# Patient Record
Sex: MALE | Race: WHITE | URBAN - NONMETROPOLITAN AREA
[De-identification: names, ages, dates, MRNs, and addresses within clinical notes are randomized per-mention and may not be internally consistent; named-entity substitution may affect disease eponyms.]

---

## 2017-10-20 LAB
CHOLESTEROL, TOTAL: 122 MG/DL
CHOLESTEROL/HDL RATIO: NORMAL
HDLC SERPL-MCNC: 64 MG/DL (ref 35–70)
LDL CHOLESTEROL CALCULATED: 51.4 MG/DL (ref 0–160)
PROSTATE SPECIFIC ANTIGEN: 0.05 NG/ML
TRIGL SERPL-MCNC: 33 MG/DL
VLDLC SERPL CALC-MCNC: NORMAL MG/DL

## 2018-01-30 ENCOUNTER — OFFICE VISIT (OUTPATIENT)
Dept: FAMILY MEDICINE CLINIC | Age: 73
End: 2018-01-30

## 2018-01-30 VITALS — BODY MASS INDEX: 36.08 KG/M2 | HEART RATE: 58 BPM | OXYGEN SATURATION: 98 % | WEIGHT: 243.6 LBS | HEIGHT: 69 IN

## 2018-01-30 DIAGNOSIS — Z23 NEED FOR VACCINATION FOR STREP PNEUMONIAE: ICD-10-CM

## 2018-01-30 DIAGNOSIS — Z91.81 AT HIGH RISK FOR FALLS: ICD-10-CM

## 2018-01-30 DIAGNOSIS — M51.36 DDD (DEGENERATIVE DISC DISEASE), LUMBAR: ICD-10-CM

## 2018-01-30 DIAGNOSIS — J44.9 CHRONIC OBSTRUCTIVE PULMONARY DISEASE, UNSPECIFIED COPD TYPE (HCC): ICD-10-CM

## 2018-01-30 DIAGNOSIS — M79.604 CHRONIC PAIN OF BOTH LOWER EXTREMITIES: Primary | ICD-10-CM

## 2018-01-30 DIAGNOSIS — G89.29 CHRONIC PAIN OF BOTH LOWER EXTREMITIES: Primary | ICD-10-CM

## 2018-01-30 DIAGNOSIS — C61 PROSTATE CANCER (HCC): ICD-10-CM

## 2018-01-30 DIAGNOSIS — M79.605 CHRONIC PAIN OF BOTH LOWER EXTREMITIES: Primary | ICD-10-CM

## 2018-01-30 LAB
A/G RATIO: 1.7 (ref 1.1–2.2)
ALBUMIN SERPL-MCNC: 4.4 G/DL (ref 3.4–5)
ALP BLD-CCNC: 97 U/L (ref 40–129)
ALT SERPL-CCNC: 21 U/L (ref 10–40)
ANION GAP SERPL CALCULATED.3IONS-SCNC: 14 MMOL/L (ref 3–16)
AST SERPL-CCNC: 21 U/L (ref 15–37)
BILIRUB SERPL-MCNC: <0.2 MG/DL (ref 0–1)
BUN BLDV-MCNC: 19 MG/DL (ref 7–20)
CALCIUM SERPL-MCNC: 9.4 MG/DL (ref 8.3–10.6)
CHLORIDE BLD-SCNC: 105 MMOL/L (ref 99–110)
CO2: 22 MMOL/L (ref 21–32)
CREAT SERPL-MCNC: 0.8 MG/DL (ref 0.8–1.3)
GFR AFRICAN AMERICAN: >60
GFR NON-AFRICAN AMERICAN: >60
GLOBULIN: 2.6 G/DL
GLUCOSE BLD-MCNC: 102 MG/DL (ref 70–99)
POTASSIUM SERPL-SCNC: 4.6 MMOL/L (ref 3.5–5.1)
SODIUM BLD-SCNC: 141 MMOL/L (ref 136–145)
TOTAL PROTEIN: 7 G/DL (ref 6.4–8.2)

## 2018-01-30 PROCEDURE — 1123F ACP DISCUSS/DSCN MKR DOCD: CPT | Performed by: NURSE PRACTITIONER

## 2018-01-30 PROCEDURE — G8484 FLU IMMUNIZE NO ADMIN: HCPCS | Performed by: NURSE PRACTITIONER

## 2018-01-30 PROCEDURE — G8427 DOCREV CUR MEDS BY ELIG CLIN: HCPCS | Performed by: NURSE PRACTITIONER

## 2018-01-30 PROCEDURE — 3017F COLORECTAL CA SCREEN DOC REV: CPT | Performed by: NURSE PRACTITIONER

## 2018-01-30 PROCEDURE — G8926 SPIRO NO PERF OR DOC: HCPCS | Performed by: NURSE PRACTITIONER

## 2018-01-30 PROCEDURE — G8417 CALC BMI ABV UP PARAM F/U: HCPCS | Performed by: NURSE PRACTITIONER

## 2018-01-30 PROCEDURE — G0009 ADMIN PNEUMOCOCCAL VACCINE: HCPCS | Performed by: NURSE PRACTITIONER

## 2018-01-30 PROCEDURE — 1036F TOBACCO NON-USER: CPT | Performed by: NURSE PRACTITIONER

## 2018-01-30 PROCEDURE — 3023F SPIROM DOC REV: CPT | Performed by: NURSE PRACTITIONER

## 2018-01-30 PROCEDURE — 90670 PCV13 VACCINE IM: CPT | Performed by: NURSE PRACTITIONER

## 2018-01-30 PROCEDURE — 36415 COLL VENOUS BLD VENIPUNCTURE: CPT | Performed by: NURSE PRACTITIONER

## 2018-01-30 PROCEDURE — 99202 OFFICE O/P NEW SF 15 MIN: CPT | Performed by: NURSE PRACTITIONER

## 2018-01-30 PROCEDURE — 4040F PNEUMOC VAC/ADMIN/RCVD: CPT | Performed by: NURSE PRACTITIONER

## 2018-01-30 RX ORDER — MELATONIN 10 MG
10 CAPSULE ORAL NIGHTLY
COMMUNITY

## 2018-01-30 RX ORDER — FLUTICASONE FUROATE AND VILANTEROL 200; 25 UG/1; UG/1
1 POWDER RESPIRATORY (INHALATION) DAILY
COMMUNITY
End: 2018-12-26 | Stop reason: SDUPTHER

## 2018-01-30 ASSESSMENT — ENCOUNTER SYMPTOMS
EYES NEGATIVE: 1
COUGH: 1
SHORTNESS OF BREATH: 1
GASTROINTESTINAL NEGATIVE: 1
BACK PAIN: 1

## 2018-01-30 ASSESSMENT — PATIENT HEALTH QUESTIONNAIRE - PHQ9
2. FEELING DOWN, DEPRESSED OR HOPELESS: 0
1. LITTLE INTEREST OR PLEASURE IN DOING THINGS: 0
SUM OF ALL RESPONSES TO PHQ QUESTIONS 1-9: 0
SUM OF ALL RESPONSES TO PHQ9 QUESTIONS 1 & 2: 0

## 2018-01-30 NOTE — PROGRESS NOTES
- US Duplex Venous Left Upper Lower Extremity; Future  - Ultrasound doppler arterial legs bilateral; Future  - Comprehensive Metabolic Panel    2. Chronic obstructive pulmonary disease, unspecified COPD type ([address]  Patient reports symptoms currently well controlled with current treatment. Patient reports he takes medication as ordered and paces activities. 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     3. DDD (degenerative disc disease), lumbar  Patient reports chronic back pain with known DDD. 4. Prostate cancer [facility])  Patient had prostatectomy and follows with oncology annually. Reviewed recent PSA which was wnl.    5. Need for vaccination for Strep pneumoniae  Discussed vaccine recommendations including prevnar 13 and Pneumococcal 23 after age 72 years. MA contacted previously provided and patient has had Pneumonia vaccine 23 approximately 12 year ago. Patient agreeable to have prevnar today. Advise on possible side effects/adverse reactions. Also see patient instructions. Recommend Pneumococcal 23 in 1 year. Patient verbalized understanding and agreeable to plan. - Pneumococcal conjugate vaccine 13-valent IM (PREVNAR 13)    6. At high risk for falls    On the basis of positive falls risk screening, assessment and plan is as follows: home safety tips provided.

## 2018-01-30 NOTE — PATIENT INSTRUCTIONS
Patient Education        Chronic Obstructive Pulmonary Disease (COPD): Care Instructions  Your Care Instructions    Chronic obstructive pulmonary disease (COPD) is a general term for a group of lung diseases, including emphysema and chronic bronchitis. People with COPD have decreased airflow in and out of the lungs, which makes it hard to breathe. The airways also can get clogged with thick mucus. Cigarette smoking is a major cause of COPD. Although there is no cure for COPD, you can slow its progress. Following your treatment plan and taking care of yourself can help you feel better and live longer. Follow-up care is a key part of your treatment and safety. Be sure to make and go to all appointments, and call your doctor if you are having problems. It's also a good idea to know your test results and keep a list of the medicines you take. How can you care for yourself at home? ?Staying healthy  ? · Do not smoke. This is the most important step you can take to prevent more damage to your lungs. If you need help quitting, talk to your doctor about stop-smoking programs and medicines. These can increase your chances of quitting for good. ? · Avoid colds and flu. Get a pneumococcal vaccine shot. If you have had one before, ask your doctor whether you need a second dose. Get the flu vaccine every fall. If you must be around people with colds or the flu, wash your hands often. ? · Avoid secondhand smoke, air pollution, and high altitudes. Also avoid cold, dry air and hot, humid air. Stay at home with your windows closed when air pollution is bad. ?Medicines and oxygen therapy  ? · Take your medicines exactly as prescribed. Call your doctor if you think you are having a problem with your medicine. ? · You may be taking medicines such as:  ¨ Bronchodilators. These help open your airways and make breathing easier.  Bronchodilators are either short-acting (work for 6 to 9 hours) or long-acting (work for 25 hours). You inhale most bronchodilators, so they start to act quickly. Always carry your quick-relief inhaler with you in case you need it while you are away from home. ¨ Corticosteroids (prednisone, budesonide). These reduce airway inflammation. They come in pill or inhaled form. You must take these medicines every day for them to work well. ? · A spacer may help you get more inhaled medicine to your lungs. Ask your doctor or pharmacist if a spacer is right for you. If it is, ask how to use it properly. ? · Do not take any vitamins, over-the-counter medicine, or herbal products without talking to your doctor first.   ? · If your doctor prescribed antibiotics, take them as directed. Do not stop taking them just because you feel better. You need to take the full course of antibiotics. ? · Oxygen therapy boosts the amount of oxygen in your blood and helps you breathe easier. Use the flow rate your doctor has recommended, and do not change it without talking to your doctor first.   Activity  ? · Get regular exercise. Walking is an easy way to get exercise. Start out slowly, and walk a little more each day. ? · Pay attention to your breathing. You are exercising too hard if you cannot talk while you are exercising. ? · Take short rest breaks when doing household chores and other activities. ? · Learn breathing methods-such as breathing through pursed lips-to help you become less short of breath. ? · If your doctor has not set you up with a pulmonary rehabilitation program, talk to him or her about whether rehab is right for you. Rehab includes exercise programs, education about your disease and how to manage it, help with diet and other changes, and emotional support. Diet  ? · Eat regular, healthy meals. Use bronchodilators about 1 hour before you eat to make it easier to eat. Eat several small meals instead of three large ones. Drink beverages at the end of the meal. Avoid foods that are hard to chew. bacteria that can spread from person to person through close contact. It can cause ear infections, and it can also lead to more serious infections of the:  · Lungs (pneumonia). · Blood (bacteremia). · Covering of the brain and spinal cord (meningitis). Pneumococcal pneumonia is most common among adults. Pneumococcal meningitis can cause deafness and brain damage, and it kills about 1 child in 10 who get it. Anyone can get pneumococcal disease, but children under 3years of age and adults 72 years and older, people with certain medical conditions, and cigarette smokers are at the highest risk. Before there was a vaccine, the [facility] saw the following in children under 5 each year from pneumococcal disease:  · More than 700 cases of meningitis  · About 13,000 blood infections  · About 5 million ear infections  · About 200 deaths  Since the vaccine became available, severe pneumococcal disease in these children has fallen by 88%. About 18,000 older adults die of pneumococcal disease each year in the [place]. Treatment of pneumococcal infections with penicillin and other drugs is not as effective as it used to be, because some strains of the disease have become resistant to these drugs. This makes prevention of the disease through vaccination even more important. PCV13 vaccine  Pneumococcal conjugate vaccine (called PCV13) protects against 13 types of pneumococcal bacteria. PCV13 is routinely given to children at 2, 4, 6, and 1515 months of age. It is also recommended for children and adults 3to 59years of age with certain health conditions, and for all adults 72years of age and older. Your doctor can give you details. Some people should not get this vaccine  Anyone who has ever had a life-threatening allergic reaction to a dose of this vaccine, to an earlier pneumococcal vaccine called PCV7, or to any vaccine containing diphtheria toxoid (for example, DTaP), should not get PCV13.   Anyone with each year or any time you notice a change. If you have trouble seeing and hearing, you might not be able to avoid objects and could lose your balance. · Know the side effects of the medicines you take. Ask your doctor or pharmacist whether the medicines you take can affect your balance. Sleeping pills or sedatives can affect your balance. · Limit the amount of alcohol you drink. Alcohol can impair your balance and other senses. · Ask your doctor whether calluses or corns on your feet need to be removed. If you wear loose-fitting shoes because of calluses or corns, you can lose your balance and fall. · Talk to your doctor if you have numbness in your feet. Preventing falls at home  · Remove raised doorway thresholds, throw rugs, and clutter. Repair loose carpet or raised areas in the floor. · Move furniture and electrical cords to keep them out of walking paths. · Use nonskid floor wax, and wipe up spills right away, especially on ceramic tile floors. · If you use a walker or cane, put rubber tips on it. If you use crutches, clean the bottoms of them regularly with an abrasive pad, such as steel wool. · Keep your house well lit, especially Kennis Lacy, and outside walkways. Use night-lights in areas such as hallways and bathrooms. Add extra light switches or use remote switches (such as switches that go on or off when you clap your hands) to make it easier to turn lights on if you have to get up during the night. · Install sturdy handrails on stairways. · Move items in your cabinets so that the things you use a lot are on the lower shelves (about waist level). · Keep a cordless phone and a flashlight with new batteries by your bed. If possible, put a phone in each of the main rooms of your house, or carry a cell phone in case you fall and cannot reach a phone. Or, you can wear a device around your neck or wrist. You push a button that sends a signal for help.   · Wear low-heeled shoes that fit well

## 2018-01-31 ENCOUNTER — TELEPHONE (OUTPATIENT)
Dept: FAMILY MEDICINE CLINIC | Age: 73
End: 2018-01-31

## 2018-01-31 DIAGNOSIS — M79.661 PAIN IN BOTH LOWER LEGS: ICD-10-CM

## 2018-01-31 DIAGNOSIS — M79.662 PAIN IN BOTH LOWER LEGS: ICD-10-CM

## 2018-01-31 DIAGNOSIS — I73.9 CLAUDICATION (HCC): Primary | ICD-10-CM

## 2018-02-01 RX ORDER — OMEPRAZOLE 20 MG/1
40 CAPSULE, DELAYED RELEASE ORAL DAILY
Qty: 90 CAPSULE | Refills: 2 | Status: SHIPPED | OUTPATIENT
Start: 2018-02-01 | End: 2018-11-15 | Stop reason: SDUPTHER

## 2018-02-01 RX ORDER — ATORVASTATIN CALCIUM 20 MG/1
20 TABLET, FILM COATED ORAL DAILY
Qty: 90 TABLET | Refills: 2 | Status: SHIPPED | OUTPATIENT
Start: 2018-02-01 | End: 2019-01-14 | Stop reason: SDUPTHER

## 2018-02-06 DIAGNOSIS — Z12.5 PROSTATE CANCER SCREENING: ICD-10-CM

## 2018-02-06 DIAGNOSIS — Z13.220 LIPID SCREENING: Primary | ICD-10-CM

## 2018-02-06 DIAGNOSIS — C61 PROSTATE CANCER (HCC): ICD-10-CM

## 2018-04-16 ENCOUNTER — OFFICE VISIT (OUTPATIENT)
Dept: FAMILY MEDICINE CLINIC | Age: 73
End: 2018-04-16

## 2018-04-16 VITALS
WEIGHT: 252 LBS | DIASTOLIC BLOOD PRESSURE: 74 MMHG | HEIGHT: 69 IN | HEART RATE: 56 BPM | OXYGEN SATURATION: 98 % | BODY MASS INDEX: 37.33 KG/M2 | SYSTOLIC BLOOD PRESSURE: 138 MMHG

## 2018-04-16 DIAGNOSIS — T14.8XXA ABRASION: Primary | ICD-10-CM

## 2018-04-16 DIAGNOSIS — Z23 NEED FOR SHINGLES VACCINE: ICD-10-CM

## 2018-04-16 PROCEDURE — 4040F PNEUMOC VAC/ADMIN/RCVD: CPT | Performed by: NURSE PRACTITIONER

## 2018-04-16 PROCEDURE — 99213 OFFICE O/P EST LOW 20 MIN: CPT | Performed by: NURSE PRACTITIONER

## 2018-04-16 PROCEDURE — G8427 DOCREV CUR MEDS BY ELIG CLIN: HCPCS | Performed by: NURSE PRACTITIONER

## 2018-04-16 PROCEDURE — 1036F TOBACCO NON-USER: CPT | Performed by: NURSE PRACTITIONER

## 2018-04-16 PROCEDURE — 1123F ACP DISCUSS/DSCN MKR DOCD: CPT | Performed by: NURSE PRACTITIONER

## 2018-04-16 PROCEDURE — G8417 CALC BMI ABV UP PARAM F/U: HCPCS | Performed by: NURSE PRACTITIONER

## 2018-04-16 PROCEDURE — 3017F COLORECTAL CA SCREEN DOC REV: CPT | Performed by: NURSE PRACTITIONER

## 2018-04-16 ASSESSMENT — ENCOUNTER SYMPTOMS
RESPIRATORY NEGATIVE: 1
EYES NEGATIVE: 1
ALLERGIC/IMMUNOLOGIC NEGATIVE: 1

## 2018-05-22 ENCOUNTER — OFFICE VISIT (OUTPATIENT)
Dept: FAMILY MEDICINE CLINIC | Age: 73
End: 2018-05-22

## 2018-05-22 VITALS
SYSTOLIC BLOOD PRESSURE: 118 MMHG | HEIGHT: 69 IN | OXYGEN SATURATION: 97 % | BODY MASS INDEX: 36.43 KG/M2 | WEIGHT: 246 LBS | DIASTOLIC BLOOD PRESSURE: 62 MMHG | HEART RATE: 63 BPM

## 2018-05-22 DIAGNOSIS — I25.10 CORONARY ARTERY DISEASE INVOLVING NATIVE CORONARY ARTERY OF NATIVE HEART WITHOUT ANGINA PECTORIS: ICD-10-CM

## 2018-05-22 DIAGNOSIS — E78.2 MIXED HYPERLIPIDEMIA: ICD-10-CM

## 2018-05-22 DIAGNOSIS — I10 ESSENTIAL HYPERTENSION: Primary | ICD-10-CM

## 2018-05-22 DIAGNOSIS — J44.9 CHRONIC OBSTRUCTIVE PULMONARY DISEASE, UNSPECIFIED COPD TYPE (HCC): ICD-10-CM

## 2018-05-22 DIAGNOSIS — Z11.59 ENCOUNTER FOR HEPATITIS C SCREENING TEST FOR LOW RISK PATIENT: ICD-10-CM

## 2018-05-22 DIAGNOSIS — C61 PROSTATE CANCER (HCC): ICD-10-CM

## 2018-05-22 DIAGNOSIS — M51.36 DDD (DEGENERATIVE DISC DISEASE), LUMBAR: ICD-10-CM

## 2018-05-22 DIAGNOSIS — Z13.6 SCREENING FOR AAA (AORTIC ABDOMINAL ANEURYSM): ICD-10-CM

## 2018-05-22 LAB
A/G RATIO: 1.9 (ref 1.1–2.2)
ALBUMIN SERPL-MCNC: 4.4 G/DL (ref 3.4–5)
ALP BLD-CCNC: 105 U/L (ref 40–129)
ALT SERPL-CCNC: 26 U/L (ref 10–40)
ANION GAP SERPL CALCULATED.3IONS-SCNC: 15 MMOL/L (ref 3–16)
AST SERPL-CCNC: 27 U/L (ref 15–37)
BILIRUB SERPL-MCNC: 0.4 MG/DL (ref 0–1)
BUN BLDV-MCNC: 21 MG/DL (ref 7–20)
CALCIUM SERPL-MCNC: 9.1 MG/DL (ref 8.3–10.6)
CHLORIDE BLD-SCNC: 102 MMOL/L (ref 99–110)
CO2: 22 MMOL/L (ref 21–32)
CREAT SERPL-MCNC: 0.7 MG/DL (ref 0.8–1.3)
GFR AFRICAN AMERICAN: >60
GFR NON-AFRICAN AMERICAN: >60
GLOBULIN: 2.3 G/DL
GLUCOSE BLD-MCNC: 99 MG/DL (ref 70–99)
HEPATITIS C ANTIBODY INTERPRETATION: NORMAL
POTASSIUM SERPL-SCNC: 4.6 MMOL/L (ref 3.5–5.1)
SODIUM BLD-SCNC: 139 MMOL/L (ref 136–145)
TOTAL PROTEIN: 6.7 G/DL (ref 6.4–8.2)

## 2018-05-22 PROCEDURE — 3017F COLORECTAL CA SCREEN DOC REV: CPT | Performed by: NURSE PRACTITIONER

## 2018-05-22 PROCEDURE — G8427 DOCREV CUR MEDS BY ELIG CLIN: HCPCS | Performed by: NURSE PRACTITIONER

## 2018-05-22 PROCEDURE — 1123F ACP DISCUSS/DSCN MKR DOCD: CPT | Performed by: NURSE PRACTITIONER

## 2018-05-22 PROCEDURE — 3023F SPIROM DOC REV: CPT | Performed by: NURSE PRACTITIONER

## 2018-05-22 PROCEDURE — G8598 ASA/ANTIPLAT THER USED: HCPCS | Performed by: NURSE PRACTITIONER

## 2018-05-22 PROCEDURE — G8417 CALC BMI ABV UP PARAM F/U: HCPCS | Performed by: NURSE PRACTITIONER

## 2018-05-22 PROCEDURE — G8926 SPIRO NO PERF OR DOC: HCPCS | Performed by: NURSE PRACTITIONER

## 2018-05-22 PROCEDURE — 1036F TOBACCO NON-USER: CPT | Performed by: NURSE PRACTITIONER

## 2018-05-22 PROCEDURE — 4040F PNEUMOC VAC/ADMIN/RCVD: CPT | Performed by: NURSE PRACTITIONER

## 2018-05-22 PROCEDURE — 36415 COLL VENOUS BLD VENIPUNCTURE: CPT | Performed by: NURSE PRACTITIONER

## 2018-05-22 PROCEDURE — 99213 OFFICE O/P EST LOW 20 MIN: CPT | Performed by: NURSE PRACTITIONER

## 2018-05-22 ASSESSMENT — ENCOUNTER SYMPTOMS
GASTROINTESTINAL NEGATIVE: 1
RESPIRATORY NEGATIVE: 1
EYES NEGATIVE: 1

## 2018-11-07 ENCOUNTER — TELEPHONE (OUTPATIENT)
Dept: FAMILY MEDICINE CLINIC | Age: 73
End: 2018-11-07

## 2018-11-07 DIAGNOSIS — E78.00 HYPERCHOLESTEROLEMIA: ICD-10-CM

## 2018-11-07 DIAGNOSIS — J44.9 CHRONIC OBSTRUCTIVE PULMONARY DISEASE, UNSPECIFIED COPD TYPE (HCC): ICD-10-CM

## 2018-11-07 RX ORDER — UBIDECARENONE 75 MG
200 CAPSULE ORAL DAILY
COMMUNITY

## 2018-11-07 RX ORDER — ECHINACEA 400 MG
1000 CAPSULE ORAL DAILY
COMMUNITY
End: 2021-02-22 | Stop reason: ALTCHOICE

## 2018-11-07 NOTE — TELEPHONE ENCOUNTER
Dr. Itzel Rosas 11/21:    Notes: due for lipid & CBC. Ask that I either mail the lab orders to him (time permitted) or fax them to [facility]. He'll get them drawn w/ his specialists labs on 11/14 or 11/15. This patient has an upcoming appointment with you for Chronic Obstructive Pulmonary Disease (COPD(, Hyperlipidemia and Hypertension. In planning for that visit I have completed the following pre-visit planning:     Pre-Visit Planning Checklist:  Patient contacted: yes  Verified patient by name and date of birth: yes    Health Maintenance items reviewed:    No pre-visit planning health maintenance topics to review at this time    Labs and procedures pended: Fasting Lipid Panel  and CBC   Labs and procedures discussed with patient: yes  Reminded patient to check with their insurance company about coverage for lab tests and lab location: yes    Preliminary Medication Reconciliation: was performed. Reminded patient to arrive early: yes    Please complete the med-reconciliation and sign the appropriate labs as soon as possible.       Cassandra Vicente MA  Patient Services Specialist  [phone]

## 2018-11-15 RX ORDER — OMEPRAZOLE 20 MG/1
40 CAPSULE, DELAYED RELEASE ORAL DAILY
Qty: 180 CAPSULE | Refills: 4 | Status: SHIPPED | OUTPATIENT
Start: 2018-11-15 | End: 2019-11-07

## 2018-11-19 LAB
A/G RATIO: 1.1 (ref 1–2.5)
ALBUMIN SERPL-MCNC: 3.7 G/DL (ref 3.6–5)
ALP BLD-CCNC: 106 U/L (ref 46–116)
ALT SERPL-CCNC: 34 U/L (ref 12–78)
ANION GAP SERPL CALCULATED.3IONS-SCNC: 12.7 MMOL/L (ref 8–16)
AST SERPL-CCNC: 25 U/L (ref 12–36)
BILIRUB SERPL-MCNC: 0.4 MG/DL (ref 0–1.1)
BUN BLDV-MCNC: 19 MG/DL (ref 5–19)
CALCIUM SERPL-MCNC: 8.7 MG/DL (ref 8.4–10.2)
CHLORIDE BLD-SCNC: 107 MMOL/L (ref 99–111)
CHOLESTEROL, TOTAL: 122 MG/DL (ref 0–200)
CO2: 28 MMOL/L (ref 21–33)
CREAT SERPL-MCNC: 1.1 MG/DL (ref 0.6–1.3)
ERYTHROCYTE DISTRIBUTION WIDTH RBC RATIO: 12.3 % (ref 11.6–14.8)
GFR CALCULATED: 66
GLOBULIN: 3.4 G/DL (ref 2–3.5)
GLUCOSE BLD-MCNC: 107 MG/DL (ref 74–99)
HCT VFR BLD CALC: 37.4 % (ref 37.4–53.8)
HDLC SERPL-MCNC: 54 MG/DL (ref 18–88)
HEMOGLOBIN: 12.9 G/DL (ref 13.2–16.5)
LDL CHOLESTEROL DIRECT: 61.8 MG/DL
LDL/HDL RATIO: 1.1
MCH RBC QN AUTO: 33 PG (ref 27.7–33.3)
MCHC RBC AUTO-ENTMCNC: 34.5 G/DL (ref 32.8–35)
MCV RBC AUTO: 95.7 FL (ref 80.5–96.9)
PLATELETS: 184 X1000 (ref 129–332)
POTASSIUM SERPL-SCNC: 4.4 MMOL/L (ref 3.4–5)
PROSTATE SPECIFIC ANTIGEN: <0.05 NG/ML (ref 0–4)
RBC # BLD: 3.91 X1000000 (ref 4.16–5.62)
SODIUM BLD-SCNC: 143 MMOL/L (ref 136–146)
TOTAL PROTEIN: 7.1 G/DL (ref 6.3–8)
TRIGL SERPL-MCNC: 31 MG/DL (ref 33–163)
VLDLC SERPL CALC-MCNC: 6.2 MG/DL (ref 10–50)
WBC # BLD: 5.1 X1000 (ref 5.4–9.9)

## 2018-11-20 ENCOUNTER — OFFICE VISIT (OUTPATIENT)
Dept: FAMILY MEDICINE CLINIC | Age: 73
End: 2018-11-20
Payer: MEDICARE

## 2018-11-20 VITALS
WEIGHT: 227.6 LBS | DIASTOLIC BLOOD PRESSURE: 76 MMHG | HEIGHT: 69 IN | SYSTOLIC BLOOD PRESSURE: 122 MMHG | OXYGEN SATURATION: 97 % | HEART RATE: 64 BPM | TEMPERATURE: 98.7 F | BODY MASS INDEX: 33.71 KG/M2

## 2018-11-20 DIAGNOSIS — E78.2 MIXED HYPERLIPIDEMIA: ICD-10-CM

## 2018-11-20 DIAGNOSIS — I10 ESSENTIAL HYPERTENSION: ICD-10-CM

## 2018-11-20 DIAGNOSIS — D64.9 ANEMIA, UNSPECIFIED TYPE: ICD-10-CM

## 2018-11-20 DIAGNOSIS — J44.9 CHRONIC OBSTRUCTIVE PULMONARY DISEASE, UNSPECIFIED COPD TYPE (HCC): ICD-10-CM

## 2018-11-20 DIAGNOSIS — Z85.46 HISTORY OF PROSTATE CANCER: ICD-10-CM

## 2018-11-20 DIAGNOSIS — J44.1 COPD EXACERBATION (HCC): Primary | ICD-10-CM

## 2018-11-20 PROCEDURE — G8484 FLU IMMUNIZE NO ADMIN: HCPCS | Performed by: FAMILY MEDICINE

## 2018-11-20 PROCEDURE — G8926 SPIRO NO PERF OR DOC: HCPCS | Performed by: FAMILY MEDICINE

## 2018-11-20 PROCEDURE — 4040F PNEUMOC VAC/ADMIN/RCVD: CPT | Performed by: FAMILY MEDICINE

## 2018-11-20 PROCEDURE — 3017F COLORECTAL CA SCREEN DOC REV: CPT | Performed by: FAMILY MEDICINE

## 2018-11-20 PROCEDURE — 1101F PT FALLS ASSESS-DOCD LE1/YR: CPT | Performed by: FAMILY MEDICINE

## 2018-11-20 PROCEDURE — 1036F TOBACCO NON-USER: CPT | Performed by: FAMILY MEDICINE

## 2018-11-20 PROCEDURE — 99214 OFFICE O/P EST MOD 30 MIN: CPT | Performed by: FAMILY MEDICINE

## 2018-11-20 PROCEDURE — G8598 ASA/ANTIPLAT THER USED: HCPCS | Performed by: FAMILY MEDICINE

## 2018-11-20 PROCEDURE — G8427 DOCREV CUR MEDS BY ELIG CLIN: HCPCS | Performed by: FAMILY MEDICINE

## 2018-11-20 PROCEDURE — 1123F ACP DISCUSS/DSCN MKR DOCD: CPT | Performed by: FAMILY MEDICINE

## 2018-11-20 PROCEDURE — G8417 CALC BMI ABV UP PARAM F/U: HCPCS | Performed by: FAMILY MEDICINE

## 2018-11-20 PROCEDURE — 3023F SPIROM DOC REV: CPT | Performed by: FAMILY MEDICINE

## 2018-11-20 RX ORDER — ALBUTEROL SULFATE 90 UG/1
2 AEROSOL, METERED RESPIRATORY (INHALATION) EVERY 6 HOURS PRN
Qty: 1 INHALER | Refills: 3 | Status: SHIPPED | OUTPATIENT
Start: 2018-11-20 | End: 2021-08-18 | Stop reason: SDUPTHER

## 2018-11-20 RX ORDER — PREDNISONE 20 MG/1
40 TABLET ORAL DAILY
Qty: 10 TABLET | Refills: 0 | Status: SHIPPED | OUTPATIENT
Start: 2018-11-20 | End: 2018-11-25

## 2018-11-20 RX ORDER — DOXYCYCLINE HYCLATE 100 MG
100 TABLET ORAL 2 TIMES DAILY
Qty: 20 TABLET | Refills: 0 | Status: SHIPPED | OUTPATIENT
Start: 2018-11-20 | End: 2018-11-30

## 2018-11-20 ASSESSMENT — ENCOUNTER SYMPTOMS
COUGH: 1
SORE THROAT: 1
DIARRHEA: 0
SHORTNESS OF BREATH: 1
CHEST TIGHTNESS: 0
NAUSEA: 0
BLOOD IN STOOL: 0
CONSTIPATION: 0

## 2018-11-20 NOTE — PROGRESS NOTES
Physical Exam   Constitutional: He is oriented to person, place, and time. Vital signs are normal. He appears well-developed and well-nourished. He is cooperative. He does not have a sickly appearance. HENT:   Head: Normocephalic and atraumatic. Right Ear: Decreased hearing is noted. Left Ear: Decreased hearing is noted. Mouth/Throat: Uvula is midline and mucous membranes are normal.   bilat hearing aids   Eyes: Conjunctivae and EOM are normal.   Neck: Neck supple. Cardiovascular: Normal rate, regular rhythm, S1 normal, S2 normal, normal heart sounds and normal pulses. No murmur heard. Pulmonary/Chest: Effort normal. No accessory muscle usage. No respiratory distress. He has decreased breath sounds. Neurological: He is alert and oriented to person, place, and time. Skin: Skin is warm and dry. No rash noted. Psychiatric: He has a normal mood and affect. His speech is normal and behavior is normal.         ASSESSMENT/PLAN:    1. COPD exacerbation (HCC)  Likely mild COPD exacerbation. Given his long history, antibiotics and steroid as below. Patient denies need for cough medication. Add albuterol on an as-needed basis. Otherwise continue medications. I do not see a need for chest x-ray today, given normal oxygen saturations. - albuterol sulfate HFA (PROVENTIL HFA) 108 (90 Base) MCG/ACT inhaler; Inhale 2 puffs into the lungs every 6 hours as needed for Wheezing  Dispense: 1 Inhaler; Refill: 3  - doxycycline hyclate (VIBRA-TABS) 100 MG tablet; Take 1 tablet by mouth 2 times daily for 10 days  Dispense: 20 tablet; Refill: 0  - predniSONE (DELTASONE) 20 MG tablet; Take 2 tablets by mouth daily for 5 days  Dispense: 10 tablet; Refill: 0    2. Chronic obstructive pulmonary disease, unspecified COPD type ([facility] [address]  See above. Continue Breo. 3. Essential hypertension  The current medical regimen is effective;  continue present plan and medications. 4. Mixed hyperlipidemia  Reviewed labs. The

## 2018-11-26 ENCOUNTER — TELEPHONE (OUTPATIENT)
Dept: FAMILY MEDICINE CLINIC | Age: 73
End: 2018-11-26

## 2018-11-26 DIAGNOSIS — R05.9 COUGH: Primary | ICD-10-CM

## 2018-11-26 RX ORDER — CEFDINIR 300 MG/1
300 CAPSULE ORAL 2 TIMES DAILY
Qty: 20 CAPSULE | Refills: 0 | Status: SHIPPED | OUTPATIENT
Start: 2018-11-26 | End: 2019-12-13 | Stop reason: SDUPTHER

## 2018-11-26 RX ORDER — BENZONATATE 200 MG/1
200 CAPSULE ORAL EVERY 8 HOURS PRN
Qty: 30 CAPSULE | Refills: 0 | Status: SHIPPED | OUTPATIENT
Start: 2018-11-26 | End: 2018-12-06

## 2018-11-26 RX ORDER — AZITHROMYCIN 250 MG/1
250 TABLET, FILM COATED ORAL DAILY
Qty: 10 TABLET | Refills: 0 | Status: SHIPPED | OUTPATIENT
Start: 2018-11-26 | End: 2018-12-06

## 2018-11-26 NOTE — PROGRESS NOTES
No evidence of pneumonia on cxr. Has been on doxy. Stop doxy. omnicef 300mg po bid x 10 days, #20, no rf and zpack. rec otc florastor or culturelle to help dec risk of SE related to abx.   If worsening, rec either f/u appt or ER if sig worse

## 2018-12-13 ENCOUNTER — NURSE ONLY (OUTPATIENT)
Dept: FAMILY MEDICINE CLINIC | Age: 73
End: 2018-12-13
Payer: MEDICARE

## 2018-12-13 ENCOUNTER — TELEPHONE (OUTPATIENT)
Dept: FAMILY MEDICINE CLINIC | Age: 73
End: 2018-12-13

## 2018-12-13 DIAGNOSIS — Z23 NEED FOR IMMUNIZATION AGAINST INFLUENZA: Primary | ICD-10-CM

## 2018-12-13 DIAGNOSIS — D64.9 ANEMIA, UNSPECIFIED TYPE: ICD-10-CM

## 2018-12-13 LAB
CONTROL: PRESENT
HEMOCCULT STL QL: NEGATIVE

## 2018-12-13 PROCEDURE — 90662 IIV NO PRSV INCREASED AG IM: CPT | Performed by: FAMILY MEDICINE

## 2018-12-13 PROCEDURE — G0008 ADMIN INFLUENZA VIRUS VAC: HCPCS | Performed by: FAMILY MEDICINE

## 2018-12-13 PROCEDURE — 82274 ASSAY TEST FOR BLOOD FECAL: CPT | Performed by: FAMILY MEDICINE

## 2018-12-13 NOTE — TELEPHONE ENCOUNTER
Pt is requesting an excuse for [employer] due to his Paget's disease, is this okay to give?

## 2019-01-02 RX ORDER — FLUTICASONE FUROATE AND VILANTEROL 200; 25 UG/1; UG/1
1 POWDER RESPIRATORY (INHALATION) DAILY
Qty: 3 EACH | Refills: 3 | Status: SHIPPED | OUTPATIENT
Start: 2019-01-02 | End: 2019-12-30 | Stop reason: SDUPTHER

## 2019-01-14 RX ORDER — ATORVASTATIN CALCIUM 20 MG/1
20 TABLET, FILM COATED ORAL DAILY
Qty: 90 TABLET | Refills: 3 | Status: SHIPPED | OUTPATIENT
Start: 2019-01-14 | End: 2020-01-30 | Stop reason: SDUPTHER

## 2019-03-28 ENCOUNTER — OFFICE VISIT (OUTPATIENT)
Dept: FAMILY MEDICINE CLINIC | Age: 74
End: 2019-03-28
Payer: MEDICARE

## 2019-03-28 VITALS
DIASTOLIC BLOOD PRESSURE: 78 MMHG | SYSTOLIC BLOOD PRESSURE: 110 MMHG | BODY MASS INDEX: 34.07 KG/M2 | WEIGHT: 230 LBS | HEART RATE: 60 BPM | TEMPERATURE: 98.7 F | OXYGEN SATURATION: 98 % | HEIGHT: 69 IN

## 2019-03-28 DIAGNOSIS — J06.9 UPPER RESPIRATORY TRACT INFECTION, UNSPECIFIED TYPE: Primary | ICD-10-CM

## 2019-03-28 DIAGNOSIS — J44.9 CHRONIC OBSTRUCTIVE PULMONARY DISEASE, UNSPECIFIED COPD TYPE (HCC): ICD-10-CM

## 2019-03-28 PROCEDURE — 3017F COLORECTAL CA SCREEN DOC REV: CPT | Performed by: FAMILY MEDICINE

## 2019-03-28 PROCEDURE — 99213 OFFICE O/P EST LOW 20 MIN: CPT | Performed by: FAMILY MEDICINE

## 2019-03-28 PROCEDURE — G8427 DOCREV CUR MEDS BY ELIG CLIN: HCPCS | Performed by: FAMILY MEDICINE

## 2019-03-28 PROCEDURE — 1123F ACP DISCUSS/DSCN MKR DOCD: CPT | Performed by: FAMILY MEDICINE

## 2019-03-28 PROCEDURE — 1036F TOBACCO NON-USER: CPT | Performed by: FAMILY MEDICINE

## 2019-03-28 PROCEDURE — 4040F PNEUMOC VAC/ADMIN/RCVD: CPT | Performed by: FAMILY MEDICINE

## 2019-03-28 PROCEDURE — G8926 SPIRO NO PERF OR DOC: HCPCS | Performed by: FAMILY MEDICINE

## 2019-03-28 PROCEDURE — G8482 FLU IMMUNIZE ORDER/ADMIN: HCPCS | Performed by: FAMILY MEDICINE

## 2019-03-28 PROCEDURE — G8417 CALC BMI ABV UP PARAM F/U: HCPCS | Performed by: FAMILY MEDICINE

## 2019-03-28 PROCEDURE — G8598 ASA/ANTIPLAT THER USED: HCPCS | Performed by: FAMILY MEDICINE

## 2019-03-28 PROCEDURE — 3023F SPIROM DOC REV: CPT | Performed by: FAMILY MEDICINE

## 2019-03-28 RX ORDER — BENZONATATE 200 MG/1
200 CAPSULE ORAL 3 TIMES DAILY PRN
Qty: 30 CAPSULE | Refills: 0 | Status: SHIPPED | OUTPATIENT
Start: 2019-03-28 | End: 2019-04-07

## 2019-03-28 RX ORDER — PREDNISONE 20 MG/1
40 TABLET ORAL DAILY
Qty: 10 TABLET | Refills: 0 | Status: SHIPPED | OUTPATIENT
Start: 2019-03-28 | End: 2019-04-02

## 2019-03-28 RX ORDER — FLUTICASONE PROPIONATE 50 MCG
2 SPRAY, SUSPENSION (ML) NASAL DAILY
Qty: 1 BOTTLE | Refills: 3 | Status: SHIPPED | OUTPATIENT
Start: 2019-03-28 | End: 2021-02-22

## 2019-03-28 ASSESSMENT — ENCOUNTER SYMPTOMS
VOMITING: 0
SHORTNESS OF BREATH: 0
RHINORRHEA: 1
SINUS PRESSURE: 0
SORE THROAT: 0
WHEEZING: 0
COUGH: 1
DIARRHEA: 0
NAUSEA: 0

## 2019-04-04 ENCOUNTER — OFFICE VISIT (OUTPATIENT)
Dept: FAMILY MEDICINE CLINIC | Age: 74
End: 2019-04-04
Payer: MEDICARE

## 2019-04-04 VITALS
SYSTOLIC BLOOD PRESSURE: 130 MMHG | HEART RATE: 57 BPM | BODY MASS INDEX: 34.24 KG/M2 | WEIGHT: 231.2 LBS | OXYGEN SATURATION: 98 % | DIASTOLIC BLOOD PRESSURE: 82 MMHG | HEIGHT: 69 IN

## 2019-04-04 DIAGNOSIS — Z01.818 PREOP EXAMINATION: Primary | ICD-10-CM

## 2019-04-04 DIAGNOSIS — J44.9 CHRONIC OBSTRUCTIVE PULMONARY DISEASE, UNSPECIFIED COPD TYPE (HCC): ICD-10-CM

## 2019-04-04 DIAGNOSIS — I25.10 CORONARY ARTERY DISEASE INVOLVING NATIVE CORONARY ARTERY OF NATIVE HEART WITHOUT ANGINA PECTORIS: ICD-10-CM

## 2019-04-04 DIAGNOSIS — H26.9 CATARACT OF BOTH EYES, UNSPECIFIED CATARACT TYPE: ICD-10-CM

## 2019-04-04 PROCEDURE — G8428 CUR MEDS NOT DOCUMENT: HCPCS | Performed by: FAMILY MEDICINE

## 2019-04-04 PROCEDURE — G8598 ASA/ANTIPLAT THER USED: HCPCS | Performed by: FAMILY MEDICINE

## 2019-04-04 PROCEDURE — 3023F SPIROM DOC REV: CPT | Performed by: FAMILY MEDICINE

## 2019-04-04 PROCEDURE — 3017F COLORECTAL CA SCREEN DOC REV: CPT | Performed by: FAMILY MEDICINE

## 2019-04-04 PROCEDURE — 4040F PNEUMOC VAC/ADMIN/RCVD: CPT | Performed by: FAMILY MEDICINE

## 2019-04-04 PROCEDURE — 1123F ACP DISCUSS/DSCN MKR DOCD: CPT | Performed by: FAMILY MEDICINE

## 2019-04-04 PROCEDURE — G8417 CALC BMI ABV UP PARAM F/U: HCPCS | Performed by: FAMILY MEDICINE

## 2019-04-04 PROCEDURE — G8926 SPIRO NO PERF OR DOC: HCPCS | Performed by: FAMILY MEDICINE

## 2019-04-04 PROCEDURE — 1036F TOBACCO NON-USER: CPT | Performed by: FAMILY MEDICINE

## 2019-04-04 PROCEDURE — 99213 OFFICE O/P EST LOW 20 MIN: CPT | Performed by: FAMILY MEDICINE

## 2019-04-04 ASSESSMENT — ENCOUNTER SYMPTOMS
CHEST TIGHTNESS: 0
DIARRHEA: 0
CONSTIPATION: 0
SHORTNESS OF BREATH: 0
BLOOD IN STOOL: 0

## 2019-04-04 ASSESSMENT — PATIENT HEALTH QUESTIONNAIRE - PHQ9
SUM OF ALL RESPONSES TO PHQ9 QUESTIONS 1 & 2: 0
1. LITTLE INTEREST OR PLEASURE IN DOING THINGS: 0
2. FEELING DOWN, DEPRESSED OR HOPELESS: 0
SUM OF ALL RESPONSES TO PHQ QUESTIONS 1-9: 0
SUM OF ALL RESPONSES TO PHQ QUESTIONS 1-9: 0

## 2019-04-04 NOTE — PROGRESS NOTES
Khushboo Nunez  YOB: 1945    Date of Service:  4/4/2019      Wt Readings from Last 2 Encounters:   04/04/19 231 lb 3.2 oz (104.9 kg)   03/28/19 230 lb (104.3 kg)       BP Readings from Last 3 Encounters:   04/04/19 130/82   03/28/19 110/78   11/20/18 122/76        Chief Complaint   Patient presents with   [address]/4-9-2019/Cataract Removal L eye       Allergies   Allergen Reactions    Pcn [Penicillins] Swelling    Wheat Extract Other (See Comments)     Celiac disease       No outpatient medications have been marked as taking for the 4/4/19 encounter (Office Visit) with Ryann Wiley MD.       This patient presents to the office today for a preoperative consultation at the request of surgeon, Dr. Camilo Foley, who plans on performing Left Cataract Removal on April 9 . The current problem began 4 years ago, and symptoms have been worsening with time. Conservative therapy: No. Plans on right cataract surgery approx 4/30.     Planned anesthesia: Local and IV sedation   Known anesthesia problems: None   Bleeding risk: No recent or remote history of abnormal bleeding  Personal or FH of DVT/PE: Yes - 1980's    Patient objection to receiving blood products: No    Past Medical History:   Diagnosis Date    Asthma     CAD (coronary artery disease)     Celiac sprue     COPD (chronic obstructive pulmonary disease) ([address]     DDD (degenerative disc disease), lumbar     Fatty liver     HTN (hypertension)     Hyperlipidemia     Paget's disease     Prostate cancer ([address] 07/2009       Past Surgical History:   Procedure Laterality Date    COLONOSCOPY  11/2017    PROSTATECTOMY      UPPER GASTROINTESTINAL ENDOSCOPY  11/19/2012       Family History   Problem Relation Age of Onset    Other Mother         macular degeneration, DVT     Colon Cancer Father     Other Brother         celiac    High Blood Pressure Brother     Diabetes Brother     Heart Attack Maternal Grandmother     [FreeTextEntry1] : see consult letter Heart Attack Maternal Grandfather     Stroke Maternal Grandfather     Heart Attack Paternal Grandfather     Other Paternal Uncle         stomach cancer       Social History     Socioeconomic History    Marital status:      Spouse name: Not on file    Number of children: Not on file    Years of education: Not on file    Highest education level: Not on file   Occupational History    Occupation: [employer] Financial resource strain: Not on file    Food insecurity:     Worry: Not on file     Inability: Not on file   [employer] needs:     Medical: Not on file     Non-medical: Not on file   Tobacco Use    Smoking status: Former Smoker     Packs/day: 0.10     Years: 4.00     Pack years: 0.40     Types: Cigars     Start date: 1964     Last attempt to quit: 1970     Years since quittin.2    Smokeless tobacco: Never Used   Substance and Sexual Activity    Alcohol use: No    Drug use: No    Sexual activity: Yes   Lifestyle    Physical activity:     Days per week: Not on file     Minutes per session: Not on file    Stress: Not on file   Relationships    Social connections:     Talks on phone: Not on file     Gets together: Not on file     Attends Gnosticist service: Not on file     Active member of club or organization: Not on file     Attends meetings of clubs or organizations: Not on file     Relationship status: Not on file    Intimate partner violence:     Fear of current or ex partner: Not on file     Emotionally abused: Not on file     Physically abused: Not on file     Forced sexual activity: Not on file   Other Topics Concern    Not on file   Social History Narrative    Not on file       Review of Systems  Review of Systems   Constitutional: Negative for chills, fatigue and fever. Respiratory: Negative for chest tightness and shortness of breath. Cardiovascular: Negative for chest pain and palpitations.    Gastrointestinal: Negative for blood in stool, surgery: Discontinue ASA 7 days before surgery  3. Prophylaxis forcardiac events with perioperative beta-blockers: Not indicated  ACC/AHA indications for pre-operative beta-blocker use:    · Vascular surgery with history of postitive stress test  · Intermediate or high risk surgery with history of CAD   · Intermediate or high risk surgery with multiple clinical predictors of CAD- 2 of the following: history of compensated or prior heart failure, history of cerebrovascular disease, DM, or renal insufficiency    Routine administration of higher-dose, long-acting metoprolol in beta-blocker-naïve patients on the day of surgery, and in the absence of dose titration is associated with an overall increase in mortality. Beta-blockers should be started days to weeks prior to surgery and titrated to pulse < 70.  4. Deep vein thrombosis prophylaxis: n/a  5. No contraindications to planned surgery  Had cardiac clearance. Reviewed in [address] [facility].  Andi Santos MD

## 2019-05-15 ENCOUNTER — OFFICE VISIT (OUTPATIENT)
Dept: FAMILY MEDICINE CLINIC | Age: 74
End: 2019-05-15
Payer: MEDICARE

## 2019-05-15 VITALS
OXYGEN SATURATION: 98 % | DIASTOLIC BLOOD PRESSURE: 70 MMHG | BODY MASS INDEX: 35.16 KG/M2 | HEART RATE: 57 BPM | SYSTOLIC BLOOD PRESSURE: 134 MMHG | HEIGHT: 69 IN | WEIGHT: 237.4 LBS

## 2019-05-15 DIAGNOSIS — D64.9 ANEMIA, UNSPECIFIED TYPE: ICD-10-CM

## 2019-05-15 DIAGNOSIS — J44.9 CHRONIC OBSTRUCTIVE PULMONARY DISEASE, UNSPECIFIED COPD TYPE (HCC): ICD-10-CM

## 2019-05-15 DIAGNOSIS — C61 PROSTATE CANCER (HCC): ICD-10-CM

## 2019-05-15 DIAGNOSIS — E78.2 MIXED HYPERLIPIDEMIA: ICD-10-CM

## 2019-05-15 DIAGNOSIS — Z23 NEED FOR PNEUMOCOCCAL VACCINATION: Primary | ICD-10-CM

## 2019-05-15 LAB
BASOPHILS ABSOLUTE: 0.1 K/UL (ref 0–0.2)
BASOPHILS RELATIVE PERCENT: 1.5 %
EOSINOPHILS ABSOLUTE: 0.1 K/UL (ref 0–0.6)
EOSINOPHILS RELATIVE PERCENT: 2.5 %
HCT VFR BLD CALC: 39.7 % (ref 40.5–52.5)
HEMOGLOBIN: 13.8 G/DL (ref 13.5–17.5)
LYMPHOCYTES ABSOLUTE: 1 K/UL (ref 1–5.1)
LYMPHOCYTES RELATIVE PERCENT: 24.1 %
MCH RBC QN AUTO: 34.4 PG (ref 26–34)
MCHC RBC AUTO-ENTMCNC: 34.8 G/DL (ref 31–36)
MCV RBC AUTO: 98.9 FL (ref 80–100)
MONOCYTES ABSOLUTE: 0.4 K/UL (ref 0–1.3)
MONOCYTES RELATIVE PERCENT: 10.2 %
NEUTROPHILS ABSOLUTE: 2.6 K/UL (ref 1.7–7.7)
NEUTROPHILS RELATIVE PERCENT: 61.7 %
PDW BLD-RTO: 13.6 % (ref 12.4–15.4)
PLATELET # BLD: 173 K/UL (ref 135–450)
PMV BLD AUTO: 10 FL (ref 5–10.5)
RBC # BLD: 4.02 M/UL (ref 4.2–5.9)
WBC # BLD: 4.3 K/UL (ref 4–11)

## 2019-05-15 PROCEDURE — 4040F PNEUMOC VAC/ADMIN/RCVD: CPT | Performed by: FAMILY MEDICINE

## 2019-05-15 PROCEDURE — 3017F COLORECTAL CA SCREEN DOC REV: CPT | Performed by: FAMILY MEDICINE

## 2019-05-15 PROCEDURE — G8417 CALC BMI ABV UP PARAM F/U: HCPCS | Performed by: FAMILY MEDICINE

## 2019-05-15 PROCEDURE — G8427 DOCREV CUR MEDS BY ELIG CLIN: HCPCS | Performed by: FAMILY MEDICINE

## 2019-05-15 PROCEDURE — 90732 PPSV23 VACC 2 YRS+ SUBQ/IM: CPT | Performed by: FAMILY MEDICINE

## 2019-05-15 PROCEDURE — G0009 ADMIN PNEUMOCOCCAL VACCINE: HCPCS | Performed by: FAMILY MEDICINE

## 2019-05-15 PROCEDURE — 1123F ACP DISCUSS/DSCN MKR DOCD: CPT | Performed by: FAMILY MEDICINE

## 2019-05-15 PROCEDURE — G8598 ASA/ANTIPLAT THER USED: HCPCS | Performed by: FAMILY MEDICINE

## 2019-05-15 PROCEDURE — 3023F SPIROM DOC REV: CPT | Performed by: FAMILY MEDICINE

## 2019-05-15 PROCEDURE — G8926 SPIRO NO PERF OR DOC: HCPCS | Performed by: FAMILY MEDICINE

## 2019-05-15 PROCEDURE — 36415 COLL VENOUS BLD VENIPUNCTURE: CPT | Performed by: FAMILY MEDICINE

## 2019-05-15 PROCEDURE — 99214 OFFICE O/P EST MOD 30 MIN: CPT | Performed by: FAMILY MEDICINE

## 2019-05-15 PROCEDURE — 1036F TOBACCO NON-USER: CPT | Performed by: FAMILY MEDICINE

## 2019-05-15 ASSESSMENT — ENCOUNTER SYMPTOMS
DIARRHEA: 0
CHEST TIGHTNESS: 0
BLURRED VISION: 0
BLOOD IN STOOL: 0
CONSTIPATION: 0
SHORTNESS OF BREATH: 0

## 2019-05-15 NOTE — PROGRESS NOTES
Chief Complaint   Patient presents with    Hypertension    Hyperlipidemia       HPI:  Howard Westbrook is a 69 y.o. (: 1945) here today   for   Hypertension   This is a chronic problem. The current episode started more than 1 year ago. The problem is unchanged. The problem is controlled. Pertinent negatives include no blurred vision, chest pain, headaches, malaise/fatigue, palpitations or shortness of breath. There are no associated agents to hypertension. Risk factors for coronary artery disease include dyslipidemia and male gender. Past treatments include nothing. There are no compliance problems. Hyperlipidemia   This is a chronic problem. The current episode started more than 1 year ago. The problem is controlled. Recent lipid tests were reviewed and are normal. He has no history of diabetes or hypothyroidism. There are no known factors aggravating his hyperlipidemia. Pertinent negatives include no chest pain, leg pain or shortness of breath. Current antihyperlipidemic treatment includes statins and fibric acid derivatives. The current treatment provides mild improvement of lipids. Risk factors for coronary artery disease include dyslipidemia, hypertension and male sex. Recent Cataract surgery, has f/u in 2 weeks to find out if he will need reading glasses. Copd has been well controlled, rarely using rescue inhaler. Intermittently has flare ups when he over exerts himself. Had prostate cancer follow up in February. Annual follow up. Patient's medications, allergies, past medical, surgical, social and family histories were reviewed and updated as appropriate. ROS:  Review of Systems   Constitutional: Negative for chills, fatigue, fever and malaise/fatigue. Eyes: Negative for blurred vision. Respiratory: Negative for chest tightness and shortness of breath. Cardiovascular: Negative for chest pain and palpitations.    Gastrointestinal: Negative for blood in stool, constipation and diarrhea. Neurological: Negative for dizziness, light-headedness and headaches.            LDL Calculated (mg/dL)   Date Value   10/20/2017 51.4       Past Medical History:   Diagnosis Date    Asthma     CAD (coronary artery disease)     Celiac sprue     COPD (chronic obstructive pulmonary disease) (HCC)     DDD (degenerative disc disease), lumbar     Fatty liver     HTN (hypertension)     Hyperlipidemia     Paget's disease     Prostate cancer ([facility] [address] 2009       Family History   Problem Relation Age of Onset    Other Mother         macular degeneration, DVT     Colon Cancer Father     Other Brother         celiac    High Blood Pressure Brother     Diabetes Brother     Heart Attack Maternal Grandmother     Heart Attack Maternal Grandfather     Stroke Maternal Grandfather     Heart Attack Paternal Grandfather     Other Paternal Uncle         stomach cancer       Social History     Socioeconomic History    Marital status:      Spouse name: Not on file    Number of children: Not on file    Years of education: Not on file    Highest education level: Not on file   Occupational History    Occupation: [email or web address] Financial resource strain: Not on file    Food insecurity:     Worry: Not on file     Inability: Not on file   [employer] needs:     Medical: Not on file     Non-medical: Not on file   Tobacco Use    Smoking status: Former Smoker     Packs/day: 0.10     Years: 4.00     Pack years: 0.40     Types: Cigars     Start date: 1964     Last attempt to quit: 1970     Years since quittin.4    Smokeless tobacco: Never Used   Substance and Sexual Activity    Alcohol use: No    Drug use: No    Sexual activity: Yes   Lifestyle    Physical activity:     Days per week: Not on file     Minutes per session: Not on file    Stress: Not on file   Relationships    Social connections:     Talks on phone: Not on file     Gets together: Not on file     Attends [employer] service: Not on file     Active member of club or organization: Not on file     Attends meetings of clubs or organizations: Not on file     Relationship status: Not on file    Intimate partner violence:     Fear of current or ex partner: Not on file     Emotionally abused: Not on file     Physically abused: Not on file     Forced sexual activity: Not on file   Other Topics Concern    Not on file   Social History Narrative    Not on file       Prior to Visit Medications    Medication Sig Taking? Authorizing Provider   fluticasone (FLONASE) 50 MCG/ACT nasal spray 2 sprays by Nasal route daily Yes Valente Castillo MD   atorvastatin (LIPITOR) 20 MG tablet Take 1 tablet by mouth daily Yes DONTA Lipscomb - CNP   Fluticasone Furoate-Vilanterol (BREO ELLIPTA) 200-25 MCG/INH AEPB Inhale 1 puff into the lungs daily Yes Valente Castillo MD   albuterol sulfate HFA (PROVENTIL HFA) 108 (90 Base) MCG/ACT inhaler Inhale 2 puffs into the lungs every 6 hours as needed for Wheezing Yes Valente Castillo MD   omeprazole (PRILOSEC) 20 MG delayed release capsule Take 2 capsules by mouth Daily Yes Valente Castillo MD   Flaxseed, Linseed, (FLAXSEED OIL) 1000 MG CAPS Take 1,000 mg by mouth daily Yes Historical Provider, MD   Coenzyme Q10 (CO Q-10) 200 MG CAPS Take 200 mg by mouth daily Yes Historical Provider, MD   Diphenhydramine-APAP, sleep, (TYLENOL PM EXTRA STRENGTH PO) Take by mouth as needed Yes Historical Provider, MD   Melatonin 10 MG CAPS Take 10 mg by mouth nightly Yes Historical Provider, MD   Glucosamine-Chondroitin-Ca-D3 (TRIPLE FLEX 50+ PO) Take 2 tablets by mouth Daily. Yes Historical Provider, MD   Calcium-Magnesium-Vitamin D (CALCIUM MAGNESIUM PO) Take 1 tablet by mouth Daily. Dose is 1200/400  Yes Historical Provider, MD   acetaminophen (TYLENOL) 325 MG tablet Take 1,000 mg by mouth as needed. Yes Historical Provider, MD   Casanthranol-Docusate Sodium  MG CAPS Take 1 capsule by mouth as needed.  LOC prn  Yes Historical Provider, MD   therapeutic multivitamin-minerals (THERAGRAN-M) tablet Take 1 tablet by mouth daily. Yes Historical Provider, MD   aspirin 81 MG chewable tablet Take 81 mg by mouth once a week  Yes Historical Provider, MD       Allergies   Allergen Reactions    Pcn [Penicillins] Swelling    Wheat Extract Other (See Comments)     Celiac disease       OBJECTIVE:    /70   Pulse 57   Ht 5' 8.5\" (1.74 m)   Wt 237 lb 6.4 oz (107.7 kg)   SpO2 98%   BMI 35.57 kg/m²     BP Readings from Last 2 Encounters:   05/15/19 134/70   04/04/19 130/82       Wt Readings from Last 3 Encounters:   05/15/19 237 lb 6.4 oz (107.7 kg)   04/04/19 231 lb 3.2 oz (104.9 kg)   03/28/19 230 lb (104.3 kg)       Physical Exam   Constitutional: He is oriented to person, place, and time. He appears well-developed and well-nourished. HENT:   Head: Normocephalic and atraumatic. Eyes: Conjunctivae and EOM are normal.   Neck: No tracheal deviation present. Cardiovascular: Normal rate and regular rhythm. Exam reveals no gallop and no friction rub. No murmur heard. Pulmonary/Chest: Effort normal and breath sounds normal.   Abdominal: Soft. There is no tenderness. Musculoskeletal: He exhibits no edema. Neurological: He is alert and oriented to person, place, and time. Skin: Skin is warm and dry. Psychiatric: He has a normal mood and affect. ASSESSMENT/PLAN:    1. Need for pneumococcal vaccination  Vaccine today  - PNEUMOVAX 23 subcutaneous/IM (Pneumococcal polysaccharide vaccine 23-valent >= 3yo)    2. Essential hypertension  bp controlled w/o meds    3. Mixed hyperlipidemia  The current medical regimen is effective;  continue present plan and medications. Rpt labs around next visit    4. Chronic obstructive pulmonary disease, unspecified COPD type ([address]  The current medical regimen is effective;  continue present plan and medications. 5. Anemia, unspecified type  Mild last check.   Rpt labs  - CBC

## 2019-10-01 ENCOUNTER — OFFICE VISIT (OUTPATIENT)
Dept: FAMILY MEDICINE CLINIC | Age: 74
End: 2019-10-01
Payer: MEDICARE

## 2019-10-01 VITALS
HEART RATE: 64 BPM | BODY MASS INDEX: 32.3 KG/M2 | DIASTOLIC BLOOD PRESSURE: 64 MMHG | WEIGHT: 225.6 LBS | SYSTOLIC BLOOD PRESSURE: 118 MMHG | OXYGEN SATURATION: 98 % | HEIGHT: 70 IN | RESPIRATION RATE: 12 BRPM

## 2019-10-01 DIAGNOSIS — C61 PROSTATE CANCER (HCC): ICD-10-CM

## 2019-10-01 DIAGNOSIS — E78.2 MIXED HYPERLIPIDEMIA: ICD-10-CM

## 2019-10-01 DIAGNOSIS — D64.9 ANEMIA, UNSPECIFIED TYPE: ICD-10-CM

## 2019-10-01 DIAGNOSIS — Z00.00 ROUTINE GENERAL MEDICAL EXAMINATION AT A HEALTH CARE FACILITY: ICD-10-CM

## 2019-10-01 DIAGNOSIS — Z23 NEEDS FLU SHOT: ICD-10-CM

## 2019-10-01 LAB
BASOPHILS ABSOLUTE: 0 K/UL (ref 0–0.2)
BASOPHILS RELATIVE PERCENT: 1.1 %
EOSINOPHILS ABSOLUTE: 0.1 K/UL (ref 0–0.6)
EOSINOPHILS RELATIVE PERCENT: 2.4 %
HCT VFR BLD CALC: 39.3 % (ref 40.5–52.5)
HEMOGLOBIN: 13.6 G/DL (ref 13.5–17.5)
LYMPHOCYTES ABSOLUTE: 1.1 K/UL (ref 1–5.1)
LYMPHOCYTES RELATIVE PERCENT: 31.5 %
MCH RBC QN AUTO: 33.9 PG (ref 26–34)
MCHC RBC AUTO-ENTMCNC: 34.6 G/DL (ref 31–36)
MCV RBC AUTO: 98.2 FL (ref 80–100)
MONOCYTES ABSOLUTE: 0.4 K/UL (ref 0–1.3)
MONOCYTES RELATIVE PERCENT: 10.3 %
NEUTROPHILS ABSOLUTE: 1.9 K/UL (ref 1.7–7.7)
NEUTROPHILS RELATIVE PERCENT: 54.7 %
PDW BLD-RTO: 13 % (ref 12.4–15.4)
PLATELET # BLD: 188 K/UL (ref 135–450)
PMV BLD AUTO: 9.7 FL (ref 5–10.5)
RBC # BLD: 4 M/UL (ref 4.2–5.9)
WBC # BLD: 3.5 K/UL (ref 4–11)

## 2019-10-01 PROCEDURE — 90653 IIV ADJUVANT VACCINE IM: CPT | Performed by: FAMILY MEDICINE

## 2019-10-01 PROCEDURE — 3017F COLORECTAL CA SCREEN DOC REV: CPT | Performed by: FAMILY MEDICINE

## 2019-10-01 PROCEDURE — G8482 FLU IMMUNIZE ORDER/ADMIN: HCPCS | Performed by: FAMILY MEDICINE

## 2019-10-01 PROCEDURE — 4040F PNEUMOC VAC/ADMIN/RCVD: CPT | Performed by: FAMILY MEDICINE

## 2019-10-01 PROCEDURE — G0439 PPPS, SUBSEQ VISIT: HCPCS | Performed by: FAMILY MEDICINE

## 2019-10-01 PROCEDURE — G0008 ADMIN INFLUENZA VIRUS VAC: HCPCS | Performed by: FAMILY MEDICINE

## 2019-10-01 PROCEDURE — G8598 ASA/ANTIPLAT THER USED: HCPCS | Performed by: FAMILY MEDICINE

## 2019-10-01 PROCEDURE — 1123F ACP DISCUSS/DSCN MKR DOCD: CPT | Performed by: FAMILY MEDICINE

## 2019-10-01 PROCEDURE — 36415 COLL VENOUS BLD VENIPUNCTURE: CPT | Performed by: FAMILY MEDICINE

## 2019-10-01 ASSESSMENT — PATIENT HEALTH QUESTIONNAIRE - PHQ9
SUM OF ALL RESPONSES TO PHQ QUESTIONS 1-9: 0
SUM OF ALL RESPONSES TO PHQ QUESTIONS 1-9: 0

## 2019-10-01 ASSESSMENT — LIFESTYLE VARIABLES: HOW OFTEN DO YOU HAVE A DRINK CONTAINING ALCOHOL: 0

## 2019-10-02 DIAGNOSIS — R79.89 ABNORMAL CBC: Primary | ICD-10-CM

## 2019-10-02 LAB
A/G RATIO: 1.9 (ref 1.1–2.2)
ALBUMIN SERPL-MCNC: 4.4 G/DL (ref 3.4–5)
ALP BLD-CCNC: 88 U/L (ref 40–129)
ALT SERPL-CCNC: 21 U/L (ref 10–40)
ANION GAP SERPL CALCULATED.3IONS-SCNC: 13 MMOL/L (ref 3–16)
AST SERPL-CCNC: 25 U/L (ref 15–37)
BILIRUB SERPL-MCNC: 0.4 MG/DL (ref 0–1)
BUN BLDV-MCNC: 21 MG/DL (ref 7–20)
CALCIUM SERPL-MCNC: 9.6 MG/DL (ref 8.3–10.6)
CHLORIDE BLD-SCNC: 105 MMOL/L (ref 99–110)
CHOLESTEROL, TOTAL: 130 MG/DL (ref 0–199)
CO2: 24 MMOL/L (ref 21–32)
CREAT SERPL-MCNC: 1 MG/DL (ref 0.8–1.3)
GFR AFRICAN AMERICAN: >60
GFR NON-AFRICAN AMERICAN: >60
GLOBULIN: 2.3 G/DL
GLUCOSE BLD-MCNC: 96 MG/DL (ref 70–99)
HDLC SERPL-MCNC: 59 MG/DL (ref 40–60)
LDL CHOLESTEROL CALCULATED: 59 MG/DL
POTASSIUM SERPL-SCNC: 4.9 MMOL/L (ref 3.5–5.1)
PROSTATE SPECIFIC ANTIGEN: <0.01 NG/ML (ref 0–4)
SODIUM BLD-SCNC: 142 MMOL/L (ref 136–145)
TOTAL PROTEIN: 6.7 G/DL (ref 6.4–8.2)
TRIGL SERPL-MCNC: 59 MG/DL (ref 0–150)
VLDLC SERPL CALC-MCNC: 12 MG/DL

## 2019-11-07 ENCOUNTER — OFFICE VISIT (OUTPATIENT)
Dept: FAMILY MEDICINE CLINIC | Age: 74
End: 2019-11-07
Payer: MEDICARE

## 2019-11-07 VITALS
DIASTOLIC BLOOD PRESSURE: 68 MMHG | WEIGHT: 233 LBS | HEIGHT: 69 IN | SYSTOLIC BLOOD PRESSURE: 132 MMHG | HEART RATE: 50 BPM | BODY MASS INDEX: 34.51 KG/M2 | OXYGEN SATURATION: 98 %

## 2019-11-07 DIAGNOSIS — C61 PROSTATE CANCER (HCC): ICD-10-CM

## 2019-11-07 DIAGNOSIS — K21.9 GASTROESOPHAGEAL REFLUX DISEASE WITHOUT ESOPHAGITIS: ICD-10-CM

## 2019-11-07 DIAGNOSIS — E78.2 MIXED HYPERLIPIDEMIA: Primary | ICD-10-CM

## 2019-11-07 DIAGNOSIS — R79.89 ABNORMAL CBC: ICD-10-CM

## 2019-11-07 PROBLEM — I10 ESSENTIAL HYPERTENSION: Status: RESOLVED | Noted: 2018-05-22 | Resolved: 2019-11-07

## 2019-11-07 PROCEDURE — G8482 FLU IMMUNIZE ORDER/ADMIN: HCPCS | Performed by: FAMILY MEDICINE

## 2019-11-07 PROCEDURE — 4040F PNEUMOC VAC/ADMIN/RCVD: CPT | Performed by: FAMILY MEDICINE

## 2019-11-07 PROCEDURE — G8598 ASA/ANTIPLAT THER USED: HCPCS | Performed by: FAMILY MEDICINE

## 2019-11-07 PROCEDURE — G8417 CALC BMI ABV UP PARAM F/U: HCPCS | Performed by: FAMILY MEDICINE

## 2019-11-07 PROCEDURE — 1123F ACP DISCUSS/DSCN MKR DOCD: CPT | Performed by: FAMILY MEDICINE

## 2019-11-07 PROCEDURE — G8427 DOCREV CUR MEDS BY ELIG CLIN: HCPCS | Performed by: FAMILY MEDICINE

## 2019-11-07 PROCEDURE — 99214 OFFICE O/P EST MOD 30 MIN: CPT | Performed by: FAMILY MEDICINE

## 2019-11-07 PROCEDURE — 1036F TOBACCO NON-USER: CPT | Performed by: FAMILY MEDICINE

## 2019-11-07 PROCEDURE — 3017F COLORECTAL CA SCREEN DOC REV: CPT | Performed by: FAMILY MEDICINE

## 2019-11-07 RX ORDER — OMEPRAZOLE 20 MG/1
40 CAPSULE, DELAYED RELEASE ORAL DAILY
Qty: 180 CAPSULE | Refills: 4
Start: 2019-11-07 | End: 2019-12-12 | Stop reason: SDUPTHER

## 2019-11-07 ASSESSMENT — ENCOUNTER SYMPTOMS
CHEST TIGHTNESS: 0
BLOOD IN STOOL: 0
DIARRHEA: 1
CONSTIPATION: 1

## 2019-12-11 ENCOUNTER — OFFICE VISIT (OUTPATIENT)
Dept: FAMILY MEDICINE CLINIC | Age: 74
End: 2019-12-11
Payer: MEDICARE

## 2019-12-11 VITALS
DIASTOLIC BLOOD PRESSURE: 64 MMHG | TEMPERATURE: 97.7 F | SYSTOLIC BLOOD PRESSURE: 120 MMHG | HEART RATE: 56 BPM | BODY MASS INDEX: 34.76 KG/M2 | WEIGHT: 232 LBS | OXYGEN SATURATION: 98 %

## 2019-12-11 DIAGNOSIS — J30.89 SEASONAL ALLERGIC RHINITIS DUE TO OTHER ALLERGIC TRIGGER: ICD-10-CM

## 2019-12-11 DIAGNOSIS — Z91.81 AT HIGH RISK FOR FALLS: Primary | ICD-10-CM

## 2019-12-11 PROCEDURE — G8598 ASA/ANTIPLAT THER USED: HCPCS | Performed by: NURSE PRACTITIONER

## 2019-12-11 PROCEDURE — 1036F TOBACCO NON-USER: CPT | Performed by: NURSE PRACTITIONER

## 2019-12-11 PROCEDURE — 3017F COLORECTAL CA SCREEN DOC REV: CPT | Performed by: NURSE PRACTITIONER

## 2019-12-11 PROCEDURE — G8427 DOCREV CUR MEDS BY ELIG CLIN: HCPCS | Performed by: NURSE PRACTITIONER

## 2019-12-11 PROCEDURE — 1123F ACP DISCUSS/DSCN MKR DOCD: CPT | Performed by: NURSE PRACTITIONER

## 2019-12-11 PROCEDURE — G8482 FLU IMMUNIZE ORDER/ADMIN: HCPCS | Performed by: NURSE PRACTITIONER

## 2019-12-11 PROCEDURE — G8417 CALC BMI ABV UP PARAM F/U: HCPCS | Performed by: NURSE PRACTITIONER

## 2019-12-11 PROCEDURE — 99213 OFFICE O/P EST LOW 20 MIN: CPT | Performed by: NURSE PRACTITIONER

## 2019-12-11 PROCEDURE — 4040F PNEUMOC VAC/ADMIN/RCVD: CPT | Performed by: NURSE PRACTITIONER

## 2019-12-11 ASSESSMENT — ENCOUNTER SYMPTOMS
EYE ITCHING: 0
SHORTNESS OF BREATH: 0
EYE DISCHARGE: 0
VOMITING: 0
RHINORRHEA: 1
VOICE CHANGE: 0
DIARRHEA: 0
WHEEZING: 0
CHEST TIGHTNESS: 0
SINUS PRESSURE: 1
EYE REDNESS: 0
EYE PAIN: 0
COLOR CHANGE: 0
BLOOD IN STOOL: 0
NAUSEA: 0
HOARSE VOICE: 1
SORE THROAT: 1
SINUS PAIN: 1
PHOTOPHOBIA: 0
TROUBLE SWALLOWING: 0
BACK PAIN: 0
ABDOMINAL PAIN: 0
STRIDOR: 0
CHOKING: 0
COUGH: 1
CONSTIPATION: 0

## 2019-12-12 DIAGNOSIS — K21.9 GASTROESOPHAGEAL REFLUX DISEASE WITHOUT ESOPHAGITIS: ICD-10-CM

## 2019-12-12 RX ORDER — OMEPRAZOLE 20 MG/1
40 CAPSULE, DELAYED RELEASE ORAL DAILY
Qty: 180 CAPSULE | Refills: 4 | Status: SHIPPED | OUTPATIENT
Start: 2019-12-12 | End: 2020-07-06 | Stop reason: SDUPTHER

## 2019-12-13 ENCOUNTER — TELEPHONE (OUTPATIENT)
Dept: FAMILY MEDICINE CLINIC | Age: 74
End: 2019-12-13

## 2019-12-13 DIAGNOSIS — J01.10 ACUTE NON-RECURRENT FRONTAL SINUSITIS: Primary | ICD-10-CM

## 2019-12-13 RX ORDER — CEFDINIR 300 MG/1
300 CAPSULE ORAL 2 TIMES DAILY
Qty: 20 CAPSULE | Refills: 0 | Status: SHIPPED | OUTPATIENT
Start: 2019-12-13 | End: 2019-12-23

## 2019-12-30 ENCOUNTER — NURSE ONLY (OUTPATIENT)
Dept: FAMILY MEDICINE CLINIC | Age: 74
End: 2019-12-30
Payer: MEDICARE

## 2019-12-30 DIAGNOSIS — R79.89 ABNORMAL CBC: Primary | ICD-10-CM

## 2019-12-30 LAB
BASOPHILS ABSOLUTE: 0.1 K/UL (ref 0–0.2)
BASOPHILS RELATIVE PERCENT: 1.3 %
EOSINOPHILS ABSOLUTE: 0.2 K/UL (ref 0–0.6)
EOSINOPHILS RELATIVE PERCENT: 3.1 %
HCT VFR BLD CALC: 41.1 % (ref 40.5–52.5)
HEMOGLOBIN: 14.4 G/DL (ref 13.5–17.5)
LYMPHOCYTES ABSOLUTE: 1.7 K/UL (ref 1–5.1)
LYMPHOCYTES RELATIVE PERCENT: 28.7 %
MCH RBC QN AUTO: 34.5 PG (ref 26–34)
MCHC RBC AUTO-ENTMCNC: 35 G/DL (ref 31–36)
MCV RBC AUTO: 98.5 FL (ref 80–100)
MONOCYTES ABSOLUTE: 0.6 K/UL (ref 0–1.3)
MONOCYTES RELATIVE PERCENT: 10.1 %
NEUTROPHILS ABSOLUTE: 3.4 K/UL (ref 1.7–7.7)
NEUTROPHILS RELATIVE PERCENT: 56.8 %
PDW BLD-RTO: 12.7 % (ref 12.4–15.4)
PLATELET # BLD: 156 K/UL (ref 135–450)
PLATELET SLIDE REVIEW: ADEQUATE
PMV BLD AUTO: 9.9 FL (ref 5–10.5)
RBC # BLD: 4.17 M/UL (ref 4.2–5.9)
SLIDE REVIEW: ABNORMAL
WBC # BLD: 6 K/UL (ref 4–11)

## 2019-12-30 PROCEDURE — 36415 COLL VENOUS BLD VENIPUNCTURE: CPT | Performed by: FAMILY MEDICINE

## 2019-12-30 RX ORDER — FLUTICASONE FUROATE AND VILANTEROL 200; 25 UG/1; UG/1
1 POWDER RESPIRATORY (INHALATION) DAILY
Qty: 3 EACH | Refills: 3 | Status: SHIPPED | OUTPATIENT
Start: 2019-12-30 | End: 2021-01-04

## 2020-01-30 RX ORDER — ATORVASTATIN CALCIUM 20 MG/1
20 TABLET, FILM COATED ORAL DAILY
Qty: 90 TABLET | Refills: 3 | Status: SHIPPED | OUTPATIENT
Start: 2020-01-30 | End: 2020-01-31 | Stop reason: SDUPTHER

## 2020-02-03 RX ORDER — ATORVASTATIN CALCIUM 20 MG/1
20 TABLET, FILM COATED ORAL DAILY
Qty: 90 TABLET | Refills: 3 | Status: SHIPPED | OUTPATIENT
Start: 2020-02-03 | End: 2021-02-02

## 2020-05-07 ENCOUNTER — VIRTUAL VISIT (OUTPATIENT)
Dept: FAMILY MEDICINE CLINIC | Age: 75
End: 2020-05-07
Payer: MEDICARE

## 2020-05-07 VITALS — TEMPERATURE: 96.8 F | DIASTOLIC BLOOD PRESSURE: 72 MMHG | SYSTOLIC BLOOD PRESSURE: 128 MMHG | HEART RATE: 62 BPM

## 2020-05-07 PROCEDURE — 99442 PR PHYS/QHP TELEPHONE EVALUATION 11-20 MIN: CPT | Performed by: FAMILY MEDICINE

## 2020-05-07 ASSESSMENT — PATIENT HEALTH QUESTIONNAIRE - PHQ9
SUM OF ALL RESPONSES TO PHQ QUESTIONS 1-9: 0
1. LITTLE INTEREST OR PLEASURE IN DOING THINGS: 0
2. FEELING DOWN, DEPRESSED OR HOPELESS: 0
SUM OF ALL RESPONSES TO PHQ9 QUESTIONS 1 & 2: 0
SUM OF ALL RESPONSES TO PHQ QUESTIONS 1-9: 0

## 2020-05-19 ENCOUNTER — NURSE ONLY (OUTPATIENT)
Dept: FAMILY MEDICINE CLINIC | Age: 75
End: 2020-05-19
Payer: MEDICARE

## 2020-05-19 PROCEDURE — 90714 TD VACC NO PRESV 7 YRS+ IM: CPT | Performed by: FAMILY MEDICINE

## 2020-05-19 PROCEDURE — 90471 IMMUNIZATION ADMIN: CPT | Performed by: FAMILY MEDICINE

## 2020-07-06 RX ORDER — OMEPRAZOLE 20 MG/1
20 CAPSULE, DELAYED RELEASE ORAL DAILY
Qty: 90 CAPSULE | Refills: 3 | Status: SHIPPED | OUTPATIENT
Start: 2020-07-06 | End: 2021-08-18 | Stop reason: SDUPTHER

## 2020-07-06 NOTE — TELEPHONE ENCOUNTER
Pt states that he's only taking 20mg of Prilosec per day and it seems to be doing well for him. So, I made the adjustment on this RX.

## 2020-08-17 ENCOUNTER — NURSE ONLY (OUTPATIENT)
Dept: FAMILY MEDICINE CLINIC | Age: 75
End: 2020-08-17
Payer: MEDICARE

## 2020-08-17 LAB
A/G RATIO: 1.8 (ref 1.1–2.2)
ALBUMIN SERPL-MCNC: 4.4 G/DL (ref 3.4–5)
ALP BLD-CCNC: 94 U/L (ref 40–129)
ALT SERPL-CCNC: 23 U/L (ref 10–40)
ANION GAP SERPL CALCULATED.3IONS-SCNC: 14 MMOL/L (ref 3–16)
AST SERPL-CCNC: 31 U/L (ref 15–37)
BASOPHILS ABSOLUTE: 0 K/UL (ref 0–0.2)
BASOPHILS RELATIVE PERCENT: 0.5 %
BILIRUB SERPL-MCNC: 0.5 MG/DL (ref 0–1)
BUN BLDV-MCNC: 15 MG/DL (ref 7–20)
CALCIUM SERPL-MCNC: 9.3 MG/DL (ref 8.3–10.6)
CHLORIDE BLD-SCNC: 104 MMOL/L (ref 99–110)
CHOLESTEROL, TOTAL: 142 MG/DL (ref 0–199)
CO2: 22 MMOL/L (ref 21–32)
CREAT SERPL-MCNC: 0.6 MG/DL (ref 0.8–1.3)
EOSINOPHILS ABSOLUTE: 0.1 K/UL (ref 0–0.6)
EOSINOPHILS RELATIVE PERCENT: 3.7 %
GFR AFRICAN AMERICAN: >60
GFR NON-AFRICAN AMERICAN: >60
GLOBULIN: 2.5 G/DL
GLUCOSE BLD-MCNC: 91 MG/DL (ref 70–99)
HCT VFR BLD CALC: 40.8 % (ref 40.5–52.5)
HDLC SERPL-MCNC: 53 MG/DL (ref 40–60)
HEMOGLOBIN: 14 G/DL (ref 13.5–17.5)
LDL CHOLESTEROL CALCULATED: 77 MG/DL
LYMPHOCYTES ABSOLUTE: 1.3 K/UL (ref 1–5.1)
LYMPHOCYTES RELATIVE PERCENT: 31 %
MCH RBC QN AUTO: 33.5 PG (ref 26–34)
MCHC RBC AUTO-ENTMCNC: 34.3 G/DL (ref 31–36)
MCV RBC AUTO: 97.7 FL (ref 80–100)
MONOCYTES ABSOLUTE: 0.4 K/UL (ref 0–1.3)
MONOCYTES RELATIVE PERCENT: 10 %
NEUTROPHILS ABSOLUTE: 2.2 K/UL (ref 1.7–7.7)
NEUTROPHILS RELATIVE PERCENT: 54.8 %
PDW BLD-RTO: 13.1 % (ref 12.4–15.4)
PLATELET # BLD: 170 K/UL (ref 135–450)
PMV BLD AUTO: 9.8 FL (ref 5–10.5)
POTASSIUM SERPL-SCNC: 4.5 MMOL/L (ref 3.5–5.1)
PROSTATE SPECIFIC ANTIGEN: <0.01 NG/ML (ref 0–4)
RBC # BLD: 4.18 M/UL (ref 4.2–5.9)
SODIUM BLD-SCNC: 140 MMOL/L (ref 136–145)
T4 FREE: 1.2 NG/DL (ref 0.9–1.8)
TOTAL PROTEIN: 6.9 G/DL (ref 6.4–8.2)
TRIGL SERPL-MCNC: 61 MG/DL (ref 0–150)
TSH SERPL DL<=0.05 MIU/L-ACNC: 4.17 UIU/ML (ref 0.27–4.2)
VLDLC SERPL CALC-MCNC: 12 MG/DL
WBC # BLD: 4 K/UL (ref 4–11)

## 2020-08-17 PROCEDURE — 36415 COLL VENOUS BLD VENIPUNCTURE: CPT | Performed by: FAMILY MEDICINE

## 2020-08-17 NOTE — PROGRESS NOTES
Blood drawn per order. Needle size: 21 g  Site: L Antecubital.  First attempt successful Yes    Second attempt no    Pressure applied until bleeding stopped. Yes applied. Patient informed to call office or return if bleeding reoccurs and unable to stop.     Tubes drawn: 1 purple     2 red

## 2020-08-18 ENCOUNTER — OFFICE VISIT (OUTPATIENT)
Dept: FAMILY MEDICINE CLINIC | Age: 75
End: 2020-08-18
Payer: MEDICARE

## 2020-08-18 VITALS
HEART RATE: 54 BPM | TEMPERATURE: 97.1 F | DIASTOLIC BLOOD PRESSURE: 68 MMHG | BODY MASS INDEX: 35.73 KG/M2 | HEIGHT: 69 IN | SYSTOLIC BLOOD PRESSURE: 130 MMHG | WEIGHT: 241.2 LBS | OXYGEN SATURATION: 98 %

## 2020-08-18 PROCEDURE — G8926 SPIRO NO PERF OR DOC: HCPCS | Performed by: FAMILY MEDICINE

## 2020-08-18 PROCEDURE — 3017F COLORECTAL CA SCREEN DOC REV: CPT | Performed by: FAMILY MEDICINE

## 2020-08-18 PROCEDURE — 1123F ACP DISCUSS/DSCN MKR DOCD: CPT | Performed by: FAMILY MEDICINE

## 2020-08-18 PROCEDURE — 4040F PNEUMOC VAC/ADMIN/RCVD: CPT | Performed by: FAMILY MEDICINE

## 2020-08-18 PROCEDURE — 1036F TOBACCO NON-USER: CPT | Performed by: FAMILY MEDICINE

## 2020-08-18 PROCEDURE — G8427 DOCREV CUR MEDS BY ELIG CLIN: HCPCS | Performed by: FAMILY MEDICINE

## 2020-08-18 PROCEDURE — 99214 OFFICE O/P EST MOD 30 MIN: CPT | Performed by: FAMILY MEDICINE

## 2020-08-18 PROCEDURE — G8417 CALC BMI ABV UP PARAM F/U: HCPCS | Performed by: FAMILY MEDICINE

## 2020-08-18 PROCEDURE — 3023F SPIROM DOC REV: CPT | Performed by: FAMILY MEDICINE

## 2020-08-18 ASSESSMENT — ENCOUNTER SYMPTOMS
SHORTNESS OF BREATH: 0
DIARRHEA: 0
CONSTIPATION: 0

## 2020-08-18 NOTE — PROGRESS NOTES
Chief Complaint   Patient presents with    Hyperlipidemia    Foot Problem       HPI:  Vera Carlos is a 77 y.o. (: 1945) here today   for   Hyperlipidemia   This is a chronic problem. The current episode started more than 1 year ago. The problem is controlled. Pertinent negatives include no chest pain or shortness of breath. Current antihyperlipidemic treatment includes statins. The current treatment provides moderate improvement of lipids. There are no compliance problems. Risk factors for coronary artery disease include dyslipidemia and male sex. Foot Problem   This is a new problem. Associated symptoms include a rash. Pertinent negatives include no chest pain or fever. has had rash on foot. Present last couple of weeks. Itch initially. Improved. Has been spraying med. No known bug bites. Similar to other foot. ongoing issues w/ memory. No better. ? Slightly worse. Using breo daily. Some sob noted. Albuterol prn. Patient's medications, allergies, past medical, surgical, social and family histories were reviewed and updated as appropriate. ROS:  Review of Systems   Constitutional: Negative for fever. Respiratory: Negative for shortness of breath. Cardiovascular: Negative for chest pain. Gastrointestinal: Negative for constipation and diarrhea. Skin: Positive for rash.            LDL Calculated (mg/dL)   Date Value   2020 77       Past Medical History:   Diagnosis Date    Asthma     CAD (coronary artery disease)     Celiac sprue     COPD (chronic obstructive pulmonary disease) (HCC)     DDD (degenerative disc disease), lumbar     Fatty liver     HTN (hypertension)     Hyperlipidemia     Paget's disease     Prostate cancer ([facility] [address] 2009       Family History   Problem Relation Age of Onset    Other Mother         macular degeneration, DVT     Colon Cancer Father     Other Brother         celiac    High Blood Pressure Brother     Diabetes Brother  Heart Attack Maternal Grandmother     Heart Attack Maternal Grandfather     Stroke Maternal Grandfather     Heart Attack Paternal Grandfather     Other Paternal Uncle         stomach cancer       Social History     Socioeconomic History    Marital status:      Spouse name: Not on file    Number of children: Not on file    Years of education: Not on file    Highest education level: Not on file   Occupational History    Occupation: [employer] Financial resource strain: Not on file    Food insecurity     Worry: Not on file     Inability: Not on file   [employer] needs     Medical: Not on file     Non-medical: Not on file   Tobacco Use    Smoking status: Former Smoker     Packs/day: 0.10     Years: 4.00     Pack years: 0.40     Types: Cigars     Start date: 1964     Last attempt to quit: 1970     Years since quittin.6    Smokeless tobacco: Never Used   Substance and Sexual Activity    Alcohol use: No    Drug use: No    Sexual activity: Yes   Lifestyle    Physical activity     Days per week: Not on file     Minutes per session: Not on file    Stress: Not on file   Relationships    Social connections     Talks on phone: Not on file     Gets together: Not on file     Attends Pentecostal service: Not on file     Active member of club or organization: Not on file     Attends meetings of clubs or organizations: Not on file     Relationship status: Not on file    Intimate partner violence     Fear of current or ex partner: Not on file     Emotionally abused: Not on file     Physically abused: Not on file     Forced sexual activity: Not on file   Other Topics Concern    Not on file   Social History Narrative    Not on file       Prior to Visit Medications    Medication Sig Taking?  Authorizing Provider   omeprazole (PRILOSEC) 20 MG delayed release capsule Take 1 capsule by mouth Daily Yes Nereyda Noriega APRN - CNP   atorvastatin (LIPITOR) 20 MG tablet Take 1 tablet by mouth daily Yes DONTA Ryan CNP   Fluticasone furoate-vilanterol (BREO ELLIPTA) 200-25 MCG/INH AEPB inhaler Inhale 1 puff into the lungs daily Yes DONTA Ryan CNP   fluticasone [facility]) 50 MCG/ACT nasal spray 2 sprays by Nasal route daily Yes Jenni Schuler MD   albuterol sulfate HFA (PROVENTIL HFA) 108 (90 Base) MCG/ACT inhaler Inhale 2 puffs into the lungs every 6 hours as needed for Wheezing Yes Jenni Schuler MD   Flaxseed, Linseed, (FLAXSEED OIL) 1000 MG CAPS Take 1,000 mg by mouth daily Yes Historical Provider, MD   Coenzyme Q10 (CO Q-10) 200 MG CAPS Take 200 mg by mouth daily Yes Historical Provider, MD   Diphenhydramine-APAP, sleep, (TYLENOL PM EXTRA STRENGTH PO) Take by mouth as needed Yes Historical Provider, MD   Melatonin 10 MG CAPS Take 10 mg by mouth nightly Yes Historical Provider, MD   Glucosamine-Chondroitin-Ca-D3 (TRIPLE FLEX 50+ PO) Take 2 tablets by mouth Daily. Yes Historical Provider, MD   Calcium-Magnesium-Vitamin D (CALCIUM MAGNESIUM PO) Take 1 tablet by mouth Daily. Dose is 1200/400  Yes Historical Provider, MD   acetaminophen (TYLENOL) 325 MG tablet Take 1,000 mg by mouth as needed. Yes Historical Provider, MD   Casanthranol-Docusate Sodium  MG CAPS Take 1 capsule by mouth as needed. LOC prn  Yes Historical Provider, MD   therapeutic multivitamin-minerals (THERAGRAN-M) tablet Take 1 tablet by mouth daily.    Yes Historical Provider, MD   aspirin 81 MG chewable tablet Take 81 mg by mouth once a week  Yes Historical Provider, MD       Allergies   Allergen Reactions    Pcn [Penicillins] Swelling    Wheat Extract Other (See Comments)     Celiac disease       OBJECTIVE:    /68   Pulse 54   Temp 97.1 °F (36.2 °C)   Ht 5' 8.5\" (1.74 m)   Wt 241 lb 3.2 oz (109.4 kg)   SpO2 98%   BMI 36.14 kg/m²     BP Readings from Last 2 Encounters:   08/18/20 130/68   05/07/20 128/72       Wt Readings from Last 3 Encounters:   08/18/20 241 lb 3.2 oz (109.4 kg)

## 2020-08-19 LAB — VITAMIN B-12: 979 PG/ML (ref 211–911)

## 2020-10-13 ENCOUNTER — NURSE ONLY (OUTPATIENT)
Dept: FAMILY MEDICINE CLINIC | Age: 75
End: 2020-10-13
Payer: MEDICARE

## 2020-10-13 PROCEDURE — G0008 ADMIN INFLUENZA VIRUS VAC: HCPCS | Performed by: FAMILY MEDICINE

## 2020-10-13 PROCEDURE — 90694 VACC AIIV4 NO PRSRV 0.5ML IM: CPT | Performed by: FAMILY MEDICINE

## 2020-10-13 NOTE — PROGRESS NOTES
Vaccine Information Sheet, \"Influenza - Inactivated\"  given to Chelsie Finn, or parent/legal guardian of  Chelsie Finn and verbalized understanding. Patient responses:    Have you ever had a reaction to a flu vaccine? No  Do you have any current illness? No  Have you ever had [place] Syndrome? No  Do you have a serious allergy to any of the follow: Neomycin, Polymyxin, Thimerosal, eggs or egg products? No    Flu vaccine given per order. Please see immunization tab. Risks and benefits explained. Current VIS given.       Immunizations Administered     Name Date Dose Route    Influenza, Quadv, adjuvanted, 65 yrs +, IM, PF (Fluad) 10/13/2020 0.5 mL Intramuscular    Site: Deltoid- Left    Lot: [id number]    NDC: 69004-441-79
no

## 2020-12-22 ENCOUNTER — VIRTUAL VISIT (OUTPATIENT)
Dept: FAMILY MEDICINE CLINIC | Age: 75
End: 2020-12-22
Payer: MEDICARE

## 2020-12-22 VITALS — HEIGHT: 69 IN | WEIGHT: 241 LBS | TEMPERATURE: 98 F | BODY MASS INDEX: 35.7 KG/M2

## 2020-12-22 PROCEDURE — 1123F ACP DISCUSS/DSCN MKR DOCD: CPT | Performed by: FAMILY MEDICINE

## 2020-12-22 PROCEDURE — 3017F COLORECTAL CA SCREEN DOC REV: CPT | Performed by: FAMILY MEDICINE

## 2020-12-22 PROCEDURE — 4040F PNEUMOC VAC/ADMIN/RCVD: CPT | Performed by: FAMILY MEDICINE

## 2020-12-22 PROCEDURE — G0439 PPPS, SUBSEQ VISIT: HCPCS | Performed by: FAMILY MEDICINE

## 2020-12-22 ASSESSMENT — PATIENT HEALTH QUESTIONNAIRE - PHQ9
SUM OF ALL RESPONSES TO PHQ9 QUESTIONS 1 & 2: 0
SUM OF ALL RESPONSES TO PHQ QUESTIONS 1-9: 0
1. LITTLE INTEREST OR PLEASURE IN DOING THINGS: 0
2. FEELING DOWN, DEPRESSED OR HOPELESS: 0
SUM OF ALL RESPONSES TO PHQ QUESTIONS 1-9: 0
SUM OF ALL RESPONSES TO PHQ QUESTIONS 1-9: 0

## 2020-12-22 ASSESSMENT — LIFESTYLE VARIABLES: HOW OFTEN DO YOU HAVE A DRINK CONTAINING ALCOHOL: 0

## 2020-12-22 NOTE — PROGRESS NOTES
Medicare Annual Wellness Visit  Name: Rody Chiu Date: 2020   MRN: <[id number]> Sex: Male   Age: 77 y.o. Ethnicity: Non-/Non    : 1945 Race: Loren Wheat is here for Medicare AWV    Screenings for behavioral, psychosocial and functional/safety risks, and cognitive dysfunction are all negative except as indicated below. These results, as well as other patient data from the [address] form, are documented in Flowsheets linked to this Encounter. Allergies   Allergen Reactions    Pcn [Penicillins] Swelling    Wheat Extract Other (See Comments)     Celiac disease         Prior to Visit Medications    Medication Sig Taking? Authorizing Provider   omeprazole (PRILOSEC) 20 MG delayed release capsule Take 1 capsule by mouth Daily  DONTA Ware CNP   atorvastatin (LIPITOR) 20 MG tablet Take 1 tablet by mouth daily  DONTA Ware CNP   Fluticasone furoate-vilanterol (BREO ELLIPTA) 200-25 MCG/INH AEPB inhaler Inhale 1 puff into the lungs daily  DONTA Ware CNP   fluticasone [facility]) 50 MCG/ACT nasal spray 2 sprays by Nasal route daily  Stephanie Castañeda MD   albuterol sulfate HFA (PROVENTIL HFA) 108 (90 Base) MCG/ACT inhaler Inhale 2 puffs into the lungs every 6 hours as needed for Wheezing  Stephanie Castañeda MD   Flaxseed, Linseed, (FLAXSEED OIL) 1000 MG CAPS Take 1,000 mg by mouth daily  Historical Provider, MD   Coenzyme Q10 (CO Q-10) 200 MG CAPS Take 200 mg by mouth daily  Historical Provider, MD   Diphenhydramine-APAP, sleep, (TYLENOL PM EXTRA STRENGTH PO) Take by mouth as needed  Historical Provider, MD   Melatonin 10 MG CAPS Take 10 mg by mouth nightly  Historical Provider, MD   Glucosamine-Chondroitin-Ca-D3 (TRIPLE FLEX 50+ PO) Take 2 tablets by mouth Daily. Historical Provider, MD   Calcium-Magnesium-Vitamin D (CALCIUM MAGNESIUM PO) Take 1 tablet by mouth Daily.  Dose is 1200/400   Historical Provider, MD acetaminophen (TYLENOL) 325 MG tablet Take 1,000 mg by mouth as needed. Historical Provider, MD   Casanthranol-Docusate Sodium  MG CAPS Take 1 capsule by mouth as needed. LOC prn   Historical Provider, MD   therapeutic multivitamin-minerals (THERAGRAN-M) tablet Take 1 tablet by mouth daily. Historical Provider, MD   aspirin 81 MG chewable tablet Take 81 mg by mouth once a week   Historical Provider, MD         Past Medical History:   Diagnosis Date    Asthma     CAD (coronary artery disease)     Celiac sprue     COPD (chronic obstructive pulmonary disease) ([facility] [place] [address]     DDD (degenerative disc disease), lumbar     Fatty liver     HTN (hypertension)     Hyperlipidemia     Paget's disease     Prostate cancer ([facility] [place] [address] 07/2009       Past Surgical History:   Procedure Laterality Date    COLONOSCOPY  11/2017    HERNIA REPAIR      PROSTATECTOMY      UPPER GASTROINTESTINAL ENDOSCOPY  11/19/2012         Family History   Problem Relation Age of Onset    Other Mother         macular degeneration, DVT     Colon Cancer Father     Other Brother         celiac    High Blood Pressure Brother     Diabetes Brother     Heart Attack Maternal Grandmother     Heart Attack Maternal Grandfather     Stroke Maternal Grandfather     Heart Attack Paternal Grandfather     Other Paternal Uncle         stomach cancer       CareTeam (Including outside providers/suppliers regularly involved in providing care):   Patient Care Team:  Pieter Contreras MD as PCP - General (Family Medicine)  Pieter Contreras MD as PCP [facility] Empaneled Provider    Wt Readings from Last 3 Encounters:   12/22/20 241 lb (109.3 kg)   08/18/20 241 lb 3.2 oz (109.4 kg)   12/11/19 232 lb (105.2 kg)     Vitals:    12/22/20 1021   Temp: 98 °F (36.7 °C)   Weight: 241 lb (109.3 kg)   Height: 5' 8.5\" (1.74 m)     Body mass index is 36.11 kg/m². Based upon direct observation of the patient, evaluation of cognition reveals recent and remote memory intact. Patient's complete Health Risk Assessment and screening values have been reviewed and are found in Flowsheets. The following problems were reviewed today and where indicated follow up appointments were made and/or referrals ordered.     Positive Risk Factor Screenings with Interventions:           Health Habits/Nutrition:  Health Habits/Nutrition  Do you exercise for at least 20 minutes 2-3 times per week?: Yes  Have you lost any weight without trying in the past 3 months?: No  Do you eat fewer than 2 meals per day?: No  Have you seen a dentist within the past year?: (!) No  Body mass index: (!) 36.11  Health Habits/Nutrition Interventions:  · Inadequate physical activity:  educational materials provided to promote increased physical activity  · Dental exam overdue:  patient encouraged to make appointment with his/her dentist       Personalized Preventive Plan   Current Health Maintenance Status  Immunization History   Administered Date(s) Administered    Influenza A ([id number]) Vaccine PF IM 11/30/2009    Influenza, High Dose (Fluzone 65 yrs and older) 09/01/2017, 12/13/2018    Influenza, Quadv, adjuvanted, 65 yrs +, IM, PF (Fluad) 10/13/2020    Influenza, Triv, inactivated, subunit, adjuvanted, IM (Fluad 65 yrs and older) 10/01/2019    Pneumococcal Conjugate 13-valent ([id number]) 01/30/2018    Pneumococcal Polysaccharide ([id number]) 05/15/2019    Td (Adult), 2 Lf Tetanus Toxoid, Pf (Td, Absorbed) 05/19/2020    Tdap (Boostrix, Adacel) 09/29/2011    Zoster Live (Zostavax) 05/17/2016        Health Maintenance   Topic Date Due    Shingles Vaccine (2 of 3) 07/12/2016    Annual Wellness Visit (AWV)  02/06/2020    Lipid screen  08/17/2021    PSA counseling  08/17/2021    Colon cancer screen colonoscopy  10/24/2027    DTaP/Tdap/Td vaccine (3 - Td) 05/19/2030    Flu vaccine  Completed    Pneumococcal 65+ years Vaccine  Completed    AAA screen  Completed    Hepatitis C screen  Completed This encounter was performed under myAndry MDs, direct supervision, 12/22/2020.

## 2020-12-22 NOTE — PATIENT INSTRUCTIONS
Personalized Preventive Plan for Mendez Gomes - 12/22/2020  Medicare offers a range of preventive health benefits. Some of the tests and screenings are paid in full while other may be subject to a deductible, co-insurance, and/or copay. Some of these benefits include a comprehensive review of your medical history including lifestyle, illnesses that may run in your family, and various assessments and screenings as appropriate. After reviewing your medical record and screening and assessments performed today your provider may have ordered immunizations, labs, imaging, and/or referrals for you. A list of these orders (if applicable) as well as your Preventive Care list are included within your After Visit Summary for your review. Other Preventive Recommendations:    · A preventive eye exam performed by an eye specialist is recommended every 1-2 years to screen for glaucoma; cataracts, macular degeneration, and other eye disorders. · A preventive dental visit is recommended every 6 months. · Try to get at least 150 minutes of exercise per week or 10,000 steps per day on a pedometer . · Order or download the FREE \"Exercise & Physical Activity: Your Everyday Guide\" from [employer]. Call 9-439.160.4245 or search [employer] online. · You need 3897-3821 mg of calcium and 6015-0898 IU of vitamin D per day. It is possible to meet your calcium requirement with diet alone, but a vitamin D supplement is usually necessary to meet this goal.  · When exposed to the sun, use a sunscreen that protects against both UVA and UVB radiation with an SPF of 30 or greater. Reapply every 2 to 3 hours or after sweating, drying off with a towel, or swimming. · Always wear a seat belt when traveling in a car. Always wear a helmet when riding a bicycle or motorcycle.     Keeping Home a [facility] As we get older, changes in balance, gait, strength, vision, hearing, and cognition make even the most youthful senior more prone to accidents. Falls are one of the leading health risks for older people. This increased risk of falling is related to:   Aging process (eg, decreased muscle strength, slowed reflexes)   Higher incidence of chronic health problems (eg, arthritis, diabetes) that may limit mobility, agility or sensory awareness   Side effects of medicine (eg, dizziness, blurred vision)especially medicines like prescription pain medicines and drugs used to treat mental health conditions   Depending on the brittleness of your bones, the consequences of a fall can be serious and long lasting. Home Life   Research by the Association of Aging [facility]) shows that some home accidents among older adults can be prevented by making simple lifestyle changes and basic modifications and repairs to the home environment. Here are some lifestyle changes that experts recommend:   Have your hearing and vision checked regularly. Be sure to wear prescription glasses that are right for you. Speak to your doctor or pharmacist about the possible side effects of your medicines. A number of medicines can cause dizziness. If you have problems with sleep, talk to your doctor. Limit your intake of alcohol. If necessary, use a cane or walker to help maintain your balance. Wear supportive, rubber-soled shoes, even at home. If you live in a region that gets wintry weather, you may want to put special cleats on your shoes to prevent you from slipping on the snow and ice. Exercise regularly to help maintain muscle tone, agility, and balance. Always hold the banister when going up or down stairs. Also, use  bars when getting in or out of the bath or shower, or using the toilet. To avoid dizziness, get up slowly from a lying down position. Sit up first, dangling your legs for a minute or two before rising to a standing position. Overall Home Safety Check   According to the Consumer [employer] Safety Commision's \"Older Consumer Home Safety Checklist,\" it is important to check for potential hazards in each room. And remember, proper lighting is an essential factor in home safety. If you cannot see clearly, you are more likely to fall. Important questions to ask yourself include:   Are lamp, electric, extension, and telephone cords placed out of the flow of traffic and maintained in good condition? Have frayed cords been replaced? Are all small rugs and runners slip resistant? If not, you can secure them to the floor with a special double-sided carpet tape. Are smoke detectors properly locatedone on every floor of your home and one outside of every sleeping area? Are they in good working order? Are batteries replaced at least once a year? Do you have a well-maintained carbon monoxide detector outside every sleeping are in your home? Does your furniture layout leave plenty of space to maneuver between and around chairs, tables, beds, and sofas? Are hallways, stairs and passages between rooms well lit? Can you reach a lamp without getting out of bed? Are floor surfaces well maintained? Shag rugs, high-pile carpeting, tile floors, and polished wood floors can be particularly slippery. Stairs should always have handrails and be carpeted or fitted with a non-skid tread. Is your telephone easily reachable. Is the cord safely tucked away? Room by Room   According to the Association of Aging, bathrooms and magi are the two most potentially hazardous rooms in your home. In the Kitchen    Be sure your stove is in proper working order and always make sure burners and the oven are off before you go out or go to sleep. Keep pots on the back burners, turn handles away from the front of the stove, and keep stove clean and free of grease build-up. Kitchen ventilation systems and range exhausts should be working properly. Keep flammable objects such as towels and pot holders away from the cooking area except when in use. Make sure kitchen curtains are tied back. Move cords and appliances away from the sink and hot surfaces. If extension cords are needed, install wiring guides so they do not hang over the sink, range, or working areas. Look for coffee pots, kettles and toaster ovens with automatic shut-offs. Keep a mop handy in the kitchen so you can wipe up spills instantly. You should also have a small fire extinguisher. Arrange your kitchen with frequently used items on lower shelves to avoid the need to stand on a stepstool to reach them. Make sure countertops are well-lit to avoid injuries while cutting and preparing food. In the Bathroom    Use a non-slip mat or decals in the tub and shower, since wet, soapy tile or porcelain surfaces are extremely slippery. Make sure bathroom rugs are non-skid or tape them firmly to the floor. Bathtubs should have at least one, preferably two, grab bars, firmly attached to structural supports in the wall. (Do not use built-in soap holders or glass shower doors as grab bars.)    Tub seats fitted with non-slip material on the legs allow you to wash sitting down. For people with limited mobility, bathtub transfer benches allow you to slide safely into the tub. Raised toilet seats and toilet safety rails are helpful for those with knee or hip problems. In the [facility]    Make sure you use a nightlight and that the area around your bed is clear of potential obstacles. Be careful with electric blankets and never go to sleep with a heating pad, which can cause serious burns even if on a low setting. Use fire-resistant mattress covers and pillows, and NEVER smoke in bed. Keep a phone next to the bed that is programmed to dial 911 at the push of a button. If you have a chronic condition, you may want to sign on with an automatic call-in service. Typically the system includes a small pendant that connects directly to an emergency medical voice-response system. You should also make arrangements to stay in contact with someonefriend, neighbor, family memberon a regular schedule. Fire Prevention   According to the [employer]. (Smoke Alarms for Every) [address] [place] [address], senior citizens are one of the two highest risk groups for death and serious injuries due to residential fires. When cooking, wear short-sleeved items, never a bulky long-sleeved robe. The [facility]'s Safety Checklist for Older Consumers emphasizes the importance of checking basements, garages, workshops and storage areas for fire hazards, such as volatile liquids, piles of old rags or clothing and overloaded circuits. Never smoke in bed or when lying down on a couch or recliner chair. Small portable electric or kerosene heaters are responsible for many home fires and should be used cautiously if at all. If you do use one, be sure to keep them away from flammable materials. In case of fire, make sure you have a pre-established emergency exit plan. Have a professional check your fireplace and other fuel-burning appliances yearly.     [employer]

## 2021-02-02 RX ORDER — ATORVASTATIN CALCIUM 20 MG/1
TABLET, FILM COATED ORAL
Qty: 90 TABLET | Refills: 3 | Status: SHIPPED | OUTPATIENT
Start: 2021-02-02 | End: 2022-02-15 | Stop reason: SDUPTHER

## 2021-02-22 ENCOUNTER — OFFICE VISIT (OUTPATIENT)
Dept: FAMILY MEDICINE CLINIC | Age: 76
End: 2021-02-22
Payer: MEDICARE

## 2021-02-22 VITALS
TEMPERATURE: 97.2 F | BODY MASS INDEX: 35.87 KG/M2 | DIASTOLIC BLOOD PRESSURE: 78 MMHG | HEART RATE: 56 BPM | SYSTOLIC BLOOD PRESSURE: 120 MMHG | WEIGHT: 242.2 LBS | HEIGHT: 69 IN | OXYGEN SATURATION: 98 %

## 2021-02-22 DIAGNOSIS — C61 PROSTATE CANCER (HCC): ICD-10-CM

## 2021-02-22 DIAGNOSIS — J44.9 CHRONIC OBSTRUCTIVE PULMONARY DISEASE, UNSPECIFIED COPD TYPE (HCC): Primary | ICD-10-CM

## 2021-02-22 DIAGNOSIS — E78.2 MIXED HYPERLIPIDEMIA: ICD-10-CM

## 2021-02-22 DIAGNOSIS — K90.0 ADULT CELIAC DISEASE: ICD-10-CM

## 2021-02-22 DIAGNOSIS — I25.10 CORONARY ARTERY DISEASE INVOLVING NATIVE CORONARY ARTERY OF NATIVE HEART WITHOUT ANGINA PECTORIS: ICD-10-CM

## 2021-02-22 DIAGNOSIS — Z86.010 HISTORY OF COLON POLYPS: ICD-10-CM

## 2021-02-22 PROCEDURE — G8427 DOCREV CUR MEDS BY ELIG CLIN: HCPCS | Performed by: FAMILY MEDICINE

## 2021-02-22 PROCEDURE — 3023F SPIROM DOC REV: CPT | Performed by: FAMILY MEDICINE

## 2021-02-22 PROCEDURE — 1036F TOBACCO NON-USER: CPT | Performed by: FAMILY MEDICINE

## 2021-02-22 PROCEDURE — 4040F PNEUMOC VAC/ADMIN/RCVD: CPT | Performed by: FAMILY MEDICINE

## 2021-02-22 PROCEDURE — 3017F COLORECTAL CA SCREEN DOC REV: CPT | Performed by: FAMILY MEDICINE

## 2021-02-22 PROCEDURE — 1123F ACP DISCUSS/DSCN MKR DOCD: CPT | Performed by: FAMILY MEDICINE

## 2021-02-22 PROCEDURE — G8417 CALC BMI ABV UP PARAM F/U: HCPCS | Performed by: FAMILY MEDICINE

## 2021-02-22 PROCEDURE — G8484 FLU IMMUNIZE NO ADMIN: HCPCS | Performed by: FAMILY MEDICINE

## 2021-02-22 PROCEDURE — 99214 OFFICE O/P EST MOD 30 MIN: CPT | Performed by: FAMILY MEDICINE

## 2021-02-22 PROCEDURE — G8926 SPIRO NO PERF OR DOC: HCPCS | Performed by: FAMILY MEDICINE

## 2021-02-22 RX ORDER — MULTIVIT-MIN/IRON/FOLIC ACID/K 18-600-40
CAPSULE ORAL DAILY
COMMUNITY
End: 2021-08-18

## 2021-02-22 SDOH — ECONOMIC STABILITY: FOOD INSECURITY: WITHIN THE PAST 12 MONTHS, THE FOOD YOU BOUGHT JUST DIDN'T LAST AND YOU DIDN'T HAVE MONEY TO GET MORE.: NEVER TRUE

## 2021-02-22 SDOH — ECONOMIC STABILITY: TRANSPORTATION INSECURITY
IN THE PAST 12 MONTHS, HAS THE LACK OF TRANSPORTATION KEPT YOU FROM MEDICAL APPOINTMENTS OR FROM GETTING MEDICATIONS?: NO

## 2021-02-22 SDOH — ECONOMIC STABILITY: FOOD INSECURITY: WITHIN THE PAST 12 MONTHS, YOU WORRIED THAT YOUR FOOD WOULD RUN OUT BEFORE YOU GOT MONEY TO BUY MORE.: NEVER TRUE

## 2021-02-22 ASSESSMENT — PATIENT HEALTH QUESTIONNAIRE - PHQ9
SUM OF ALL RESPONSES TO PHQ9 QUESTIONS 1 & 2: 0
SUM OF ALL RESPONSES TO PHQ QUESTIONS 1-9: 0

## 2021-02-22 ASSESSMENT — COPD QUESTIONNAIRES: COPD: 1

## 2021-02-22 ASSESSMENT — ENCOUNTER SYMPTOMS
SHORTNESS OF BREATH: 0
CONSTIPATION: 0
BLOOD IN STOOL: 0
DIARRHEA: 0

## 2021-02-22 NOTE — PROGRESS NOTES
Chief Complaint   Patient presents with    Hyperlipidemia       HPI:  Madeline Mejia is a 77 y.o. (: 1945) here today   for   Hyperlipidemia  This is a chronic problem. The current episode started more than 1 year ago. Pertinent negatives include no chest pain or shortness of breath. Current antihyperlipidemic treatment includes statins. There are no compliance problems. Risk factors for coronary artery disease include dyslipidemia and male sex. COPD  There is no shortness of breath. This is a chronic problem. The problem has been unchanged. Pertinent negatives include no chest pain or fever. His symptoms are aggravated by change in weather. His symptoms are alleviated by steroid inhaler and beta-agonist. He reports moderate improvement on treatment. His past medical history is significant for COPD.   rarely using albuterol. Hx of prostate cancer. Has not seen urology recently. Last psa ok. Currently being done annually. Has not seen by cardio in some time. No new issues. Hx of celiac. Colon cancer recently dx in brother. Dad w/ colon cancer as well. Was having right knee pain recently. Has improved somewhat.  ? Twisted. Patient's medications, allergies, past medical, surgical, social and family histories were reviewed and updated as appropriate. ROS:  Review of Systems   Constitutional: Negative for fever. Respiratory: Negative for shortness of breath. Cardiovascular: Negative for chest pain. Gastrointestinal: Negative for blood in stool, constipation and diarrhea. Musculoskeletal: Positive for arthralgias.            LDL Calculated (mg/dL)   Date Value   2020 77       Past Medical History:   Diagnosis Date    Asthma     CAD (coronary artery disease)     Celiac sprue     COPD (chronic obstructive pulmonary disease) (HCC)     DDD (degenerative disc disease), lumbar     Fatty liver     HTN (hypertension)     Hyperlipidemia     Paget's disease     [facility]       Family History   Problem Relation Age of Onset    Other Mother         macular degeneration, DVT     Colon Cancer Father     Other Brother         celiac    High Blood Pressure Brother     Diabetes Brother     Heart Attack Maternal Grandmother     Heart Attack Maternal Grandfather     Stroke Maternal Grandfather     Heart Attack Paternal Grandfather     Other Paternal Uncle         stomach cancer       Social History     Socioeconomic History    Marital status:      Spouse name: Not on file    Number of children: Not on file    Years of education: Not on file    Highest education level: Not on file   Occupational History    Occupation: [employer] Financial resource strain: Not hard at all   Arlettie insecurity     Worry: Never true     Inability: Never true   [email or web address] needs     Medical: No     Non-medical: No   Tobacco Use    Smoking status: Former Smoker     Packs/day: 0.10     Years: 4.00     Pack years: 0.40     Types: Cigars     Start date: 1964     Quit date: 1970     Years since quittin.1    Smokeless tobacco: Never Used   Substance and Sexual Activity    Alcohol use: No    Drug use: No    Sexual activity: Yes   Lifestyle    Physical activity     Days per week: Not on file     Minutes per session: Not on file    Stress: Not on file   Relationships    Social connections     Talks on phone: Not on file     Gets together: Not on file     Attends Tenriism service: Not on file     Active member of club or organization: Not on file     Attends meetings of clubs or organizations: Not on file     Relationship status: Not on file    Intimate partner violence     Fear of current or ex partner: Not on file     Emotionally abused: Not on file     Physically abused: Not on file     Forced sexual activity: Not on file   Other Topics Concern    Not on file   Social History Narrative    Not on file       Prior to Visit Medications    Medication Sig Taking? Authorizing Provider   B Complex-C (SUPER B COMPLEX PO) Take by mouth daily Yes Historical Provider, MD   ZINC PO Take by mouth daily Yes Historical Provider, MD   Cholecalciferol (VITAMIN D3) 125 MCG (5000 UT) TABS Take by mouth daily Yes Historical Provider, MD   Ascorbic Acid (VITAMIN C) 500 MG CAPS Take by mouth daily Yes Historical Provider, MD   Omega 3-6-9 Fatty Acids (OMEGA-3-6-9 PO) Take by mouth daily Yes Historical Provider, MD   atorvastatin (LIPITOR) 20 MG tablet TAKE ONE TABLET BY MOUTH EVERY DAY Yes DONTA Campos CNP   BREO ELLIPTA 200-25 MCG/INH AEPB inhaler INHALE ONE PUFF BY MOUTH DAILY Yes DONTA Campos CNP   omeprazole (PRILOSEC) 20 MG delayed release capsule Take 1 capsule by mouth Daily Yes DONTA Campos CNP   albuterol sulfate HFA (PROVENTIL HFA) 108 (90 Base) MCG/ACT inhaler Inhale 2 puffs into the lungs every 6 hours as needed for Wheezing Yes Nadiya Elaine MD   Coenzyme Q10 (CO Q-10) 200 MG CAPS Take 200 mg by mouth daily Yes Historical Provider, MD   Melatonin 10 MG CAPS Take 10 mg by mouth nightly Yes Historical Provider, MD   Glucosamine-Chondroitin-Ca-D3 (TRIPLE FLEX 50+ PO) Take 2 tablets by mouth Daily. Yes Historical Provider, MD   Calcium-Magnesium-Vitamin D (CALCIUM MAGNESIUM PO) Take 1 tablet by mouth Daily. Dose is 1200/400  Yes Historical Provider, MD   acetaminophen (TYLENOL) 325 MG tablet Take 1,000 mg by mouth as needed. Yes Historical Provider, MD   therapeutic multivitamin-minerals (THERAGRAN-M) tablet Take 1 tablet by mouth daily.    Yes Historical Provider, MD   aspirin 81 MG chewable tablet Take 81 mg by mouth once a week  Yes Historical Provider, MD       Allergies   Allergen Reactions    Pcn [Penicillins] Swelling    Wheat Extract Other (See Comments)     Celiac disease       OBJECTIVE:    /78   Pulse 56   Temp 97.2 °F (36.2 °C)   Ht 5' 8.5\" (1.74 m)   Wt 242 lb 3.2 oz (109.9 kg)   SpO2 98% BMI 36.29 kg/m²     BP Readings from Last 2 Encounters:   02/22/21 120/78   08/18/20 130/68       Wt Readings from Last 3 Encounters:   02/22/21 242 lb 3.2 oz (109.9 kg)   12/22/20 241 lb (109.3 kg)   08/18/20 241 lb 3.2 oz (109.4 kg)       Physical Exam  Constitutional:       Appearance: He is well-developed. HENT:      Head: Normocephalic and atraumatic. Eyes:      Conjunctiva/sclera: Conjunctivae normal.   Neck:      Trachea: No tracheal deviation. Cardiovascular:      Rate and Rhythm: Normal rate and regular rhythm. Heart sounds: No murmur. No friction rub. No gallop. Pulmonary:      Effort: Pulmonary effort is normal.      Breath sounds: Normal breath sounds. Abdominal:      Palpations: Abdomen is soft. Tenderness: There is no abdominal tenderness. Musculoskeletal:      Right lower leg: No edema. Left lower leg: No edema. Skin:     General: Skin is warm and dry. Neurological:      Mental Status: He is alert and oriented to person, place, and time. Psychiatric:         Mood and Affect: Mood normal.         Behavior: Behavior normal.           ASSESSMENT/PLAN:    1. Chronic obstructive pulmonary disease, unspecified COPD type ([address]  Symptoms are near baseline. Tolerating medications well. No changes at this time    2. [facility]state cancer [facility])  Considering urology follow-up. Last PSA appropriately suppressed. Plans on repeating those labs annually at his next office visit in approximately 6 months    3. Coronary artery disease involving native coronary artery of native heart without angina pectoris  Recommend cardiology follow-up. No new symptoms    4. Mixed hyperlipidemia  Repeat labs with next draw in approximately 6 months    5. History of colon polyps  Last colonoscopy in 2017. At that time, the recommendation was to repeat in 5 years. He also has a history of celiac disease. Since that time, his younger brother has been diagnosed with colon cancer.   I did recommend they call GI to discuss if the timing of his repeat colonoscopy should be moved up.

## 2021-05-04 ENCOUNTER — PATIENT MESSAGE (OUTPATIENT)
Dept: FAMILY MEDICINE CLINIC | Age: 76
End: 2021-05-04

## 2021-05-04 NOTE — TELEPHONE ENCOUNTER
From: Leandra Leon  To: Jeremy Billings MD  Sent: 5/4/2021 7:39 AM EDT  Subject: Non-Urgent Medical Question    Tera got up this morning complaining about his \"head swimming, nausea, vomiting, and dizziness. He also says he \"feels funny\" but can't really describe how /75 heart rate 52 temp is 97. I did pull 2 ticks off him on Saturday that had burrowed in pretty good. Every time he moves the dry heaves start.  Does he need to see Dr. Patricia Rivers or ??????

## 2021-05-05 ENCOUNTER — TELEPHONE (OUTPATIENT)
Dept: FAMILY MEDICINE CLINIC | Age: 76
End: 2021-05-05

## 2021-05-05 RX ORDER — CEFDINIR 300 MG/1
300 CAPSULE ORAL 2 TIMES DAILY
Qty: 20 CAPSULE | Refills: 0 | Status: CANCELLED | OUTPATIENT
Start: 2021-05-05 | End: 2021-05-15

## 2021-05-05 RX ORDER — CEFDINIR 300 MG/1
300 CAPSULE ORAL 2 TIMES DAILY
Qty: 20 CAPSULE | Refills: 0 | Status: SHIPPED | OUTPATIENT
Start: 2021-05-05 | End: 2021-05-15

## 2021-05-05 NOTE — TELEPHONE ENCOUNTER
Patient's wife states while patient was in ER yesterday Dr. Kassidy Cobb had given him a one time dose of Doxycycline. She state [facility] the week before. After he took the doxy he vomited about an hour later so she isn't sure how much of the antibiotic that he actually got.

## 2021-05-10 ENCOUNTER — OFFICE VISIT (OUTPATIENT)
Dept: FAMILY MEDICINE CLINIC | Age: 76
End: 2021-05-10
Payer: MEDICARE

## 2021-05-10 VITALS
SYSTOLIC BLOOD PRESSURE: 136 MMHG | OXYGEN SATURATION: 97 % | WEIGHT: 240 LBS | HEIGHT: 69 IN | BODY MASS INDEX: 35.55 KG/M2 | HEART RATE: 64 BPM | DIASTOLIC BLOOD PRESSURE: 80 MMHG

## 2021-05-10 DIAGNOSIS — R42 VERTIGO: Primary | ICD-10-CM
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
## 2021-05-10 PROCEDURE — 1036F TOBACCO NON-USER: CPT | Performed by: FAMILY MEDICINE

## 2021-05-10 PROCEDURE — G8427 DOCREV CUR MEDS BY ELIG CLIN: HCPCS | Performed by: FAMILY MEDICINE

## 2021-05-10 PROCEDURE — 4040F PNEUMOC VAC/ADMIN/RCVD: CPT | Performed by: FAMILY MEDICINE

## 2021-05-10 PROCEDURE — 1123F ACP DISCUSS/DSCN MKR DOCD: CPT | Performed by: FAMILY MEDICINE

## 2021-05-10 PROCEDURE — 3017F COLORECTAL CA SCREEN DOC REV: CPT | Performed by: FAMILY MEDICINE

## 2021-05-10 PROCEDURE — G8417 CALC BMI ABV UP PARAM F/U: HCPCS | Performed by: FAMILY MEDICINE

## 2021-05-10 PROCEDURE — 99213 OFFICE O/P EST LOW 20 MIN: CPT | Performed by: FAMILY MEDICINE

## 2021-05-10 RX ORDER — ONDANSETRON 4 MG/1
TABLET, FILM COATED ORAL
COMMUNITY
Start: 2021-05-04 | End: 2021-08-18

## 2021-05-10 RX ORDER — POLYETHYLENE GLYCOL 3350 17 G/17G
17 POWDER, FOR SOLUTION ORAL DAILY PRN
COMMUNITY

## 2021-05-10 RX ORDER — MECLIZINE HYDROCHLORIDE 25 MG/1
TABLET ORAL
COMMUNITY
Start: 2021-05-04 | End: 2021-08-18

## 2021-05-10 ASSESSMENT — ENCOUNTER SYMPTOMS: NAUSEA: 1

## 2021-05-10 NOTE — PROGRESS NOTES
Chief Complaint   Patient presents with    Follow-Up from Hospital     patient was seen at [facility] ER on 21 for vertigo. HPI:  Angely Deutsch is a 77 y.o. (: 1945) here today   for follow up from ED. Patient was seen at [facility] ER on 21 for vertigo. HPI  Sudden onset of spinning sensation. Had sig dry heaves w/ any movement. Was staggering w/ walking. Had sxs off and on for approx 2 days. Was doing exercises. Overall better. Was given nausea meds. Still some sxs if turns quickly. Overall much better. Was given doxy initially due to tick bites. Emesis. Then w/ omnicef. Had mowed thru the woods. Patient's medications, allergies, past medical, surgical, social and family histories were reviewed and updated as appropriate. ROS:  Review of Systems   Constitutional: Negative for fever. Gastrointestinal: Positive for nausea. Neurological: Positive for dizziness.            LDL Calculated (mg/dL)   Date Value   2020 77       Past Medical History:   Diagnosis Date    Asthma     CAD (coronary artery disease)     Celiac sprue     COPD (chronic obstructive pulmonary disease) (HCC)     DDD (degenerative disc disease), lumbar     Fatty liver     HTN (hypertension)     Hyperlipidemia     Paget's disease     Prostate cancer ([facility] [address] 2009       Family History   Problem Relation Age of Onset    Other Mother         macular degeneration, DVT     Colon Cancer Father     Other Brother         celiac    High Blood Pressure Brother     Diabetes Brother     Heart Attack Maternal Grandmother     Heart Attack Maternal Grandfather     Stroke Maternal Grandfather     Heart Attack Paternal Grandfather     Other Paternal Uncle         stomach cancer       Social History     Socioeconomic History    Marital status:      Spouse name: Not on file    Number of children: Not on file    Years of education: Not on file    Highest education level: Not on file Occupational History    Occupation: [employer]    Financial resource strain: Not hard at all   [employer] insecurity     Worry: Never true     Inability: Never true   [employer] needs     Medical: No     Non-medical: No   Tobacco Use    Smoking status: Former Smoker     Packs/day: 0.10     Years: 4.00     Pack years: 0.40     Types: Cigars     Start date: 1964     Quit date: 1970     Years since quittin.3    Smokeless tobacco: Never Used   Substance and Sexual Activity    Alcohol use: No    Drug use: No    Sexual activity: Yes   Lifestyle    Physical activity     Days per week: Not on file     Minutes per session: Not on file    Stress: Not on file   Relationships    Social connections     Talks on phone: Not on file     Gets together: Not on file     Attends Catholic service: Not on file     Active member of club or organization: Not on file     Attends meetings of clubs or organizations: Not on file     Relationship status: Not on file    Intimate partner violence     Fear of current or ex partner: Not on file     Emotionally abused: Not on file     Physically abused: Not on file     Forced sexual activity: Not on file   Other Topics Concern    Not on file   Social History Narrative    Not on file       Prior to Visit Medications    Medication Sig Taking?  Authorizing Provider   polyethylene glycol (GLYCOLAX) 17 g packet Take 17 g by mouth daily as needed for Constipation Yes Historical Provider, MD   meclizine (ANTIVERT) 25 MG tablet TAKE ONE TABLET BY MOUTH EVERY 8 HOURS AS NEEDED Yes Historical Provider, MD   ondansetron (ZOFRAN) 4 MG tablet TAKE ONE TABLET BY MOUTH EVERY 6 HOURS AS NEEDED FOR NAUSEA Yes Historical Provider, MD   cefdinir (OMNICEF) 300 MG capsule Take 1 capsule by mouth 2 times daily for 10 days Yes Raul Gillis MD   B Complex-C (SUPER B COMPLEX PO) Take by mouth daily Yes Historical Provider, MD   ZINC PO Take by mouth daily Yes Historical Provider, MD Cholecalciferol (VITAMIN D3) 125 MCG (5000 UT) TABS Take by mouth daily Yes Historical Provider, MD   Ascorbic Acid (VITAMIN C) 500 MG CAPS Take by mouth daily Yes Historical Provider, MD   Omega 3-6-9 Fatty Acids (OMEGA-3-6-9 PO) Take by mouth daily Yes Historical Provider, MD   atorvastatin (LIPITOR) 20 MG tablet TAKE ONE TABLET BY MOUTH EVERY DAY Yes DONTA Mendez CNP   BREO ELLIPTA 200-25 MCG/INH AEPB inhaler INHALE ONE PUFF BY MOUTH DAILY Yes DONTA Mendez CNP   omeprazole (PRILOSEC) 20 MG delayed release capsule Take 1 capsule by mouth Daily Yes DONTA Mendez CNP   albuterol sulfate HFA (PROVENTIL HFA) 108 (90 Base) MCG/ACT inhaler Inhale 2 puffs into the lungs every 6 hours as needed for Wheezing Yes Jose Luis Buckner MD   Coenzyme Q10 (CO Q-10) 200 MG CAPS Take 200 mg by mouth daily Yes Historical Provider, MD   Melatonin 10 MG CAPS Take 10 mg by mouth nightly Yes Historical Provider, MD   Glucosamine-Chondroitin-Ca-D3 (TRIPLE FLEX 50+ PO) Take 2 tablets by mouth Daily. Yes Historical Provider, MD   Calcium-Magnesium-Vitamin D (CALCIUM MAGNESIUM PO) Take 1 tablet by mouth Daily. Dose is 1200/400  Yes Historical Provider, MD   acetaminophen (TYLENOL) 325 MG tablet Take 1,000 mg by mouth as needed. Yes Historical Provider, MD   therapeutic multivitamin-minerals (THERAGRAN-M) tablet Take 1 tablet by mouth daily.    Yes Historical Provider, MD   aspirin 81 MG chewable tablet Take 81 mg by mouth once a week  Yes Historical Provider, MD       Allergies   Allergen Reactions    Pcn [Penicillins] Swelling    Wheat Extract Other (See Comments)     Celiac disease       OBJECTIVE:    /80   Pulse 64   Ht 5' 8.5\" (1.74 m)   Wt 240 lb (108.9 kg)   SpO2 97%   BMI 35.96 kg/m²     BP Readings from Last 2 Encounters:   05/10/21 136/80   02/22/21 120/78       Wt Readings from Last 3 Encounters:   05/10/21 240 lb (108.9 kg)   02/22/21 242 lb 3.2 oz (109.9 kg)   12/22/20 241 lb (109.3 kg) Physical Exam  Constitutional:       Appearance: He is well-developed. HENT:      Head: Normocephalic and atraumatic. Right Ear: Tympanic membrane normal.      Left Ear: Tympanic membrane normal.   Eyes:      Conjunctiva/sclera: Conjunctivae normal.   Neck:      Trachea: No tracheal deviation. Cardiovascular:      Rate and Rhythm: Normal rate and regular rhythm. Heart sounds: No murmur. No friction rub. No gallop. Pulmonary:      Effort: Pulmonary effort is normal.      Breath sounds: Normal breath sounds. Abdominal:      Palpations: Abdomen is soft. Tenderness: There is no abdominal tenderness. Musculoskeletal:      Right lower leg: No edema. Left lower leg: No edema. Skin:     General: Skin is warm and dry. Neurological:      Mental Status: He is alert and oriented to person, place, and time. Psychiatric:         Mood and Affect: Mood normal.         Behavior: Behavior normal.           ASSESSMENT/PLAN:    1. Vertigo  Overall improved. Cont nausea med prn. Cont exercises. Call if recurrent sxs      2. Tick bite, sequela  Small area noted. Finish omnicef. Discussed probiotic. F/u this summer and rpt labs at that time as planned.

## 2021-08-10 ENCOUNTER — NURSE ONLY (OUTPATIENT)
Dept: FAMILY MEDICINE CLINIC | Age: 76
End: 2021-08-10
Payer: MEDICARE

## 2021-08-10 DIAGNOSIS — K90.0 ADULT CELIAC DISEASE: ICD-10-CM

## 2021-08-10 DIAGNOSIS — C61 PROSTATE CANCER (HCC): ICD-10-CM

## 2021-08-10 DIAGNOSIS — E78.2 MIXED HYPERLIPIDEMIA: ICD-10-CM

## 2021-08-10 LAB
A/G RATIO: 1.7 (ref 1.1–2.2)
ALBUMIN SERPL-MCNC: 4.5 G/DL (ref 3.4–5)
ALP BLD-CCNC: 104 U/L (ref 40–129)
ALT SERPL-CCNC: 19 U/L (ref 10–40)
ANION GAP SERPL CALCULATED.3IONS-SCNC: 13 MMOL/L (ref 3–16)
AST SERPL-CCNC: 27 U/L (ref 15–37)
BASOPHILS ABSOLUTE: 0 K/UL (ref 0–0.2)
BASOPHILS RELATIVE PERCENT: 1 %
BILIRUB SERPL-MCNC: 0.4 MG/DL (ref 0–1)
BUN BLDV-MCNC: 21 MG/DL (ref 7–20)
CALCIUM SERPL-MCNC: 9.1 MG/DL (ref 8.3–10.6)
CHLORIDE BLD-SCNC: 103 MMOL/L (ref 99–110)
CHOLESTEROL, TOTAL: 134 MG/DL (ref 0–199)
CO2: 21 MMOL/L (ref 21–32)
CREAT SERPL-MCNC: 0.7 MG/DL (ref 0.8–1.3)
EOSINOPHILS ABSOLUTE: 0.1 K/UL (ref 0–0.6)
EOSINOPHILS RELATIVE PERCENT: 2 %
GFR AFRICAN AMERICAN: >60
GFR NON-AFRICAN AMERICAN: >60
GLOBULIN: 2.6 G/DL
GLUCOSE BLD-MCNC: 92 MG/DL (ref 70–99)
HCT VFR BLD CALC: 39.9 % (ref 40.5–52.5)
HDLC SERPL-MCNC: 56 MG/DL (ref 40–60)
HEMOGLOBIN: 13.9 G/DL (ref 13.5–17.5)
LDL CHOLESTEROL CALCULATED: 69 MG/DL
LYMPHOCYTES ABSOLUTE: 0.9 K/UL (ref 1–5.1)
LYMPHOCYTES RELATIVE PERCENT: 24.2 %
MCH RBC QN AUTO: 34.1 PG (ref 26–34)
MCHC RBC AUTO-ENTMCNC: 34.9 G/DL (ref 31–36)
MCV RBC AUTO: 97.7 FL (ref 80–100)
MONOCYTES ABSOLUTE: 0.3 K/UL (ref 0–1.3)
MONOCYTES RELATIVE PERCENT: 9.2 %
NEUTROPHILS ABSOLUTE: 2.4 K/UL (ref 1.7–7.7)
NEUTROPHILS RELATIVE PERCENT: 63.6 %
PDW BLD-RTO: 13 % (ref 12.4–15.4)
PLATELET # BLD: 184 K/UL (ref 135–450)
PMV BLD AUTO: 10 FL (ref 5–10.5)
POTASSIUM SERPL-SCNC: 4.4 MMOL/L (ref 3.5–5.1)
PROSTATE SPECIFIC ANTIGEN: <0.01 NG/ML (ref 0–4)
RBC # BLD: 4.08 M/UL (ref 4.2–5.9)
SODIUM BLD-SCNC: 137 MMOL/L (ref 136–145)
TOTAL PROTEIN: 7.1 G/DL (ref 6.4–8.2)
TRIGL SERPL-MCNC: 46 MG/DL (ref 0–150)
VLDLC SERPL CALC-MCNC: 9 MG/DL
WBC # BLD: 3.7 K/UL (ref 4–11)

## 2021-08-10 PROCEDURE — 36415 COLL VENOUS BLD VENIPUNCTURE: CPT | Performed by: FAMILY MEDICINE

## 2021-08-10 NOTE — PROGRESS NOTES
Blood drawn per order. Needle size: 23 g  Site: L Antecubital. Right hand  First attempt successful No    Second attempt no, 3rd successfule    Pressure applied until bleeding stopped. Pressure applied. Patient informed to call office or return if bleeding reoccurs and unable to stop.     Tubes drawn: 1 purple     1 red

## 2021-08-18 ENCOUNTER — OFFICE VISIT (OUTPATIENT)
Dept: FAMILY MEDICINE CLINIC | Age: 76
End: 2021-08-18
Payer: MEDICARE

## 2021-08-18 VITALS
SYSTOLIC BLOOD PRESSURE: 120 MMHG | DIASTOLIC BLOOD PRESSURE: 72 MMHG | BODY MASS INDEX: 35.01 KG/M2 | HEIGHT: 69 IN | WEIGHT: 236.4 LBS | HEART RATE: 50 BPM | OXYGEN SATURATION: 98 %

## 2021-08-18 DIAGNOSIS — I25.10 CORONARY ARTERY DISEASE INVOLVING NATIVE CORONARY ARTERY OF NATIVE HEART WITHOUT ANGINA PECTORIS: ICD-10-CM

## 2021-08-18 DIAGNOSIS — E66.01 SEVERE OBESITY (BMI 35.0-35.9 WITH COMORBIDITY) (HCC): ICD-10-CM

## 2021-08-18 DIAGNOSIS — K21.9 GASTROESOPHAGEAL REFLUX DISEASE WITHOUT ESOPHAGITIS: ICD-10-CM

## 2021-08-18 DIAGNOSIS — E78.2 MIXED HYPERLIPIDEMIA: ICD-10-CM

## 2021-08-18 DIAGNOSIS — M51.36 DDD (DEGENERATIVE DISC DISEASE), LUMBAR: ICD-10-CM

## 2021-08-18 DIAGNOSIS — M88.9 PAGET'S BONE DISEASE: ICD-10-CM

## 2021-08-18 DIAGNOSIS — J44.9 CHRONIC OBSTRUCTIVE PULMONARY DISEASE, UNSPECIFIED COPD TYPE (HCC): Primary | ICD-10-CM

## 2021-08-18 DIAGNOSIS — Z85.46 PERSONAL HISTORY OF PROSTATE CANCER: ICD-10-CM

## 2021-08-18 PROCEDURE — 4040F PNEUMOC VAC/ADMIN/RCVD: CPT | Performed by: FAMILY MEDICINE

## 2021-08-18 PROCEDURE — G8427 DOCREV CUR MEDS BY ELIG CLIN: HCPCS | Performed by: FAMILY MEDICINE

## 2021-08-18 PROCEDURE — 3017F COLORECTAL CA SCREEN DOC REV: CPT | Performed by: FAMILY MEDICINE

## 2021-08-18 PROCEDURE — 3023F SPIROM DOC REV: CPT | Performed by: FAMILY MEDICINE

## 2021-08-18 PROCEDURE — 1123F ACP DISCUSS/DSCN MKR DOCD: CPT | Performed by: FAMILY MEDICINE

## 2021-08-18 PROCEDURE — 99214 OFFICE O/P EST MOD 30 MIN: CPT | Performed by: FAMILY MEDICINE

## 2021-08-18 PROCEDURE — G8926 SPIRO NO PERF OR DOC: HCPCS | Performed by: FAMILY MEDICINE

## 2021-08-18 PROCEDURE — G8417 CALC BMI ABV UP PARAM F/U: HCPCS | Performed by: FAMILY MEDICINE

## 2021-08-18 PROCEDURE — 1036F TOBACCO NON-USER: CPT | Performed by: FAMILY MEDICINE

## 2021-08-18 RX ORDER — OMEPRAZOLE 20 MG/1
20 CAPSULE, DELAYED RELEASE ORAL DAILY
Qty: 90 CAPSULE | Refills: 3 | Status: SHIPPED | OUTPATIENT
Start: 2021-08-18 | End: 2022-08-15 | Stop reason: SDUPTHER

## 2021-08-18 RX ORDER — ALBUTEROL SULFATE 90 UG/1
2 AEROSOL, METERED RESPIRATORY (INHALATION) EVERY 6 HOURS PRN
Qty: 1 INHALER | Refills: 3 | Status: SHIPPED | OUTPATIENT
Start: 2021-08-18 | End: 2022-08-15 | Stop reason: SDUPTHER

## 2021-08-18 ASSESSMENT — ENCOUNTER SYMPTOMS
BACK PAIN: 1
SHORTNESS OF BREATH: 0

## 2021-08-18 NOTE — PROGRESS NOTES
Chief Complaint   Patient presents with    Hyperlipidemia       HPI:  Dimas Casillas is a 77 y.o. (: 1945) here today   for   Hyperlipidemia  This is a chronic problem. The current episode started more than 1 year ago. Pertinent negatives include no chest pain or shortness of breath. Current antihyperlipidemic treatment includes statins. There are no compliance problems. Risk factors for coronary artery disease include dyslipidemia and male sex. Hx of prostate cancer. Labs have been ok. Plans on urology f/u appt. Prior hx of chest pain. Ongoing back pain. Hx of paget's. Will have some pain / numbness in the back of the legs. Not severe pain. Pain to medial thighs at times during the day. Wearing back brace when active. Copd. Doing well w/ breo. Rarely using albuterol. Patient's medications, allergies, past medical, surgical, social and family histories were reviewed and updated as appropriate. ROS:  Review of Systems   Constitutional: Negative for fever. Respiratory: Negative for shortness of breath. Cardiovascular: Negative for chest pain. Musculoskeletal: Positive for back pain.            LDL Calculated (mg/dL)   Date Value   08/10/2021 69       Past Medical History:   Diagnosis Date    Asthma     CAD (coronary artery disease)     Celiac sprue     COPD (chronic obstructive pulmonary disease) (HCC)     DDD (degenerative disc disease), lumbar     Fatty liver     HTN (hypertension)     Hyperlipidemia     Paget's disease     [facility]       Family History   Problem Relation Age of Onset    Other Mother         macular degeneration, DVT     Colon Cancer Father     Other Brother         celiac    High Blood Pressure Brother     Diabetes Brother     Heart Attack Maternal Grandmother     Heart Attack Maternal Grandfather     Stroke Maternal Grandfather     Heart Attack Paternal Grandfather     Other Paternal Uncle         stomach cancer       Social History     Socioeconomic History    Marital status:      Spouse name: Not on file    Number of children: Not on file    Years of education: Not on file    Highest education level: Not on file   Occupational History    Occupation: [employer]   Tobacco Use    Smoking status: Former Smoker     Packs/day: 0.10     Years: 4.00     Pack years: 0.40     Types: Cigars     Start date: 1964     Quit date: 1970     Years since quittin.6    Smokeless tobacco: Never Used   Substance and Sexual Activity    Alcohol use: No    Drug use: No    Sexual activity: Yes   Other Topics Concern    Not on file   Social History Narrative    Not on file     Social Determinants of Health     Financial Resource Strain: Low Risk     Difficulty of Paying Living Expenses: Not hard at all   Food Insecurity: No Food Insecurity    Worried About [email or web address] in the Last Year: Never true    Daniele of Food in the Last Year: Never true   Transportation Needs: No Transportation Needs    Lack of Transportation (Medical): No    Lack of Transportation (Non-Medical): No   Physical Activity:     Days of Exercise per Week:     Minutes of Exercise per Session:    Stress:     Feeling of Stress :    Social Connections:     Frequency of Communication with Friends and Family:     Frequency of Social Gatherings with Friends and Family:     Attends Baptism Services:     Active Member of Clubs or Organizations:     Attends Club or Organization Meetings:     Marital Status:    Intimate Partner Violence:     Fear of Current or Ex-Partner:     Emotionally Abused:     Physically Abused:     Sexually Abused:        Prior to Visit Medications    Medication Sig Taking?  Authorizing Provider   Fluticasone furoate-vilanterol (BREO ELLIPTA) 200-25 MCG/INH AEPB inhaler INHALE ONE PUFF BY MOUTH DAILY Yes Bethany Gibbs MD   albuterol sulfate HFA (PROVENTIL HFA) 108 (90 Base) MCG/ACT inhaler Inhale 2 puffs into the lungs every 6 hours as needed for Wheezing Yes Leonela Bailey MD   omeprazole (PRILOSEC) 20 MG delayed release capsule Take 1 capsule by mouth Daily Yes Leonela Bailey MD   polyethylene glycol (GLYCOLAX) 17 g packet Take 17 g by mouth daily as needed for Constipation Yes Historical Provider, MD   B Complex-C (SUPER B COMPLEX PO) Take by mouth daily Yes Historical Provider, MD   Cholecalciferol (VITAMIN D3) 125 MCG (5000 UT) TABS Take by mouth daily Yes Historical Provider, MD   Omega 3-6-9 Fatty Acids (OMEGA-3-6-9 PO) Take by mouth daily Yes Historical Provider, MD   atorvastatin (LIPITOR) 20 MG tablet TAKE ONE TABLET BY MOUTH EVERY DAY Yes DONTA Espino - CNP   Coenzyme Q10 (CO Q-10) 200 MG CAPS Take 200 mg by mouth daily Yes Historical Provider, MD   Melatonin 10 MG CAPS Take 10 mg by mouth nightly Yes Historical Provider, MD   Glucosamine-Chondroitin-Ca-D3 (TRIPLE FLEX 50+ PO) Take 2 tablets by mouth Daily. Yes Historical Provider, MD   Calcium-Magnesium-Vitamin D (CALCIUM MAGNESIUM PO) Take 1 tablet by mouth Daily. Dose is 1200/400  Yes Historical Provider, MD   acetaminophen (TYLENOL) 325 MG tablet Take 1,000 mg by mouth as needed. Yes Historical Provider, MD   therapeutic multivitamin-minerals (THERAGRAN-M) tablet Take 1 tablet by mouth daily.    Yes Historical Provider, MD   aspirin 81 MG chewable tablet Take 81 mg by mouth once a week  Yes Historical Provider, MD       Allergies   Allergen Reactions    Pcn [Penicillins] Swelling    Wheat Extract Other (See Comments)     Celiac disease       OBJECTIVE:    /72   Pulse 50   Ht 5' 8.5\" (1.74 m)   Wt 236 lb 6.4 oz (107.2 kg)   SpO2 98%   BMI 35.42 kg/m²     BP Readings from Last 2 Encounters:   08/18/21 120/72   05/10/21 136/80       Wt Readings from Last 3 Encounters:   08/18/21 236 lb 6.4 oz (107.2 kg)   05/10/21 240 lb (108.9 kg)   02/22/21 242 lb 3.2 oz (109.9 kg)       Physical Exam  Constitutional:       Appearance: He is well-developed. HENT:      Head: Normocephalic and atraumatic. Eyes:      Conjunctiva/sclera: Conjunctivae normal.   Neck:      Trachea: No tracheal deviation. Cardiovascular:      Rate and Rhythm: Normal rate and regular rhythm. Heart sounds: No murmur heard. No friction rub. No gallop. Pulmonary:      Effort: Pulmonary effort is normal.      Breath sounds: Normal breath sounds. Abdominal:      Palpations: Abdomen is soft. Tenderness: There is no abdominal tenderness. Musculoskeletal:      Right lower leg: No edema. Left lower leg: No edema. Skin:     General: Skin is warm and dry. Neurological:      General: No focal deficit present. Mental Status: He is alert and oriented to person, place, and time. Psychiatric:         Mood and Affect: Mood normal.         Behavior: Behavior normal.           ASSESSMENT/PLAN:    1. Chronic obstructive pulmonary disease, unspecified COPD type ([address]  The current medical regimen is effective;  continue present plan and medications. - Fluticasone furoate-vilanterol (BREO ELLIPTA) 200-25 MCG/INH AEPB inhaler; INHALE ONE PUFF BY MOUTH DAILY  Dispense: 3 each; Refill: 3  - albuterol sulfate HFA (PROVENTIL HFA) 108 (90 Base) MCG/ACT inhaler; Inhale 2 puffs into the lungs every 6 hours as needed for Wheezing  Dispense: 1 Inhaler; Refill: 3    2. Mixed hyperlipidemia  The current medical regimen is effective;  continue present plan and medications. 3. DDD (degenerative disc disease), lumbar  Ongoing back issues. Near baseline. Given exercises. Consider imaging if worsening sxs. 4. Personal history of prostate cancer  psa ok. Provided copies to     5. Coronary artery disease involving native coronary artery of native heart without angina pectoris  No new issues. 6. Gastroesophageal reflux disease without esophagitis  Refill meds. - omeprazole (PRILOSEC) 20 MG delayed release capsule;  Take 1 capsule by mouth Daily  Dispense: 90 capsule; Refill: 3    7. Paget's bone disease  See above. 8. Severe obesity (BMI 35.0-35.9 with comorbidity) ([address]  Has lost a little weight. Cont to work on diet and activity.

## 2021-08-18 NOTE — PATIENT INSTRUCTIONS
Patient Education        Low Back Pain: Exercises  Introduction  Here are some examples of exercises for you to try. The exercises may be suggested for a condition or for rehabilitation. Start each exercise slowly. Ease off the exercises if you start to have pain. You will be told when to start these exercises and which ones will work best for you. How to do the exercises  Press-up   1. Lie on your stomach, supporting your body with your forearms. 2. Press your elbows down into the floor to raise your upper back. As you do this, relax your stomach muscles and allow your back to arch without using your back muscles. As your press up, do not let your hips or pelvis come off the floor. 3. Hold for 15 to 30 seconds, then relax. 4. Repeat 2 to 4 times. Alternate arm and leg (bird dog) exercise   Do this exercise slowly. Try to keep your body straight at all times, and do not let one hip drop lower than the other. 1. Start on the floor, on your hands and knees. 2. Tighten your belly muscles. 3. Raise one leg off the floor, and hold it straight out behind you. Be careful not to let your hip drop down, because that will twist your trunk. 4. Hold for about 6 seconds, then lower your leg and switch to the other leg. 5. Repeat 8 to 12 times on each leg. 6. Over time, work up to holding for 10 to 30 seconds each time. 7. If you feel stable and secure with your leg raised, try raising the opposite arm straight out in front of you at the same time. Knee-to-chest exercise   1. Lie on your back with your knees bent and your feet flat on the floor. 2. Bring one knee to your chest, keeping the other foot flat on the floor (or keeping the other leg straight, whichever feels better on your lower back). 3. Keep your lower back pressed to the floor. Hold for at least 15 to 30 seconds. 4. Relax, and lower the knee to the starting position. 5. Repeat with the other leg. Repeat 2 to 4 times with each leg.   6. To get more stretch, put your other leg flat on the floor while pulling your knee to your chest.    Curl-ups   1. Lie on the floor on your back with your knees bent at a 90-degree angle. Your feet should be flat on the floor, about 12 inches from your buttocks. 2. Cross your arms over your chest. If this bothers your neck, try putting your hands behind your neck (not your head), with your elbows spread apart. 3. Slowly tighten your belly muscles and raise your shoulder blades off the floor. 4. Keep your head in line with your body, and do not press your chin to your chest.  5. Hold this position for 1 or 2 seconds, then slowly lower yourself back down to the floor. 6. Repeat 8 to 12 times. Pelvic tilt exercise   1. Lie on your back with your knees bent. 2. \"Brace\" your stomach. This means to tighten your muscles by pulling in and imagining your belly button moving toward your spine. You should feel like your back is pressing to the floor and your hips and pelvis are rocking back. 3. Hold for about 6 seconds while you breathe smoothly. 4. Repeat 8 to 12 times. Heel dig bridging   1. Lie on your back with both knees bent and your ankles bent so that only your heels are digging into the floor. Your knees should be bent about 90 degrees. 2. Then push your heels into the floor, squeeze your buttocks, and lift your hips off the floor until your shoulders, hips, and knees are all in a straight line. 3. Hold for about 6 seconds as you continue to breathe normally, and then slowly lower your hips back down to the floor and rest for up to 10 seconds. 4. Do 8 to 12 repetitions. Hamstring stretch in doorway   1. Lie on your back in a doorway, with one leg through the open door. 2. Slide your leg up the wall to straighten your knee. You should feel a gentle stretch down the back of your leg. 3. Hold the stretch for at least 15 to 30 seconds. Do not arch your back, point your toes, or bend either knee.  Keep one heel touching the floor and the other heel touching the wall. 4. Repeat with your other leg. 5. Do 2 to 4 times for each leg. Hip flexor stretch   1. Kneel on the floor with one knee bent and one leg behind you. Place your forward knee over your foot. Keep your other knee touching the floor. 2. Slowly push your hips forward until you feel a stretch in the upper thigh of your rear leg. 3. Hold the stretch for at least 15 to 30 seconds. Repeat with your other leg. 4. Do 2 to 4 times on each side. Wall sit   1. Stand with your back 10 to 12 inches away from a wall. 2. Lean into the wall until your back is flat against it. 3. Slowly slide down until your knees are slightly bent, pressing your lower back into the wall. 4. Hold for about 6 seconds, then slide back up the wall. 5. Repeat 8 to 12 times. Follow-up care is a key part of your treatment and safety. Be sure to make and go to all appointments, and call your doctor if you are having problems. It's also a good idea to know your test results and keep a list of the medicines you take. Where can you learn more? Go to [email or web address] and sign in to your [employer] account. Enter S351 in the [facility] box to learn more about \"Low Back Pain: Exercises. \"     If you do not have an account, please click on the \"Sign Up Now\" link. Current as of: November 16, 2020               Content Version: 12.9  © 2006-2021 [employer]. Care instructions adapted under license by [facility] [facility] If you have questions about a medical condition or this instruction, always ask your healthcare professional. [place] [address] any warranty or liability for your use of this information.

## 2022-02-15 ENCOUNTER — OFFICE VISIT (OUTPATIENT)
Dept: FAMILY MEDICINE CLINIC | Age: 77
End: 2022-02-15
Payer: MEDICARE

## 2022-02-15 VITALS
DIASTOLIC BLOOD PRESSURE: 56 MMHG | HEART RATE: 56 BPM | BODY MASS INDEX: 35.25 KG/M2 | SYSTOLIC BLOOD PRESSURE: 92 MMHG | OXYGEN SATURATION: 98 % | HEIGHT: 69 IN | WEIGHT: 238 LBS

## 2022-02-15 DIAGNOSIS — C61 PROSTATE CANCER (HCC): ICD-10-CM

## 2022-02-15 DIAGNOSIS — K90.0 ADULT CELIAC DISEASE: ICD-10-CM

## 2022-02-15 DIAGNOSIS — E66.01 SEVERE OBESITY (BMI 35.0-39.9) WITH COMORBIDITY (HCC): ICD-10-CM

## 2022-02-15 DIAGNOSIS — I25.10 CORONARY ARTERY DISEASE INVOLVING NATIVE CORONARY ARTERY OF NATIVE HEART WITHOUT ANGINA PECTORIS: ICD-10-CM

## 2022-02-15 DIAGNOSIS — E78.2 MIXED HYPERLIPIDEMIA: ICD-10-CM

## 2022-02-15 DIAGNOSIS — J44.9 CHRONIC OBSTRUCTIVE PULMONARY DISEASE, UNSPECIFIED COPD TYPE (HCC): Primary | ICD-10-CM

## 2022-02-15 DIAGNOSIS — Z80.0 FAMILY HISTORY OF COLON CANCER: ICD-10-CM

## 2022-02-15 PROCEDURE — 4040F PNEUMOC VAC/ADMIN/RCVD: CPT | Performed by: FAMILY MEDICINE

## 2022-02-15 PROCEDURE — 1123F ACP DISCUSS/DSCN MKR DOCD: CPT | Performed by: FAMILY MEDICINE

## 2022-02-15 PROCEDURE — 99214 OFFICE O/P EST MOD 30 MIN: CPT | Performed by: FAMILY MEDICINE

## 2022-02-15 PROCEDURE — G8427 DOCREV CUR MEDS BY ELIG CLIN: HCPCS | Performed by: FAMILY MEDICINE

## 2022-02-15 PROCEDURE — G8484 FLU IMMUNIZE NO ADMIN: HCPCS | Performed by: FAMILY MEDICINE

## 2022-02-15 PROCEDURE — 1036F TOBACCO NON-USER: CPT | Performed by: FAMILY MEDICINE

## 2022-02-15 PROCEDURE — G8417 CALC BMI ABV UP PARAM F/U: HCPCS | Performed by: FAMILY MEDICINE

## 2022-02-15 PROCEDURE — 3023F SPIROM DOC REV: CPT | Performed by: FAMILY MEDICINE

## 2022-02-15 RX ORDER — ATORVASTATIN CALCIUM 20 MG/1
TABLET, FILM COATED ORAL
Qty: 90 TABLET | Refills: 1 | Status: SHIPPED | OUTPATIENT
Start: 2022-02-15 | End: 2022-08-15 | Stop reason: SDUPTHER

## 2022-02-15 ASSESSMENT — ENCOUNTER SYMPTOMS
SHORTNESS OF BREATH: 0
CONSTIPATION: 0
DIARRHEA: 0

## 2022-02-15 NOTE — PROGRESS NOTES
Chief Complaint   Patient presents with    Hyperlipidemia       HPI:  Мария Perez is a 69 y.o. (: 1945) here today   for   Hyperlipidemia  This is a chronic problem. The current episode started more than 1 year ago. Pertinent negatives include no shortness of breath. Current antihyperlipidemic treatment includes statins. There are no compliance problems. Risk factors for coronary artery disease include dyslipidemia and male sex. Copd near baseline. Known hx of cad. No new sxs. Has had cardio f/u. Had been seeing Dr Filiberto Vance. He moved. Celiac well controlled. Gluten free diet. ge well. Sig family hx of colon cancer. Last cscope approx 5 yrs ago. No new urinary sxs. Last seen by urology in December. Plans on monitoring psa. Only f/u if psa goes up. Patient's medications, allergies, past medical, surgical, social and family histories were reviewed and updated as appropriate. ROS:  Review of Systems   Constitutional: Negative for fever. Respiratory: Negative for shortness of breath. Gastrointestinal: Negative for constipation and diarrhea.            LDL Calculated (mg/dL)   Date Value   08/10/2021 69       Past Medical History:   Diagnosis Date    Asthma     CAD (coronary artery disease)     Celiac sprue     COPD (chronic obstructive pulmonary disease) (HCC)     DDD (degenerative disc disease), lumbar     Fatty liver     HTN (hypertension)     Hyperlipidemia     Paget's disease     [facility]       Family History   Problem Relation Age of Onset    Other Mother         macular degeneration, DVT     Colon Cancer Father     Other Brother         celiac    High Blood Pressure Brother     Diabetes Brother     Heart Attack Maternal Grandmother     Heart Attack Maternal Grandfather     Stroke Maternal Grandfather     Heart Attack Paternal Grandfather     Other Paternal Uncle         stomach cancer       Social History     Socioeconomic History    Marital status:      Spouse name: Not on file    Number of children: Not on file    Years of education: Not on file    Highest education level: Not on file   Occupational History    Occupation: [employer]   Tobacco Use    Smoking status: Former Smoker     Packs/day: 0.10     Years: 4.00     Pack years: 0.40     Types: Cigars     Start date: 1964     Quit date: 1970     Years since quittin.1    Smokeless tobacco: Never Used   Substance and Sexual Activity    Alcohol use: No    Drug use: No    Sexual activity: Yes   Other Topics Concern    Not on file   Social History Narrative    Not on file     Social Determinants of Health     Financial Resource Strain: Low Risk     Difficulty of Paying Living Expenses: Not hard at all   Food Insecurity: No Food Insecurity    Worried About [employer] in the Last Year: Never true    Daniele of Food in the Last Year: Never true   Transportation Needs: No Transportation Needs    Lack of Transportation (Medical): No    Lack of Transportation (Non-Medical):  No   Physical Activity:     Days of Exercise per Week: Not on file    Minutes of Exercise per Session: Not on file   Stress:     Feeling of Stress : Not on file   Social Connections:     Frequency of Communication with Friends and Family: Not on file    Frequency of Social Gatherings with Friends and Family: Not on file    Attends Holiness Services: Not on file    Active Member of Clubs or Organizations: Not on file    Attends Club or Organization Meetings: Not on file    Marital Status: Not on file   Intimate Partner Violence:     Fear of Current or Ex-Partner: Not on file    Emotionally Abused: Not on file    Physically Abused: Not on file    Sexually Abused: Not on file   Housing Stability:     Unable to Pay for Housing in the Last Year: Not on file    Number of [place] in the Last Year: Not on file    Unstable Housing in the Last Year: Not on file Prior to Visit Medications    Medication Sig Taking? Authorizing Provider   Glucos-MSM-C-Ct-Jhilgc-Hidqoz (GLUCOSAMINE MSM COMPLEX PO) Take by mouth daily Yes Historical Provider, MD   Fluticasone furoate-vilanterol (BREO ELLIPTA) 200-25 MCG/INH AEPB inhaler INHALE ONE PUFF BY MOUTH DAILY Yes Arlyn Bach MD   albuterol sulfate HFA (PROVENTIL HFA) 108 (90 Base) MCG/ACT inhaler Inhale 2 puffs into the lungs every 6 hours as needed for Wheezing Yes Arlyn Bach MD   omeprazole (PRILOSEC) 20 MG delayed release capsule Take 1 capsule by mouth Daily Yes Alryn Bach MD   polyethylene glycol (GLYCOLAX) 17 g packet Take 17 g by mouth daily as needed for Constipation Yes Historical Provider, MD   B Complex-C (SUPER B COMPLEX PO) Take by mouth daily Yes Historical Provider, MD   Cholecalciferol (VITAMIN D3) 125 MCG (5000 UT) TABS Take by mouth daily Yes Historical Provider, MD   Omega 3-6-9 Fatty Acids (OMEGA-3-6-9 PO) Take by mouth daily Yes Historical Provider, MD   atorvastatin (LIPITOR) 20 MG tablet TAKE ONE TABLET BY MOUTH EVERY DAY Yes DONTA De - CNP   Coenzyme Q10 (CO Q-10) 200 MG CAPS Take 200 mg by mouth daily Yes Historical Provider, MD   Melatonin 10 MG CAPS Take 10 mg by mouth nightly Yes Historical Provider, MD   Calcium-Magnesium-Vitamin D (CALCIUM MAGNESIUM PO) Take 1 tablet by mouth Daily. Dose is 1200/400  Yes Historical Provider, MD   acetaminophen (TYLENOL) 325 MG tablet Take 1,000 mg by mouth as needed. Yes Historical Provider, MD   therapeutic multivitamin-minerals (THERAGRAN-M) tablet Take 1 tablet by mouth daily.    Yes Historical Provider, MD   aspirin 81 MG chewable tablet Take 81 mg by mouth once a week  Yes Historical Provider, MD       Allergies   Allergen Reactions    Pcn [Penicillins] Swelling    Wheat Extract Other (See Comments)     Celiac disease       OBJECTIVE:    BP (!) 92/56   Pulse 56   Ht 5' 8.5\" (1.74 m)   Wt 238 lb (108 kg)   SpO2 98%   BMI 35.66 kg/m²     BP Readings from Last 2 Encounters:   02/15/22 (!) 92/56   08/18/21 120/72       Wt Readings from Last 3 Encounters:   02/15/22 238 lb (108 kg)   08/18/21 236 lb 6.4 oz (107.2 kg)   05/10/21 240 lb (108.9 kg)       Physical Exam  Constitutional:       Appearance: He is well-developed. HENT:      Head: Normocephalic and atraumatic. Eyes:      Conjunctiva/sclera: Conjunctivae normal.   Neck:      Trachea: No tracheal deviation. Cardiovascular:      Rate and Rhythm: Normal rate and regular rhythm. Heart sounds: No murmur heard. No friction rub. No gallop. Pulmonary:      Effort: Pulmonary effort is normal.      Breath sounds: Normal breath sounds. Abdominal:      Palpations: Abdomen is soft. Tenderness: There is no abdominal tenderness. Musculoskeletal:      Right lower leg: No edema. Left lower leg: No edema. Skin:     General: Skin is warm and dry. Neurological:      General: No focal deficit present. Mental Status: He is alert and oriented to person, place, and time. Psychiatric:         Mood and Affect: Mood normal.         Behavior: Behavior normal.           ASSESSMENT/PLAN:    1. Chronic obstructive pulmonary disease, unspecified COPD type ([address]  The current medical regimen is effective;  continue present plan and medications. 2. Prostate cancer ([address]  Rpt psa w/ next labs. Has been well suppressed. 3. Coronary artery disease involving native coronary artery of native heart without angina pectoris  rec cardio f/u    4. Adult celiac disease  Ongoing issues. Overall doing fairly well. Refer to GI. Sig fam hx of colon cancer  - CALLIE Swann MD, Gastroenterology, [facility]    5. Family history of colon cancer  See above  - CALLIE Swann MD, Gastroenterology, [facility]    6. Severe obesity (BMI 35.0-39. 9) with comorbidity ([address]  Near baseline.       7. Mixed hyperlipidemia  The current medical regimen is effective;

## 2022-03-31 LAB
A/G RATIO: 1.7 G/DL (ref 1–2.5)
ALBUMIN SERPL-MCNC: 4.3 G/DL (ref 3.5–5)
ALP BLD-CCNC: 91 U/L (ref 38–126)
ALT SERPL-CCNC: 24 U/L (ref 0–49)
ANION GAP SERPL CALCULATED.3IONS-SCNC: 11.1 MMOL/L (ref 8–16)
AST SERPL-CCNC: 34 U/L (ref 17–59)
BASOPHILS RELATIVE PERCENT: 0.9 % (ref 0–1)
BILIRUB SERPL-MCNC: 0.6 MG/DL (ref 0.2–1.3)
BUN BLDV-MCNC: 19 MG/DL (ref 9–20)
CALCIUM SERPL-MCNC: 9.2 MG/DL (ref 8.6–10.3)
CHLORIDE BLD-SCNC: 108 MMOL/L (ref 98–107)
CO2: 22 MMOL/L (ref 22–30)
COAGULATION TISSUE FACTOR INDUCED BLD TIME PT. COAG: 10.8 SECS. (ref 9.4–12.2)
CREAT SERPL-MCNC: 0.8 MG/DL (ref 0.66–1.25)
EOSINOPHILS RELATIVE PERCENT: 1.6 % (ref 0–5)
ERYTHROCYTE DISTRIBUTION WIDTH RBC RATIO: 12.3 % (ref 11.6–14.8)
GFR CALCULATED: 94
GLOBULIN: 2.6 G/DL (ref 2–3.5)
GLUCOSE BLD-MCNC: 97 MG/DL (ref 74–100)
GRANULOCYTE ABSOLUTE COUNT: 2.4 X(10)3/UL (ref 1.8–7.2)
HCT VFR BLD CALC: 38.8 % (ref 37.4–53.8)
HEMOGLOBIN: 13.5 G/DL (ref 13.2–16.5)
IMMATURE GRANULOCYTES %: 0.2 % (ref 0–0.5)
INR BLD: 1.03 (ref 2–4)
LIPASE: 51 U/L (ref 23–300)
LYMPHOCYTES ABSOLUTE: 1.3 X(10)3/UL (ref 1.1–2.7)
LYMPHOCYTES RELATIVE PERCENT: 30.2 % (ref 15–45)
MCH RBC QN AUTO: 33.1 PG (ref 27.7–33.3)
MCHC RBC AUTO-ENTMCNC: 34.8 G/DL (ref 32.8–35)
MCV RBC AUTO: 95.1 FL (ref 80.5–96.9)
MONOCYTES RELATIVE PERCENT: 10.7 % (ref 0–12)
NEUTROPHILS/100 LEUKOCYTES: 56.4 % (ref 40–70)
PLATELETS: 177 X1000 (ref 129–332)
POTASSIUM SERPL-SCNC: 4.1 MMOL/L (ref 3.4–5.1)
RBC # BLD: 4.08 X1000000 (ref 4.16–5.62)
SODIUM BLD-SCNC: 137 MMOL/L (ref 137–145)
TOTAL PROTEIN: 6.9 G/DL (ref 6.3–8.2)
WBC # BLD: 4.3 X1000 (ref 5.4–9.9)

## 2022-04-04 LAB — CALPROTECTIN: 24 UG/G (ref 0–120)

## 2022-04-09 LAB
Lab: NORMAL
OVA + PARASITE EXAM: NORMAL

## 2022-07-28 ENCOUNTER — TELEPHONE (OUTPATIENT)
Dept: FAMILY MEDICINE CLINIC | Age: 77
End: 2022-07-28

## 2022-07-28 DIAGNOSIS — C61 PROSTATE CANCER (HCC): ICD-10-CM

## 2022-07-28 DIAGNOSIS — D64.9 ANEMIA, UNSPECIFIED TYPE: ICD-10-CM

## 2022-07-28 DIAGNOSIS — E78.2 MIXED HYPERLIPIDEMIA: Primary | ICD-10-CM

## 2022-08-02 ENCOUNTER — NURSE ONLY (OUTPATIENT)
Dept: FAMILY MEDICINE CLINIC | Age: 77
End: 2022-08-02
Payer: MEDICARE

## 2022-08-02 DIAGNOSIS — D64.9 ANEMIA, UNSPECIFIED TYPE: ICD-10-CM

## 2022-08-02 DIAGNOSIS — C61 PROSTATE CANCER (HCC): ICD-10-CM

## 2022-08-02 DIAGNOSIS — E78.2 MIXED HYPERLIPIDEMIA: ICD-10-CM

## 2022-08-02 LAB
A/G RATIO: 1.8 (ref 1.1–2.2)
ALBUMIN SERPL-MCNC: 4.4 G/DL (ref 3.4–5)
ALP BLD-CCNC: 100 U/L (ref 40–129)
ALT SERPL-CCNC: 23 U/L (ref 10–40)
ANION GAP SERPL CALCULATED.3IONS-SCNC: 11 MMOL/L (ref 3–16)
AST SERPL-CCNC: 26 U/L (ref 15–37)
BASOPHILS ABSOLUTE: 0.1 K/UL (ref 0–0.2)
BASOPHILS RELATIVE PERCENT: 1.4 %
BILIRUB SERPL-MCNC: 0.4 MG/DL (ref 0–1)
BUN BLDV-MCNC: 13 MG/DL (ref 7–20)
CALCIUM SERPL-MCNC: 9.3 MG/DL (ref 8.3–10.6)
CHLORIDE BLD-SCNC: 102 MMOL/L (ref 99–110)
CHOLESTEROL, TOTAL: 136 MG/DL (ref 0–199)
CO2: 25 MMOL/L (ref 21–32)
CREAT SERPL-MCNC: 0.9 MG/DL (ref 0.8–1.3)
EOSINOPHILS ABSOLUTE: 0.2 K/UL (ref 0–0.6)
EOSINOPHILS RELATIVE PERCENT: 3.4 %
GFR AFRICAN AMERICAN: >60
GFR NON-AFRICAN AMERICAN: >60
GLUCOSE BLD-MCNC: 97 MG/DL (ref 70–99)
HCT VFR BLD CALC: 39.2 % (ref 40.5–52.5)
HDLC SERPL-MCNC: 49 MG/DL (ref 40–60)
HEMOGLOBIN: 13.6 G/DL (ref 13.5–17.5)
LDL CHOLESTEROL CALCULATED: 71 MG/DL
LYMPHOCYTES ABSOLUTE: 1.2 K/UL (ref 1–5.1)
LYMPHOCYTES RELATIVE PERCENT: 24.6 %
MCH RBC QN AUTO: 34.1 PG (ref 26–34)
MCHC RBC AUTO-ENTMCNC: 34.8 G/DL (ref 31–36)
MCV RBC AUTO: 97.9 FL (ref 80–100)
MONOCYTES ABSOLUTE: 0.5 K/UL (ref 0–1.3)
MONOCYTES RELATIVE PERCENT: 9.9 %
NEUTROPHILS ABSOLUTE: 2.9 K/UL (ref 1.7–7.7)
NEUTROPHILS RELATIVE PERCENT: 60.7 %
PDW BLD-RTO: 13 % (ref 12.4–15.4)
PLATELET # BLD: 203 K/UL (ref 135–450)
PMV BLD AUTO: 9.5 FL (ref 5–10.5)
POTASSIUM SERPL-SCNC: 5 MMOL/L (ref 3.5–5.1)
PROSTATE SPECIFIC ANTIGEN: <0.01 NG/ML (ref 0–4)
RBC # BLD: 4 M/UL (ref 4.2–5.9)
SODIUM BLD-SCNC: 138 MMOL/L (ref 136–145)
TOTAL PROTEIN: 6.8 G/DL (ref 6.4–8.2)
TRIGL SERPL-MCNC: 82 MG/DL (ref 0–150)
VLDLC SERPL CALC-MCNC: 16 MG/DL
WBC # BLD: 4.8 K/UL (ref 4–11)

## 2022-08-02 PROCEDURE — 36415 COLL VENOUS BLD VENIPUNCTURE: CPT | Performed by: FAMILY MEDICINE

## 2022-08-15 ENCOUNTER — OFFICE VISIT (OUTPATIENT)
Dept: FAMILY MEDICINE CLINIC | Age: 77
End: 2022-08-15
Payer: MEDICARE

## 2022-08-15 VITALS
SYSTOLIC BLOOD PRESSURE: 118 MMHG | HEART RATE: 88 BPM | HEIGHT: 69 IN | OXYGEN SATURATION: 98 % | DIASTOLIC BLOOD PRESSURE: 82 MMHG | BODY MASS INDEX: 33.77 KG/M2 | WEIGHT: 228 LBS

## 2022-08-15 DIAGNOSIS — K21.9 GASTROESOPHAGEAL REFLUX DISEASE WITHOUT ESOPHAGITIS: ICD-10-CM

## 2022-08-15 DIAGNOSIS — C61 PROSTATE CANCER (HCC): ICD-10-CM

## 2022-08-15 DIAGNOSIS — M51.36 DDD (DEGENERATIVE DISC DISEASE), LUMBAR: ICD-10-CM

## 2022-08-15 DIAGNOSIS — I25.10 CORONARY ARTERY DISEASE INVOLVING NATIVE CORONARY ARTERY OF NATIVE HEART WITHOUT ANGINA PECTORIS: ICD-10-CM

## 2022-08-15 DIAGNOSIS — E78.2 MIXED HYPERLIPIDEMIA: ICD-10-CM

## 2022-08-15 DIAGNOSIS — J44.9 CHRONIC OBSTRUCTIVE PULMONARY DISEASE, UNSPECIFIED COPD TYPE (HCC): Primary | ICD-10-CM

## 2022-08-15 DIAGNOSIS — K90.0 ADULT CELIAC DISEASE: ICD-10-CM

## 2022-08-15 PROCEDURE — 1123F ACP DISCUSS/DSCN MKR DOCD: CPT | Performed by: FAMILY MEDICINE

## 2022-08-15 PROCEDURE — G8427 DOCREV CUR MEDS BY ELIG CLIN: HCPCS | Performed by: FAMILY MEDICINE

## 2022-08-15 PROCEDURE — 99214 OFFICE O/P EST MOD 30 MIN: CPT | Performed by: FAMILY MEDICINE

## 2022-08-15 PROCEDURE — 3023F SPIROM DOC REV: CPT | Performed by: FAMILY MEDICINE

## 2022-08-15 PROCEDURE — 1036F TOBACCO NON-USER: CPT | Performed by: FAMILY MEDICINE

## 2022-08-15 PROCEDURE — G8417 CALC BMI ABV UP PARAM F/U: HCPCS | Performed by: FAMILY MEDICINE

## 2022-08-15 RX ORDER — FLUTICASONE FUROATE AND VILANTEROL 200; 25 UG/1; UG/1
POWDER RESPIRATORY (INHALATION)
Qty: 3 EACH | Refills: 3 | Status: SHIPPED | OUTPATIENT
Start: 2022-08-15

## 2022-08-15 RX ORDER — ALBUTEROL SULFATE 90 UG/1
2 AEROSOL, METERED RESPIRATORY (INHALATION) EVERY 6 HOURS PRN
Qty: 1 EACH | Refills: 3 | Status: SHIPPED | OUTPATIENT
Start: 2022-08-15

## 2022-08-15 RX ORDER — OMEPRAZOLE 20 MG/1
20 CAPSULE, DELAYED RELEASE ORAL DAILY
Qty: 90 CAPSULE | Refills: 3 | Status: SHIPPED | OUTPATIENT
Start: 2022-08-15 | End: 2022-11-13

## 2022-08-15 RX ORDER — ATORVASTATIN CALCIUM 20 MG/1
TABLET, FILM COATED ORAL
Qty: 90 TABLET | Refills: 3 | Status: SHIPPED | OUTPATIENT
Start: 2022-08-15

## 2022-08-15 ASSESSMENT — PATIENT HEALTH QUESTIONNAIRE - PHQ9
4. FEELING TIRED OR HAVING LITTLE ENERGY: 0
10. IF YOU CHECKED OFF ANY PROBLEMS, HOW DIFFICULT HAVE THESE PROBLEMS MADE IT FOR YOU TO DO YOUR WORK, TAKE CARE OF THINGS AT HOME, OR GET ALONG WITH OTHER PEOPLE: 0
9. THOUGHTS THAT YOU WOULD BE BETTER OFF DEAD, OR OF HURTING YOURSELF: 0
SUM OF ALL RESPONSES TO PHQ QUESTIONS 1-9: 0
3. TROUBLE FALLING OR STAYING ASLEEP: 0
SUM OF ALL RESPONSES TO PHQ QUESTIONS 1-9: 0
8. MOVING OR SPEAKING SO SLOWLY THAT OTHER PEOPLE COULD HAVE NOTICED. OR THE OPPOSITE, BEING SO FIGETY OR RESTLESS THAT YOU HAVE BEEN MOVING AROUND A LOT MORE THAN USUAL: 0
SUM OF ALL RESPONSES TO PHQ QUESTIONS 1-9: 0
6. FEELING BAD ABOUT YOURSELF - OR THAT YOU ARE A FAILURE OR HAVE LET YOURSELF OR YOUR FAMILY DOWN: 0
SUM OF ALL RESPONSES TO PHQ QUESTIONS 1-9: 0
2. FEELING DOWN, DEPRESSED OR HOPELESS: 0
7. TROUBLE CONCENTRATING ON THINGS, SUCH AS READING THE NEWSPAPER OR WATCHING TELEVISION: 0
1. LITTLE INTEREST OR PLEASURE IN DOING THINGS: 0
SUM OF ALL RESPONSES TO PHQ9 QUESTIONS 1 & 2: 0
5. POOR APPETITE OR OVEREATING: 0

## 2022-08-15 ASSESSMENT — ENCOUNTER SYMPTOMS
BACK PAIN: 1
CONSTIPATION: 0
DIARRHEA: 0
SHORTNESS OF BREATH: 0

## 2022-08-15 NOTE — PROGRESS NOTES
Chief Complaint   Patient presents with    Hyperlipidemia       HPI:  Shwetha Sandoval is a 69 y.o. (: 1945) here today   for   Hyperlipidemia  This is a chronic problem. The current episode started more than 1 year ago. Pertinent negatives include no chest pain or shortness of breath. Current antihyperlipidemic treatment includes statins. There are no compliance problems. Risk factors for coronary artery disease include dyslipidemia and male sex. Doing well w/ breo. Rarely using albuterol. Occas gluten intake aggravates sxs. O/w doing well from GI standpoint. No new urinary sxs. No longer f/u w/ urology. Psa has been approp   suppressed. Back pain near baseline. No new issues. Has not seen cardio in some time. No new issues. Had been seeing     Patient's medications, allergies, past medical, surgical, social and family histories were reviewed and updated as appropriate. ROS:  Review of Systems   Constitutional:  Negative for fever. Respiratory:  Negative for shortness of breath. Cardiovascular:  Negative for chest pain. Musculoskeletal:  Positive for back pain (near baseline).          LDL Calculated (mg/dL)   Date Value   2022 71       Past Medical History:   Diagnosis Date    Asthma     CAD (coronary artery disease)     Celiac sprue     COPD (chronic obstructive pulmonary disease) (HCC)     DDD (degenerative disc disease), lumbar     Fatty liver     HTN (hypertension)     Hyperlipidemia     Paget's disease     [facility] ([facility] 2009       Family History   Problem Relation Age of Onset    Other Mother         macular degeneration, DVT     Colon Cancer Father     Other Brother         celiac    High Blood Pressure Brother     Diabetes Brother     Heart Attack Maternal Grandmother     Heart Attack Maternal Grandfather     Stroke Maternal Grandfather     Heart Attack Paternal Grandfather     Other Paternal Uncle         stomach cancer       Social History Socioeconomic History    Marital status:      Spouse name: Not on file    Number of children: Not on file    Years of education: Not on file    Highest education level: Not on file   Occupational History    Occupation: [employer]   Tobacco Use    Smoking status: Former     Packs/day: 0.10     Years: 4.00     Pack years: 0.40     Types: Cigars, Cigarettes     Start date: 1964     Quit date: 1970     Years since quittin.6    Smokeless tobacco: Never   Substance and Sexual Activity    Alcohol use: No    Drug use: No    Sexual activity: Yes   Other Topics Concern    Not on file   Social History Narrative    Not on file     Social Determinants of Health     Financial Resource Strain: Not on file   Food Insecurity: Not on file   Transportation Needs: Not on file   Physical Activity: Not on file   Stress: Not on file   Social Connections: Not on file   Intimate Partner Violence: Not on file   Housing Stability: Not on file       Prior to Visit Medications    Medication Sig Taking? Authorizing Provider   omeprazole (PRILOSEC) 20 MG delayed release capsule Take 1 capsule by mouth in the morning.  Yes Ty Ortega MD   albuterol sulfate HFA (PROVENTIL HFA) 108 (90 Base) MCG/ACT inhaler Inhale 2 puffs into the lungs every 6 hours as needed for Wheezing Yes Ty Ortega MD   Fluticasone furoate-vilanterol (BREO ELLIPTA) 200-25 MCG/INH AEPB inhaler INHALE ONE PUFF BY MOUTH DAILY Yes Ty Ortega MD   atorvastatin (LIPITOR) 20 MG tablet TAKE ONE TABLET BY MOUTH EVERY DAY Yes Ty Ortega MD   Glucos-MSM-C-Re-Vyomsl-Qpdqex (GLUCOSAMINE MSM COMPLEX PO) Take by mouth daily Yes Historical Provider, MD   polyethylene glycol (GLYCOLAX) 17 g packet Take 17 g by mouth daily as needed for Constipation Yes Historical Provider, MD   B Complex-C (SUPER B COMPLEX PO) Take by mouth daily Yes Historical Provider, MD   Cholecalciferol (VITAMIN D3) 125 MCG (5000 UT) TABS Take by mouth daily Yes Historical Provider, MD   Omega 3-6-9 Fatty Acids (OMEGA-3-6-9 PO) Take by mouth daily Yes Historical Provider, MD   Coenzyme Q10 (CO Q-10) 200 MG CAPS Take 200 mg by mouth daily Yes Historical Provider, MD   Melatonin 10 MG CAPS Take 10 mg by mouth nightly Yes Historical Provider, MD   Calcium-Magnesium-Vitamin D (CALCIUM MAGNESIUM PO) Take 1 tablet by mouth Daily. Dose is 1200/400  Yes Historical Provider, MD   acetaminophen (TYLENOL) 325 MG tablet Take 1,000 mg by mouth as needed. Yes Historical Provider, MD   therapeutic multivitamin-minerals (THERAGRAN-M) tablet Take 1 tablet by mouth daily. Yes Historical Provider, MD   aspirin 81 MG chewable tablet Take 81 mg by mouth once a week  Yes Historical Provider, MD       Allergies   Allergen Reactions    Pcn [Penicillins] Swelling    Wheat Extract Other (See Comments)     Celiac disease       OBJECTIVE:    /82   Pulse 88   Ht 5' 8.5\" (1.74 m)   Wt 228 lb (103.4 kg)   SpO2 98%   BMI 34.16 kg/m²     BP Readings from Last 2 Encounters:   08/15/22 118/82   02/15/22 (!) 92/56       Wt Readings from Last 3 Encounters:   08/15/22 228 lb (103.4 kg)   02/15/22 238 lb (108 kg)   08/18/21 236 lb 6.4 oz (107.2 kg)       Physical Exam  Constitutional:       Appearance: He is well-developed. HENT:      Head: Normocephalic and atraumatic. Eyes:      Conjunctiva/sclera: Conjunctivae normal.   Neck:      Trachea: No tracheal deviation. Cardiovascular:      Rate and Rhythm: Normal rate and regular rhythm. Heart sounds: No murmur heard. No friction rub. No gallop. Pulmonary:      Effort: Pulmonary effort is normal.      Breath sounds: Normal breath sounds. Abdominal:      Palpations: Abdomen is soft. Tenderness: There is no abdominal tenderness. Musculoskeletal:      Right lower leg: No edema. Left lower leg: No edema. Skin:     General: Skin is warm and dry. Neurological:      General: No focal deficit present.       Mental Status: He is alert and oriented to person, place, and time. Psychiatric:         Mood and Affect: Mood normal.         Behavior: Behavior normal.         ASSESSMENT/PLAN:    1. Chronic obstructive pulmonary disease, unspecified COPD type ([facility]  Doing well w/ breo. Rarely using albuterol. Refill meds. - albuterol sulfate HFA (PROVENTIL HFA) 108 (90 Base) MCG/ACT inhaler; Inhale 2 puffs into the lungs every 6 hours as needed for Wheezing  Dispense: 1 each; Refill: 3  - Fluticasone furoate-vilanterol (BREO ELLIPTA) 200-25 MCG/INH AEPB inhaler; INHALE ONE PUFF BY MOUTH DAILY  Dispense: 3 each; Refill: 3    2. Gastroesophageal reflux disease without esophagitis  The current medical regimen is effective;  continue present plan and medications. - omeprazole (PRILOSEC) 20 MG delayed release capsule; Take 1 capsule by mouth in the morning. Dispense: 90 capsule; Refill: 3    3. Mixed hyperlipidemia  Reviewed recent labs. Well controlled. Cont meds. - atorvastatin (LIPITOR) 20 MG tablet; TAKE ONE TABLET BY MOUTH EVERY DAY  Dispense: 90 tablet; Refill: 3    4. Prostate cancer ([facility]  Hx of.  Psa approp suppressed. Monitor w/ labs. 5. DDD (degenerative disc disease), lumbar  Near baseline. No new issues. 6. Adult celiac disease  Occas flair if exposed to gluten. O/w near baseline. 7. Coronary artery disease involving native coronary artery of native heart without angina pectoris  Has not seen cardio in some time. No new issues. Monitor. Cont risk factor modification/med mgmt.

## 2022-08-15 NOTE — PROGRESS NOTES
Chief Complaint   Patient presents with    Hyperlipidemia         ASSESSMENT/PLAN:    1. Gastroesophageal reflux disease without esophagitis  The current medical regimen is effective;  continue present plan and medications. - omeprazole (PRILOSEC) 20 MG delayed release capsule; Take 1 capsule by mouth in the morning. Dispense: 90 capsule; Refill: 3    2. Chronic obstructive pulmonary disease, unspecified COPD type ([facility]  The current medical regimen is effective;  continue present plan and medications. Well controlled with the [address] [facility] [address] Rare use of the KARLA. - albuterol sulfate HFA (PROVENTIL HFA) 108 (90 Base) MCG/ACT inhaler; Inhale 2 puffs into the lungs every 6 hours as needed for Wheezing  Dispense: 1 each; Refill: 3  - Fluticasone furoate-vilanterol (BREO ELLIPTA) 200-25 MCG/INH AEPB inhaler; INHALE ONE PUFF BY MOUTH DAILY  Dispense: 3 each; Refill: 3    3. Mixed hyperlipidemia  The current medical regimen is effective;  continue present plan and medications. - atorvastatin (LIPITOR) 20 MG tablet; TAKE ONE TABLET BY MOUTH EVERY DAY  Dispense: 90 tablet; Refill: 3    4. Prostate cancer [facility])  Recent PSA was normal. Urology has said he does not need to f/u unless new symptoms arise. Monitor PSA. 5. DDD (degenerative disc disease), lumbar  At baseline. Continue using a support belt. Not interested in ortho referral at this time. 6. Adult celiac disease  Has eliminated gluten from the diet and has no symptoms currently. 7. Coronary artery disease involving native coronary artery of native heart without angina pectoris  No heart symptoms at this time. Last saw cardio 2 yrs ago. Monitor for now. HPI:  Yamilka Reid is a 69 y.o. (: 1945) here today   for   Hyperlipidemia  Pertinent negatives include no chest pain or shortness of breath. Overall doing well. Reviewed recent labs. Normal CMP, lipids and PSA. Slightly anemic but has been for several years now.      Has not had to use the albuteral inhaler at all. The LABA+steroid seems to control the breathing well. Heart burn well controlled. DDD symptoms at baseline. Not interested in looking into further options at this time. Has lost 10lbs in a year. Attribute to being more active. Patient's medications, allergies, past medical, surgical, social and family histories were reviewed and updated as appropriate. ROS:  Review of Systems   Constitutional:  Negative for fever and unexpected weight change. Respiratory:  Negative for shortness of breath. Cardiovascular:  Negative for chest pain and palpitations. Gastrointestinal:  Negative for constipation and diarrhea. Genitourinary:  Negative for decreased urine volume, difficulty urinating, dysuria, hematuria and urgency.          LDL Calculated (mg/dL)   Date Value   08/02/2022 71       Past Medical History:   Diagnosis Date    Asthma     CAD (coronary artery disease)     Celiac sprue     COPD (chronic obstructive pulmonary disease) (HCC)     DDD (degenerative disc disease), lumbar     Fatty liver     HTN (hypertension)     Hyperlipidemia     Paget's disease     Prostate cancer ([facility] 07/2009       Family History   Problem Relation Age of Onset    Other Mother         macular degeneration, DVT     Colon Cancer Father     Other Brother         celiac    High Blood Pressure Brother     Diabetes Brother     Heart Attack Maternal Grandmother     Heart Attack Maternal Grandfather     Stroke Maternal Grandfather     Heart Attack Paternal Grandfather     Other Paternal Uncle         stomach cancer       Social History     Socioeconomic History    Marital status:      Spouse name: Not on file    Number of children: Not on file    Years of education: Not on file    Highest education level: Not on file   Occupational History    Occupation: [employer]   Tobacco Use    Smoking status: Former     Packs/day: 0.10     Years: 4.00     Pack years: 0.40     Types: Cigars, Cigarettes Start date: 1964     Quit date: 1970     Years since quittin.6    Smokeless tobacco: Never   Substance and Sexual Activity    Alcohol use: No    Drug use: No    Sexual activity: Yes   Other Topics Concern    Not on file   Social History Narrative    Not on file     Social Determinants of Health     Financial Resource Strain: Not on file   Food Insecurity: Not on file   Transportation Needs: Not on file   Physical Activity: Not on file   Stress: Not on file   Social Connections: Not on file   Intimate Partner Violence: Not on file   Housing Stability: Not on file       Prior to Visit Medications    Medication Sig Taking? Authorizing Provider   omeprazole (PRILOSEC) 20 MG delayed release capsule Take 1 capsule by mouth in the morning. Yes Leo Arroyo MD   albuterol sulfate HFA (PROVENTIL HFA) 108 (90 Base) MCG/ACT inhaler Inhale 2 puffs into the lungs every 6 hours as needed for Wheezing Yes Leo Arroyo MD   Fluticasone furoate-vilanterol (BREO ELLIPTA) 200-25 MCG/INH AEPB inhaler INHALE ONE PUFF BY MOUTH DAILY Yes Leo Arroyo MD   atorvastatin (LIPITOR) 20 MG tablet TAKE ONE TABLET BY MOUTH EVERY DAY Yes Leo Arroyo MD   Glucos-MSM-C-Nq-Bynlet-Ikjzco (GLUCOSAMINE MSM COMPLEX PO) Take by mouth daily Yes Historical Provider, MD   polyethylene glycol (GLYCOLAX) 17 g packet Take 17 g by mouth daily as needed for Constipation Yes Historical Provider, MD   B Complex-C (SUPER B COMPLEX PO) Take by mouth daily Yes Historical Provider, MD   Cholecalciferol (VITAMIN D3) 125 MCG (5000 UT) TABS Take by mouth daily Yes Historical Provider, MD   Omega 3-6-9 Fatty Acids (OMEGA-3-6-9 PO) Take by mouth daily Yes Historical Provider, MD   Coenzyme Q10 (CO Q-10) 200 MG CAPS Take 200 mg by mouth daily Yes Historical Provider, MD   Melatonin 10 MG CAPS Take 10 mg by mouth nightly Yes Historical Provider, MD   Calcium-Magnesium-Vitamin D (CALCIUM MAGNESIUM PO) Take 1 tablet by mouth Daily.  Dose is 1200/400 Yes Historical Provider, MD   acetaminophen (TYLENOL) 325 MG tablet Take 1,000 mg by mouth as needed. Yes Historical Provider, MD   therapeutic multivitamin-minerals (THERAGRAN-M) tablet Take 1 tablet by mouth daily. Yes Historical Provider, MD   aspirin 81 MG chewable tablet Take 81 mg by mouth once a week  Yes Historical Provider, MD       Allergies   Allergen Reactions    Pcn [Penicillins] Swelling    Wheat Extract Other (See Comments)     Celiac disease       OBJECTIVE:    /82   Pulse 88   Ht 5' 8.5\" (1.74 m)   Wt 228 lb (103.4 kg)   SpO2 98%   BMI 34.16 kg/m²     BP Readings from Last 2 Encounters:   08/15/22 118/82   02/15/22 (!) 92/56       Wt Readings from Last 3 Encounters:   08/15/22 228 lb (103.4 kg)   02/15/22 238 lb (108 kg)   08/18/21 236 lb 6.4 oz (107.2 kg)       Physical Exam  Constitutional:       Appearance: Normal appearance. He is well-developed. HENT:      Head: Normocephalic and atraumatic. Eyes:      Extraocular Movements: Extraocular movements intact. Neck:      Trachea: No tracheal deviation. Cardiovascular:      Rate and Rhythm: Normal rate and regular rhythm. Pulses: Normal pulses. Heart sounds: Normal heart sounds. No murmur heard. Pulmonary:      Effort: Pulmonary effort is normal.      Breath sounds: Normal breath sounds. Abdominal:      Palpations: Abdomen is soft. Tenderness: There is no abdominal tenderness. Musculoskeletal:      Right lower leg: No edema. Left lower leg: No edema. Skin:     General: Skin is warm and dry. Neurological:      General: No focal deficit present. Mental Status: He is alert and oriented to person, place, and time.    Psychiatric:         Mood and Affect: Mood normal.         Behavior: Behavior normal.           Lemmie Rosalina

## 2022-08-17 ENCOUNTER — TELEMEDICINE (OUTPATIENT)
Dept: FAMILY MEDICINE CLINIC | Age: 77
End: 2022-08-17
Payer: MEDICARE

## 2022-08-17 DIAGNOSIS — Z00.00 MEDICARE ANNUAL WELLNESS VISIT, SUBSEQUENT: Primary | ICD-10-CM

## 2022-08-17 PROCEDURE — G0439 PPPS, SUBSEQ VISIT: HCPCS | Performed by: FAMILY MEDICINE

## 2022-08-17 PROCEDURE — 1123F ACP DISCUSS/DSCN MKR DOCD: CPT | Performed by: FAMILY MEDICINE

## 2022-08-17 SDOH — ECONOMIC STABILITY: FOOD INSECURITY: WITHIN THE PAST 12 MONTHS, THE FOOD YOU BOUGHT JUST DIDN'T LAST AND YOU DIDN'T HAVE MONEY TO GET MORE.: NEVER TRUE

## 2022-08-17 SDOH — ECONOMIC STABILITY: FOOD INSECURITY: WITHIN THE PAST 12 MONTHS, YOU WORRIED THAT YOUR FOOD WOULD RUN OUT BEFORE YOU GOT MONEY TO BUY MORE.: NEVER TRUE

## 2022-08-17 ASSESSMENT — PATIENT HEALTH QUESTIONNAIRE - PHQ9
SUM OF ALL RESPONSES TO PHQ QUESTIONS 1-9: 0
SUM OF ALL RESPONSES TO PHQ QUESTIONS 1-9: 0
1. LITTLE INTEREST OR PLEASURE IN DOING THINGS: 0
2. FEELING DOWN, DEPRESSED OR HOPELESS: 0
SUM OF ALL RESPONSES TO PHQ QUESTIONS 1-9: 0
SUM OF ALL RESPONSES TO PHQ QUESTIONS 1-9: 0
SUM OF ALL RESPONSES TO PHQ9 QUESTIONS 1 & 2: 0

## 2022-08-17 ASSESSMENT — LIFESTYLE VARIABLES
HOW OFTEN DO YOU HAVE A DRINK CONTAINING ALCOHOL: NEVER
HOW MANY STANDARD DRINKS CONTAINING ALCOHOL DO YOU HAVE ON A TYPICAL DAY: PATIENT DOES NOT DRINK

## 2022-08-17 ASSESSMENT — SOCIAL DETERMINANTS OF HEALTH (SDOH): HOW HARD IS IT FOR YOU TO PAY FOR THE VERY BASICS LIKE FOOD, HOUSING, MEDICAL CARE, AND HEATING?: NOT HARD AT ALL

## 2022-08-17 NOTE — PROGRESS NOTES
halls, or stairs?: No  Do you have either shower bars, grab bars, non-slip mats or non-slip surfaces in your shower or bathtub?: Yes  Do all of your stairways have a railing or banister?: Yes  Do you always fasten your seatbelt when you are in a car?: Yes  Safety Interventions:  Patient declines any further evaluation/treatment for this issue           Objective      Patient-Reported Vitals  Patient-Reported Systolic (Top): 793 mmHg  Patient-Reported Diastolic (Bottom): 82 mmHg  Patient-Reported Pulse: 88  Patient-Reported Weight: 228 lbs            Allergies   Allergen Reactions    Pcn [Penicillins] Swelling    Wheat Extract Other (See Comments)     Celiac disease     Prior to Visit Medications    Medication Sig Taking? Authorizing Provider   omeprazole (PRILOSEC) 20 MG delayed release capsule Take 1 capsule by mouth in the morning.   Romeo Lincoln MD   albuterol sulfate HFA (PROVENTIL HFA) 108 (90 Base) MCG/ACT inhaler Inhale 2 puffs into the lungs every 6 hours as needed for Wheezing  Romeo Lincoln MD   Fluticasone furoate-vilanterol (BREO ELLIPTA) 200-25 MCG/INH AEPB inhaler INHALE ONE PUFF BY MOUTH DAILY  Romeo Lincoln MD   atorvastatin (LIPITOR) 20 MG tablet TAKE ONE TABLET BY MOUTH EVERY DAY  Romeo Lincoln MD   Glucos-MSM-C-Mu-Bkqcxk-Nvzfui (GLUCOSAMINE MSM COMPLEX PO) Take by mouth daily  Historical Provider, MD   polyethylene glycol (GLYCOLAX) 17 g packet Take 17 g by mouth daily as needed for Constipation  Historical Provider, MD   B Complex-C (SUPER B COMPLEX PO) Take by mouth daily  Historical Provider, MD   Cholecalciferol (VITAMIN D3) 125 MCG (5000 UT) TABS Take by mouth daily  Historical Provider, MD   Omega 3-6-9 Fatty Acids (OMEGA-3-6-9 PO) Take by mouth daily  Historical Provider, MD   Coenzyme Q10 (CO Q-10) 200 MG CAPS Take 200 mg by mouth daily  Historical Provider, MD   Melatonin 10 MG CAPS Take 10 mg by mouth nightly  Historical Provider, MD   Calcium-Magnesium-Vitamin D (CALCIUM MAGNESIUM PO) Take 1 tablet by mouth Daily. Dose is 1200/400   Historical Provider, MD   acetaminophen (TYLENOL) 325 MG tablet Take 1,000 mg by mouth as needed. Historical Provider, MD   therapeutic multivitamin-minerals (THERAGRAN-M) tablet Take 1 tablet by mouth daily. Historical Provider, MD   aspirin 81 MG chewable tablet Take 81 mg by mouth once a week   Historical Provider, MD [place] (Including outside providers/suppliers regularly involved in providing care):   Patient Care Team:  Mike Frank MD as PCP - General (Family Medicine)  Mike Frank MD as PCP - [facility]     Reviewed and updated this visit:  Tobacco  Allergies  Meds  Med Hx  Surg Hx  Soc Hx  Fam Hx          Diania Fleeting, was evaluated through a synchronous (real-time) telephone encounter. The patient (or guardian if applicable) is aware that this is a billable service, which includes applicable co-pays. This Virtual Visit was conducted with patient's (and/or legal guardian's) consent. The visit was conducted pursuant to the emergency declaration under the [address] [place] [address], [address] [place] [address] [facility] authority and the [employer] General Act. Patient identification was verified, and a caregiver was present when appropriate. The patient was located at Home: [address] [place] [address]. Provider was located at [facility] ([address] [place] [address]): [address] [facility],  [address] [facility]. Gabriela Padron LPN, [id number]/73/2466, performed the documented evaluation under the direct supervision of the attending physician. This encounter was performed under my, Tyson Joyner, direct supervision, 8/17/2022.

## 2022-08-17 NOTE — PATIENT INSTRUCTIONS
Personalized Preventive Plan for Desiree Domingo - 8/17/2022  Medicare offers a range of preventive health benefits. Some of the tests and screenings are paid in full while other may be subject to a deductible, co-insurance, and/or copay. Some of these benefits include a comprehensive review of your medical history including lifestyle, illnesses that may run in your family, and various assessments and screenings as appropriate. After reviewing your medical record and screening and assessments performed today your provider may have ordered immunizations, labs, imaging, and/or referrals for you. A list of these orders (if applicable) as well as your Preventive Care list are included within your After Visit Summary for your review. Other Preventive Recommendations:    A preventive eye exam performed by an eye specialist is recommended every 1-2 years to screen for glaucoma; cataracts, macular degeneration, and other eye disorders. A preventive dental visit is recommended every 6 months. Try to get at least 150 minutes of exercise per week or 10,000 steps per day on a pedometer . Order or download the FREE \"Exercise & Physical Activity: Your Everyday Guide\" from [employer]. Call 4-706.322.7163 or search [employer] online. You need 3291-5013 mg of calcium and 4570-0051 IU of vitamin D per day. It is possible to meet your calcium requirement with diet alone, but a vitamin D supplement is usually necessary to meet this goal.  When exposed to the sun, use a sunscreen that protects against both UVA and UVB radiation with an SPF of 30 or greater. Reapply every 2 to 3 hours or after sweating, drying off with a towel, or swimming. Always wear a seat belt when traveling in a car. Always wear a helmet when riding a bicycle or motorcycle.

## 2022-11-28 DIAGNOSIS — J44.9 CHRONIC OBSTRUCTIVE PULMONARY DISEASE, UNSPECIFIED COPD TYPE (HCC): ICD-10-CM

## 2022-11-30 ENCOUNTER — TELEPHONE (OUTPATIENT)
Dept: FAMILY MEDICINE CLINIC | Age: 77
End: 2022-11-30
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
## 2022-11-30 RX ORDER — DOXYCYCLINE HYCLATE 100 MG
200 TABLET ORAL ONCE
Qty: 2 TABLET | Refills: 0 | Status: SHIPPED | OUTPATIENT
Start: 2022-11-30 | End: 2022-11-30

## 2022-11-30 NOTE — TELEPHONE ENCOUNTER
Pt thinks it was present for maybe a day or so. The tick was dead when pulled off. The red area was maybe the size of a pencil eraser.  Akira/braulio

## 2022-11-30 NOTE — TELEPHONE ENCOUNTER
How long had tick been present? Was it still alive? Doubt blood test necessary if only local redness.   Could consider prophylactic abx depending on how long present, etc

## 2022-11-30 NOTE — TELEPHONE ENCOUNTER
Pt wife pulled a tic off of him and the tic was gray has a red area. Was wondering if they need to come in and be seen or need antibiotics.  Or do blood test Please call them back at  [phone]

## 2022-12-12 ENCOUNTER — HOSPITAL ENCOUNTER (OUTPATIENT)
Age: 77
Setting detail: OBSERVATION
Discharge: HOME | End: 2022-12-13
Attending: STUDENT IN AN ORGANIZED HEALTH CARE EDUCATION/TRAINING PROGRAM | Admitting: INTERNAL MEDICINE
Payer: MEDICARE

## 2022-12-12 ENCOUNTER — TELEPHONE (OUTPATIENT)
Dept: FAMILY MEDICINE CLINIC | Age: 77
End: 2022-12-12

## 2022-12-12 ENCOUNTER — APPOINTMENT (OUTPATIENT)
Dept: GENERAL RADIOLOGY | Age: 77
End: 2022-12-12
Payer: MEDICARE

## 2022-12-12 DIAGNOSIS — I48.91 NEW ONSET A-FIB (HCC): ICD-10-CM

## 2022-12-12 DIAGNOSIS — I10 HYPERTENSION, UNSPECIFIED TYPE: ICD-10-CM

## 2022-12-12 DIAGNOSIS — I48.91 NEW ONSET ATRIAL FIBRILLATION (HCC): Primary | ICD-10-CM

## 2022-12-12 LAB
A/G RATIO: 1.4 (ref 1.1–2.2)
ALBUMIN SERPL-MCNC: 4.4 G/DL (ref 3.4–5)
ALP BLD-CCNC: 110 U/L (ref 40–129)
ALT SERPL-CCNC: 27 U/L (ref 10–40)
ANION GAP SERPL CALCULATED.3IONS-SCNC: 10 MMOL/L (ref 3–16)
AST SERPL-CCNC: 46 U/L (ref 15–37)
BASOPHILS ABSOLUTE: 0 K/UL (ref 0–0.2)
BASOPHILS RELATIVE PERCENT: 1.2 %
BILIRUB SERPL-MCNC: 0.5 MG/DL (ref 0–1)
BUN BLDV-MCNC: 11 MG/DL (ref 7–20)
CALCIUM SERPL-MCNC: 9.6 MG/DL (ref 8.3–10.6)
CHLORIDE BLD-SCNC: 102 MMOL/L (ref 99–110)
CO2: 24 MMOL/L (ref 21–32)
CREAT SERPL-MCNC: 0.7 MG/DL (ref 0.8–1.3)
EKG ATRIAL RATE: 288 BPM
EKG DIAGNOSIS: NORMAL
EKG Q-T INTERVAL: 402 MS
EKG QRS DURATION: 88 MS
EKG QTC CALCULATION (BAZETT): 440 MS
EKG R AXIS: -11 DEGREES
EKG T AXIS: 33 DEGREES
EKG VENTRICULAR RATE: 72 BPM
EOSINOPHILS ABSOLUTE: 0.1 K/UL (ref 0–0.6)
EOSINOPHILS RELATIVE PERCENT: 2.3 %
GFR SERPL CREATININE-BSD FRML MDRD: >60 ML/MIN/{1.73_M2}
GLUCOSE BLD-MCNC: 99 MG/DL (ref 70–99)
HCT VFR BLD CALC: 42.6 % (ref 40.5–52.5)
HEMOGLOBIN: 14.7 G/DL (ref 13.5–17.5)
LYMPHOCYTES ABSOLUTE: 0.8 K/UL (ref 1–5.1)
LYMPHOCYTES RELATIVE PERCENT: 19.2 %
MAGNESIUM: 2.1 MG/DL (ref 1.8–2.4)
MCH RBC QN AUTO: 33.6 PG (ref 26–34)
MCHC RBC AUTO-ENTMCNC: 34.5 G/DL (ref 31–36)
MCV RBC AUTO: 97.4 FL (ref 80–100)
MONOCYTES ABSOLUTE: 0.3 K/UL (ref 0–1.3)
MONOCYTES RELATIVE PERCENT: 8.1 %
NEUTROPHILS ABSOLUTE: 2.9 K/UL (ref 1.7–7.7)
NEUTROPHILS RELATIVE PERCENT: 69.2 %
PDW BLD-RTO: 13.1 % (ref 12.4–15.4)
PLATELET # BLD: 199 K/UL (ref 135–450)
PMV BLD AUTO: 8.2 FL (ref 5–10.5)
POTASSIUM SERPL-SCNC: 4.6 MMOL/L (ref 3.5–5.1)
PRO-BNP: 905 PG/ML (ref 0–449)
RBC # BLD: 4.37 M/UL (ref 4.2–5.9)
SODIUM BLD-SCNC: 136 MMOL/L (ref 136–145)
T4 FREE: 1.2 NG/DL (ref 0.9–1.8)
TOTAL PROTEIN: 7.6 G/DL (ref 6.4–8.2)
TROPONIN: <0.01 NG/ML
TSH REFLEX: 4.25 UIU/ML (ref 0.27–4.2)
WBC # BLD: 4.2 K/UL (ref 4–11)

## 2022-12-12 PROCEDURE — 80053 COMPREHEN METABOLIC PANEL: CPT

## 2022-12-12 PROCEDURE — 93010 ELECTROCARDIOGRAM REPORT: CPT | Performed by: INTERNAL MEDICINE

## 2022-12-12 PROCEDURE — 96372 THER/PROPH/DIAG INJ SC/IM: CPT

## 2022-12-12 PROCEDURE — 6370000000 HC RX 637 (ALT 250 FOR IP): Performed by: INTERNAL MEDICINE

## 2022-12-12 PROCEDURE — 84439 ASSAY OF FREE THYROXINE: CPT

## 2022-12-12 PROCEDURE — 94640 AIRWAY INHALATION TREATMENT: CPT

## 2022-12-12 PROCEDURE — 85025 COMPLETE CBC W/AUTO DIFF WBC: CPT

## 2022-12-12 PROCEDURE — G0378 HOSPITAL OBSERVATION PER HR: HCPCS

## 2022-12-12 PROCEDURE — 83880 ASSAY OF NATRIURETIC PEPTIDE: CPT

## 2022-12-12 PROCEDURE — 83735 ASSAY OF MAGNESIUM: CPT

## 2022-12-12 PROCEDURE — 2580000003 HC RX 258: Performed by: INTERNAL MEDICINE

## 2022-12-12 PROCEDURE — 93005 ELECTROCARDIOGRAM TRACING: CPT | Performed by: STUDENT IN AN ORGANIZED HEALTH CARE EDUCATION/TRAINING PROGRAM

## 2022-12-12 PROCEDURE — 99285 EMERGENCY DEPT VISIT HI MDM: CPT

## 2022-12-12 PROCEDURE — 71045 X-RAY EXAM CHEST 1 VIEW: CPT

## 2022-12-12 PROCEDURE — 36415 COLL VENOUS BLD VENIPUNCTURE: CPT

## 2022-12-12 PROCEDURE — 84484 ASSAY OF TROPONIN QUANT: CPT

## 2022-12-12 PROCEDURE — 6360000002 HC RX W HCPCS: Performed by: INTERNAL MEDICINE

## 2022-12-12 PROCEDURE — 84443 ASSAY THYROID STIM HORMONE: CPT

## 2022-12-12 RX ORDER — ENOXAPARIN SODIUM 150 MG/ML
1 INJECTION SUBCUTANEOUS 2 TIMES DAILY
Status: DISCONTINUED | OUTPATIENT
Start: 2022-12-12 | End: 2022-12-13

## 2022-12-12 RX ORDER — SODIUM CHLORIDE 0.9 % (FLUSH) 0.9 %
5-40 SYRINGE (ML) INJECTION PRN
Status: DISCONTINUED | OUTPATIENT
Start: 2022-12-12 | End: 2022-12-13 | Stop reason: HOSPADM

## 2022-12-12 RX ORDER — UBIDECARENONE 75 MG
200 CAPSULE ORAL DAILY
Status: DISCONTINUED | OUTPATIENT
Start: 2022-12-13 | End: 2022-12-12 | Stop reason: RX

## 2022-12-12 RX ORDER — ATORVASTATIN CALCIUM 10 MG/1
20 TABLET, FILM COATED ORAL NIGHTLY
Status: DISCONTINUED | OUTPATIENT
Start: 2022-12-12 | End: 2022-12-13 | Stop reason: HOSPADM

## 2022-12-12 RX ORDER — MECOBALAMIN 5000 MCG
10 TABLET,DISINTEGRATING ORAL NIGHTLY
Status: DISCONTINUED | OUTPATIENT
Start: 2022-12-12 | End: 2022-12-13 | Stop reason: HOSPADM

## 2022-12-12 RX ORDER — ALBUTEROL SULFATE 90 UG/1
2 AEROSOL, METERED RESPIRATORY (INHALATION) EVERY 6 HOURS PRN
Status: DISCONTINUED | OUTPATIENT
Start: 2022-12-12 | End: 2022-12-13 | Stop reason: HOSPADM

## 2022-12-12 RX ORDER — ACETAMINOPHEN 325 MG/1
650 TABLET ORAL EVERY 6 HOURS PRN
Status: DISCONTINUED | OUTPATIENT
Start: 2022-12-12 | End: 2022-12-13 | Stop reason: HOSPADM

## 2022-12-12 RX ORDER — ONDANSETRON 2 MG/ML
4 INJECTION INTRAMUSCULAR; INTRAVENOUS EVERY 6 HOURS PRN
Status: DISCONTINUED | OUTPATIENT
Start: 2022-12-12 | End: 2022-12-13 | Stop reason: HOSPADM

## 2022-12-12 RX ORDER — LORAZEPAM 0.5 MG
1 TABLET ORAL DAILY
Status: DISCONTINUED | OUTPATIENT
Start: 2022-12-13 | End: 2022-12-12 | Stop reason: RX

## 2022-12-12 RX ORDER — M-VIT,TX,IRON,MINS/CALC/FOLIC 27MG-0.4MG
1 TABLET ORAL DAILY
Status: DISCONTINUED | OUTPATIENT
Start: 2022-12-13 | End: 2022-12-13 | Stop reason: HOSPADM

## 2022-12-12 RX ORDER — ONDANSETRON 4 MG/1
4 TABLET, ORALLY DISINTEGRATING ORAL EVERY 8 HOURS PRN
Status: DISCONTINUED | OUTPATIENT
Start: 2022-12-12 | End: 2022-12-13 | Stop reason: HOSPADM

## 2022-12-12 RX ORDER — SODIUM CHLORIDE 0.9 % (FLUSH) 0.9 %
5-40 SYRINGE (ML) INJECTION EVERY 12 HOURS SCHEDULED
Status: DISCONTINUED | OUTPATIENT
Start: 2022-12-12 | End: 2022-12-13 | Stop reason: HOSPADM

## 2022-12-12 RX ORDER — POLYETHYLENE GLYCOL 3350 17 G/17G
17 POWDER, FOR SOLUTION ORAL DAILY PRN
Status: DISCONTINUED | OUTPATIENT
Start: 2022-12-12 | End: 2022-12-13 | Stop reason: HOSPADM

## 2022-12-12 RX ORDER — ACETAMINOPHEN 650 MG/1
650 SUPPOSITORY RECTAL EVERY 6 HOURS PRN
Status: DISCONTINUED | OUTPATIENT
Start: 2022-12-12 | End: 2022-12-13 | Stop reason: HOSPADM

## 2022-12-12 RX ORDER — PANTOPRAZOLE SODIUM 20 MG/1
20 TABLET, DELAYED RELEASE ORAL
Status: DISCONTINUED | OUTPATIENT
Start: 2022-12-13 | End: 2022-12-13 | Stop reason: HOSPADM

## 2022-12-12 RX ORDER — MAGNESIUM HYDROXIDE/ALUMINUM HYDROXICE/SIMETHICONE 120; 1200; 1200 MG/30ML; MG/30ML; MG/30ML
30 SUSPENSION ORAL ONCE
Status: DISCONTINUED | OUTPATIENT
Start: 2022-12-12 | End: 2022-12-13 | Stop reason: HOSPADM

## 2022-12-12 RX ORDER — SODIUM CHLORIDE 9 MG/ML
INJECTION, SOLUTION INTRAVENOUS PRN
Status: DISCONTINUED | OUTPATIENT
Start: 2022-12-12 | End: 2022-12-13 | Stop reason: HOSPADM

## 2022-12-12 RX ADMIN — Medication 2 PUFF: at 20:09

## 2022-12-12 RX ADMIN — Medication 10 MG: at 21:03

## 2022-12-12 RX ADMIN — Medication 10 ML: at 21:03

## 2022-12-12 RX ADMIN — ATORVASTATIN CALCIUM 20 MG: 10 TABLET, FILM COATED ORAL at 21:03

## 2022-12-12 RX ADMIN — ENOXAPARIN SODIUM 105 MG: 150 INJECTION SUBCUTANEOUS at 21:03

## 2022-12-12 ASSESSMENT — PAIN - FUNCTIONAL ASSESSMENT
PAIN_FUNCTIONAL_ASSESSMENT: NONE - DENIES PAIN

## 2022-12-12 NOTE — H&P
Hospital Medicine History & Physical      PCP: Flavia Stovall MD    Date of Admission: 12/12/2022    Date of Service: Pt seen/examined on 12/12/2022 and  Placed in Observation. Chief Complaint: Found to be in A. fib during colonoscopy      History Of Present Illness:    69 y.o. male who presented to [facility] with above complaints  Patient presented to the ED today from outpatient colonoscopy after he was found to be in A. Fib. Patient's spouse reports that patient had a routine colonoscopy today. In the recovery room he was found to be in A. fib on the telemetry. So he was sent to the ED for further evaluation and management. Patient denies any history of A. fib in the past.  He does have history of COPD. Not on home oxygen. Well-controlled with inhalers at home. He reports he had an angiogram of the heart in 2016 and did not have any blockages. Patient denies any palpitations, he does have mild exertional shortness of breath during severe exertion, otherwise tolerates ADLs and IADLs very well. He reportedly takes his dog for a walk for a mile every morning and does not experience any shortness of breath. Denies any leg swelling, PND or orthopnea. Denies any symptoms of lightheadedness or syncope. Denies any chest pain. Denies history of TERRI, HTN or valvular heart disease. Denies any history of thyroid. Does not consume any alcohol due to history of gluten allergy.     Past Medical History:          Diagnosis Date    Asthma     CAD (coronary artery disease)     Celiac sprue     COPD (chronic obstructive pulmonary disease) (HCC)     DDD (degenerative disc disease), lumbar     Fatty liver     HTN (hypertension)     Hyperlipidemia     Paget's disease     Prostate cancer ([facility] [address] 07/2009       Past Surgical History:          Procedure Laterality Date    COLONOSCOPY  11/2017    HERNIA REPAIR      PROSTATECTOMY      UPPER GASTROINTESTINAL ENDOSCOPY  11/19/2012       Medications Prior to Admission:      Prior to Admission medications    Medication Sig Start Date End Date Taking? Authorizing Provider   Fluticasone furoate-vilanterol (BREO ELLIPTA) 200-25 MCG/INH AEPB inhaler INHALE ONE PUFF BY MOUTH DAILY 11/28/22   Lauraine Cowden, MD   omeprazole (PRILOSEC) 20 MG delayed release capsule Take 1 capsule by mouth in the morning. 8/15/22 11/13/22  Lauraine Cowden, MD   albuterol sulfate HFA (PROVENTIL HFA) 108 (90 Base) MCG/ACT inhaler Inhale 2 puffs into the lungs every 6 hours as needed for Wheezing 8/15/22   Lauraine Cowden, MD   atorvastatin (LIPITOR) 20 MG tablet TAKE ONE TABLET BY MOUTH EVERY DAY 8/15/22   Lauraine Cowden, MD   Glucos-MSM-C-Xy-Nedtvh-Hmvvlv (GLUCOSAMINE MSM COMPLEX PO) Take by mouth daily    Historical Provider, MD   polyethylene glycol (GLYCOLAX) 17 g packet Take 17 g by mouth daily as needed for Constipation    Historical Provider, MD   B Complex-C (SUPER B COMPLEX PO) Take by mouth daily    Historical Provider, MD   Cholecalciferol (VITAMIN D3) 125 MCG (5000 UT) TABS Take by mouth daily    Historical Provider, MD   Omega 3-6-9 Fatty Acids (OMEGA-3-6-9 PO) Take by mouth daily    Historical Provider, MD   Coenzyme Q10 (CO Q-10) 200 MG CAPS Take 200 mg by mouth daily    Historical Provider, MD   Melatonin 10 MG CAPS Take 10 mg by mouth nightly    Historical Provider, MD   Calcium-Magnesium-Vitamin D (CALCIUM MAGNESIUM PO) Take 1 tablet by mouth Daily. Dose is 1200/400     Historical Provider, MD   acetaminophen (TYLENOL) 325 MG tablet Take 1,000 mg by mouth as needed. Historical Provider, MD   therapeutic multivitamin-minerals (THERAGRAN-M) tablet Take 1 tablet by mouth daily.       Historical Provider, MD   aspirin 81 MG chewable tablet Take 81 mg by mouth once a week     Historical Provider, MD       Allergies:  Bee venom, Pcn [penicillins], and Wheat extract    Social History:      The patient currently lives at home    TOBACCO:   reports that he quit smoking about 52 years ago. His smoking use included cigars. He started smoking about 58 years ago. He has a 0.40 pack-year smoking history. He has never used smokeless tobacco.  ETOH:   reports no history of alcohol use. E-cigarette/Vaping       Questions Responses    E-cigarette/Vaping Use     Start Date     Passive Exposure     Quit Date     Counseling Given     Comments               Family History:      Reviewed and negative in regards to presenting illness/complaint. Problem Relation Age of Onset    Other Mother         macular degeneration, DVT     Colon Cancer Father     Other Brother         celiac    High Blood Pressure Brother     Diabetes Brother     Heart Attack Maternal Grandmother     Heart Attack Maternal Grandfather     Stroke Maternal Grandfather     Heart Attack Paternal Grandfather     Other Paternal Uncle         stomach cancer       REVIEW OF SYSTEMS COMPLETED:   Pertinent positives as noted in the HPI. All other systems reviewed and negative. PHYSICAL EXAM PERFORMED:    /78   Pulse 88   Temp 97.7 °F (36.5 °C) (Oral)   Resp 20   Ht 5' 11\" (1.803 m)   Wt 230 lb (104.3 kg)   SpO2 98%   BMI 32.08 kg/m²     General appearance:  No apparent distress, appears stated age and cooperative. HEENT:  Normal cephalic, atraumatic without obvious deformity. Pupils equal, round, and reactive to light. Extra ocular muscles intact. Conjunctivae/corneas clear. Neck: Supple, with full range of motion. No jugular venous distention. Trachea midline. Respiratory:  Normal respiratory effort. Clear to auscultation, bilaterally without Rales/Wheezes/Rhonchi. Cardiovascular: Irregularly irregular rhythm with normal S1/S2 without murmurs, rubs or gallops. Abdomen: Soft, non-tender, non-distended with normal bowel sounds. Musculoskeletal:  No clubbing, cyanosis or edema bilaterally. Full range of motion without deformity. Skin: Skin color, texture, turgor normal.  No rashes or lesions.   Neurologic: Neurovascularly intact without any focal sensory/motor deficits. Cranial nerves: II-XII intact, grossly non-focal.  Psychiatric:  Alert and oriented, thought content appropriate, normal insight  Capillary Refill: Brisk,3 seconds, normal  Peripheral Pulses: +2 palpable, equal bilaterally       Labs:     Recent Labs     12/12/22  1237   WBC 4.2   HGB 14.7   HCT 42.6        Recent Labs     12/12/22  1237      K 4.6      CO2 24   BUN 11   CREATININE 0.7*   CALCIUM 9.6     Recent Labs     12/12/22  1237   AST 46*   ALT 27   BILITOT 0.5   ALKPHOS 110     No results for input(s): INR in the last 72 hours. Recent Labs     12/12/22  1237   TROPONINI <0.01       Urinalysis:    No results found for: Faisal Baptiste, [address], Fortino Professor Nic Azevedo [address], RBCUA, BLOODU, Rohith Lunch, GLUCOSEU    Radiology:     CXR: I have reviewed the CXR with the following interpretation: No acute process  EKG:  I have reviewed the EKG with the following interpretation: A. fib, rate 72, normal intervals, no acute ischemic changes, no prior EKG to compare    XR CHEST PORTABLE   Final Result   No acute cardiopulmonary findings. March 2016 left heart cath, [facility]  coronary angiography.   Results are as follows:   #1 coronary angiography summary normal coronaries in a right   dominant system   A the left main coronary is normal   B the LAD has minor irregularities and no significant stenoses   C the circumflex gives rise to 3 marginals and has minor   irregularities and no significant stenoses   D the right coronary is a dominant vessel which is   angiographically normal   #2 ventriculography demonstrates normal left ventricular   contractility ejection fraction is 55-60   #3 aortic pressure is 118/67 left ventricular pressure is 118/12   there is no gradient on pullback       Consults:    IP CONSULT TO CARDIOLOGY    ASSESSMENT:PLAN:    Active Hospital Problems    Diagnosis Date Noted    New onset a-fib [facility]) [I48.91] 12/12/2022     Priority: Medium Mixed hyperlipidemia [E78.2] 05/22/2018    COPD (chronic obstructive pulmonary disease) (HCC) [J44.9]     Prostate cancer ([facility] [address] Felisa Turner 07/01/2009    Adult celiac disease [K90.0] 07/15/2002     New onset A. fib, asymptomatic, incidental finding post endoscopy  -Asymptomatic, denied any palpitations, chest pain, SOB, lightheadedness, syncope  -A. fib confirmed on EKG, rate controlled so far  Etiology - Likely due to COPD. Denied HTN, hyperthyroidism. No evidence of CAD on [facility] 2016  -Hold off on initiating rate control meds, rates have been ranging from 72-90  -TSH with reflex T4  -2D echocardiogram  -Cardiology consult  -Telemetry monitoring  -Will defer to cardiology regarding ischemia work-up  -Keep n.p.o. in case she needs ALICE with cardioversion  -[id number] Score for Atrial Fibrillation Stroke Risk = 2 (age). Recommending therapeutic anticoagulation to reduce stroke risk of 2.2% per year. initiated anticoagulation with Lovenox, switched to NOAC on discharge, discussed risks of bleeding and patient agreeable with risk profile. HLD-resume atorvastatin    COPD-stable, resume Breo Ellipta and albuterol inhalers    Insomnia-resume melatonin at night    GERD-resume omeprazole daily p.o. Hx of prostate CA -s/p prostatectomy, followed by urology      DVT Prophylaxis: Initiated on Lovenox  Diet: No diet orders on file  Code Status: Full code    PT/OT Eval Status: Ambulatory    Dispo -observation       Xenia Dickens MD    Thank you Pinky Box MD for the opportunity to be involved in this patient's care. If you have any questions or concerns please feel free to contact me at [phone].

## 2022-12-12 NOTE — PROGRESS NOTES
Patient admitted to room 206 from ER. Patient oriented to room, call light, bed rails, phone, lights and bathroom. Patient instructed about the schedule of the day including: vital sign frequency, lab draws, possible tests, frequency of MD and staff rounds, including RN/MD rounding together at bedside, daily weights, and I &O's. Patient instructed about prescribed diet, how to use 8MENU, and television. Telemetry box in place, patient aware of placement and reason. Bed locked, in lowest position, side rails up 2/4, call light within reach. Will continue to monitor.

## 2022-12-12 NOTE — ED PROVIDER NOTES
I independently performed a history and physical on Felix Reyez. All diagnostic, treatment, and disposition decisions were made by myself in conjunction with the advanced practice provider/resident. Labs Reviewed   CBC WITH AUTO DIFFERENTIAL - Abnormal; Notable for the following components:       Result Value    Lymphocytes Absolute 0.8 (*)     All other components within normal limits   COMPREHENSIVE METABOLIC PANEL - Abnormal; Notable for the following components:    Creatinine 0.7 (*)     AST 46 (*)     All other components within normal limits   BRAIN NATRIURETIC PEPTIDE - Abnormal; Notable for the following components:    Pro- (*)     All other components within normal limits   TROPONIN   MAGNESIUM   TSH WITH REFLEX     XR CHEST PORTABLE   Final Result   No acute cardiopulmonary findings. The Ekg interpreted by me shows  atrial fibrillation with a rate of 72  Axis is   Left axis deviation  QTc is  normal  Intervals and Durations are unremarkable. ST Segments: normal  No previous available for comparison    For further details of [facility] emergency department encounter, please see the NAN/resident's documentation. I personally saw the patient and performed a substantive portion of the visit including all aspects of the medical decision making. Briefly, this is a 41-year-old male presenting with concerns for new onset atrial fibrillation. He had a colonoscopy this morning and was discovered to be in A. fib which she has no previous history. He denies any symptoms including chest pain, shortness of breath, lightheadedness or dizziness. Denies any leg pain or leg swelling currently. Work appears reassuring. He has no evidence of fluid overload. He is not tachycardic. However, will be hospitalized for further work-up and treatment of his condition. He is comfortable and in agreement with plan of care.     I personally saw the patient and independently provided 0 minutes of non-concurrent critical care out of the total shared critical care time provided. I, Dr. Silvio Nuno, am the primary clinician of record. 1. New onset atrial fibrillation ([facility] [address]      Comment: Please note this report has been produced using speech recognition software and may contain errors related to that system including errors in grammar, punctuation, and spelling, as well as words and phrases that may be inappropriate. If there are any questions or concerns please feel free to contact the dictating provider for clarification.      Issac Yoo MD  12/12/22 [address] Silvio Nuno MD  12/12/22 8202

## 2022-12-12 NOTE — ED NOTES
Lunch Tray ordered. Pt placed in gown transferred to room 21 placed on heart monitor.  Nurse report to [facility], LPN  73/00/66 [address] Vizcaino, LPN  02/61/56 [id number]

## 2022-12-12 NOTE — CONSULTS
Consult placed    Who:[facility] ADDED TO TREATMENT TEAM  Date:12/12/2022,  Time:6:51 PM        Electronically signed by Coleman Andrade RN on 12/12/2022 at 6:51 PM

## 2022-12-12 NOTE — ED PROVIDER NOTES
[facility] Emergency Department    CHIEF COMPLAINT  Other (Pt had colonoscopy this morning and they noticed he was in a-fib so sent him here. Denies hx of a-fib. Pt denies palpitations, denies dizziness. Pt states he feels fine. Hasn't seen cardiologist since pre-covid.)      SHARED SERVICE VISIT  I have seen and evaluated this patient with my supervising physician, Dr. Karthik Knapp. HISTORY OF PRESENT ILLNESS  Saintclair Lias is a 69 y.o. male who presents to the ED complaining of abnormal EKG. Patient accompanied by wife today for evaluation. Went to have colonoscopy performed today which. To go okay. States that after that appear to be in A. fib. .  States that he has no history of. Was sent over for evaluation. Patient denies any chest pain or shortness of breath. Denies cough congestion. No fevers chills. He has had no headaches, lightheadedness, dizziness or confusion. No leg pain or swelling. Patient again feeling very well. No other complaints, modifying factors or associated symptoms. Nursing notes reviewed.    Past Medical History:   Diagnosis Date    Asthma     CAD (coronary artery disease)     Celiac sprue     COPD (chronic obstructive pulmonary disease) (HCC)     DDD (degenerative disc disease), lumbar     Fatty liver     HTN (hypertension)     Hyperlipidemia     Paget's disease     Prostate cancer ([facility] [address] 07/2009     Past Surgical History:   Procedure Laterality Date    COLONOSCOPY  11/2017    HERNIA REPAIR      PROSTATECTOMY      UPPER GASTROINTESTINAL ENDOSCOPY  11/19/2012     Family History   Problem Relation Age of Onset    Other Mother         macular degeneration, DVT     Colon Cancer Father     Other Brother         celiac    High Blood Pressure Brother     Diabetes Brother     Heart Attack Maternal Grandmother     Heart Attack Maternal Grandfather     Stroke Maternal Grandfather     Heart Attack Paternal Grandfather     Other Paternal Uncle stomach cancer     Social History     Socioeconomic History    Marital status:      Spouse name: Not on file    Number of children: Not on file    Years of education: Not on file    Highest education level: Not on file   Occupational History    Occupation: [employer]   Tobacco Use    Smoking status: Former     Packs/day: 0.10     Years: 4.00     Pack years: 0.40     Types: Cigars, Cigarettes     Start date: 1964     Quit date: 1970     Years since quittin.9    Smokeless tobacco: Never   Substance and Sexual Activity    Alcohol use: No    Drug use: No    Sexual activity: Yes   Other Topics Concern    Not on file   Social History Narrative    Not on file     Social Determinants of Health     Financial Resource Strain: Low Risk     Difficulty of Paying Living Expenses: Not hard at all   Food Insecurity: No Food Insecurity    Worried About Running Out of Food in the Last Year: Never true    Ran Out of Food in the Last Year: Never true   Transportation Needs: Not on file   Physical Activity: Inactive    Days of Exercise per Week: 0 days    Minutes of Exercise per Session: 0 min   Stress: Not on file   Social Connections: Not on file   Intimate Partner Violence: Not on file   Housing Stability: Not on file     No current facility-administered medications for this encounter. Current Outpatient Medications   Medication Sig Dispense Refill    Fluticasone furoate-vilanterol (BREO ELLIPTA) 200-25 MCG/INH AEPB inhaler INHALE ONE PUFF BY MOUTH DAILY 3 each 3    omeprazole (PRILOSEC) 20 MG delayed release capsule Take 1 capsule by mouth in the morning.  90 capsule 3    albuterol sulfate HFA (PROVENTIL HFA) 108 (90 Base) MCG/ACT inhaler Inhale 2 puffs into the lungs every 6 hours as needed for Wheezing 1 each 3    atorvastatin (LIPITOR) 20 MG tablet TAKE ONE TABLET BY MOUTH EVERY DAY 90 tablet 3    Glucos-MSM-C-Xn-Plaavy-Qkiulg (GLUCOSAMINE MSM COMPLEX PO) Take by mouth daily      polyethylene glycol (GLYCOLAX) 17 g packet Take 17 g by mouth daily as needed for Constipation      B Complex-C (SUPER B COMPLEX PO) Take by mouth daily      Cholecalciferol (VITAMIN D3) 125 MCG (5000 UT) TABS Take by mouth daily      Omega 3-6-9 Fatty Acids (OMEGA-3-6-9 PO) Take by mouth daily      Coenzyme Q10 (CO Q-10) 200 MG CAPS Take 200 mg by mouth daily      Melatonin 10 MG CAPS Take 10 mg by mouth nightly      Calcium-Magnesium-Vitamin D (CALCIUM MAGNESIUM PO) Take 1 tablet by mouth Daily. Dose is 1200/400       acetaminophen (TYLENOL) 325 MG tablet Take 1,000 mg by mouth as needed. therapeutic multivitamin-minerals (THERAGRAN-M) tablet Take 1 tablet by mouth daily. aspirin 81 MG chewable tablet Take 81 mg by mouth once a week        Allergies   Allergen Reactions    Bee Venom     Pcn [Penicillins] Swelling    Wheat Extract Other (See Comments)     Celiac disease       REVIEW OF SYSTEMS  10 systems reviewed, pertinent positives per HPI otherwise noted to be negative    PHYSICAL EXAM  BP (!) 142/91   Pulse 72   Temp 97.7 °F (36.5 °C) (Oral)   Resp 18   Ht 5' 11\" (1.803 m)   Wt 230 lb (104.3 kg)   SpO2 98%   BMI 32.08 kg/m²   GENERAL APPEARANCE: Awake and alert. Cooperative. No acute distress. HEAD: Normocephalic. Atraumatic. EYES: PERRL. EOM's grossly intact. ENT: Mucous membranes are moist.   NECK: Supple. No JVD. No tracheal tenderness or deviation. No crepitus. HEART: RRR. No murmurs. No chest wall tenderness. LUNGS: Respirations unlabored. CTAB. Good air exchange. Speaking comfortably in full sentences. No wheezing, rhonchi, rales. ABDOMEN: Soft. Non-distended. Non-tender. No guarding or rebound. No midline pulsatile mass. EXTREMITIES: No peripheral edema. No unilateral calf pain, redness or swelling. Moves all extremities equally. All extremities neurovascularly intact. SKIN: Warm and dry. No acute rashes. NEUROLOGICAL: Alert and oriented. CN's 2-12 intact. No gross facial drooping.  Strength 5/5, sensation intact. PSYCHIATRIC: Normal mood and affect. RADIOLOGY  XR CHEST PORTABLE    Result Date: 12/12/2022  EXAMINATION: ONE XRAY VIEW OF THE CHEST 12/12/2022 1:05 pm COMPARISON: Chest x-ray dated 23 November 2012 HISTORY: ORDERING SYSTEM PROVIDED HISTORY: aafib TECHNOLOGIST PROVIDED HISTORY: Reason for exam:->aafib Reason for Exam: a fib FINDINGS: Elevation of the right hemidiaphragm. Normal cardiomediastinal silhouette. No acute airspace infiltrate. No pneumothorax or pleural effusion     No acute cardiopulmonary findings. ED COURSE  Pain control was not required while here in the emergency department. Triage vitals stable. EKG appeared consistent with A. fib rate controlled at 72. CBC without leukocytosis or anemia. CMP was unremarkable. Troponin negative. BNP of approximately 905. Magnesium 2.1.  TSH pending. Stable portable chest x-ray. Given new onset A. fib we are at this time recommending admission. Case discussed with hospital medicine and orders placed. A discussion was had with Mr. Gwen Armstrong regarding abnormal EKG, ED findings and recommendations for admission. Risk management discussed and shared decision making had with patient and/or surrogate. All questions were answered. Patient in agreement.     MDM  Results for orders placed or performed during the hospital encounter of 12/12/22   CBC with Auto Differential   Result Value Ref Range    WBC 4.2 4.0 - 11.0 K/uL    RBC 4.37 4.20 - 5.90 M/uL    Hemoglobin 14.7 13.5 - 17.5 g/dL    Hematocrit 42.6 40.5 - 52.5 %    MCV 97.4 80.0 - 100.0 fL    MCH 33.6 26.0 - 34.0 pg    MCHC 34.5 31.0 - 36.0 g/dL    RDW 13.1 12.4 - 15.4 %    Platelets 027 065 - 762 K/uL    MPV 8.2 5.0 - 10.5 fL    Neutrophils % 69.2 %    Lymphocytes % 19.2 %    Monocytes % 8.1 %    Eosinophils % 2.3 %    Basophils % 1.2 %    Neutrophils Absolute 2.9 1.7 - 7.7 K/uL    Lymphocytes Absolute 0.8 (L) 1.0 - 5.1 K/uL    Monocytes Absolute 0.3 0.0 - 1.3 K/uL Eosinophils Absolute 0.1 0.0 - 0.6 K/uL    Basophils Absolute 0.0 0.0 - 0.2 K/uL   Comprehensive Metabolic Panel   Result Value Ref Range    Sodium 136 136 - 145 mmol/L    Potassium 4.6 3.5 - 5.1 mmol/L    Chloride 102 99 - 110 mmol/L    CO2 24 21 - 32 mmol/L    Anion Gap 10 3 - 16    Glucose 99 70 - 99 mg/dL    BUN 11 7 - 20 mg/dL    Creatinine 0.7 (L) 0.8 - 1.3 mg/dL    Est, Glom Filt Rate >60 >60    Calcium 9.6 8.3 - 10.6 mg/dL    Total Protein 7.6 6.4 - 8.2 g/dL    Albumin 4.4 3.4 - 5.0 g/dL    Albumin/Globulin Ratio 1.4 1.1 - 2.2    Total Bilirubin 0.5 0.0 - 1.0 mg/dL    Alkaline Phosphatase 110 40 - 129 U/L    ALT 27 10 - 40 U/L    AST 46 (H) 15 - 37 U/L   Troponin   Result Value Ref Range    Troponin <0.01 <0.01 ng/mL   Brain Natriuretic Peptide   Result Value Ref Range    Pro- (H) 0 - 449 pg/mL   Magnesium   Result Value Ref Range    Magnesium 2.10 1.80 - 2.40 mg/dL   EKG 12 Lead   Result Value Ref Range    Ventricular Rate 72 BPM    Atrial Rate 288 BPM    QRS Duration 88 ms    Q-T Interval 402 ms    QTc Calculation (Bazett) 440 ms    R Axis -11 degrees    T Axis 33 degrees    Diagnosis       Atrial fibrillationAbnormal ECGConfirmed by Kaz Carrero MD, [facility] ([id number]) on 12/12/2022 2:09:28 PM     I spoke with Casey Gao and Rashad Atkins. We thoroughly discussed the history, physical exam, laboratory and imaging studies, as well as, emergency department course. Based upon that discussion, we've decided to admit Danuta Horton to the hospital for further observation, evaluation, and treatment. Final Impression  1. New onset atrial fibrillation (HCC)      Blood pressure (!) 146/83, pulse 75, temperature 97.7 °F (36.5 °C), temperature source Oral, resp. rate 17, height 5' 11\" (1.803 m), weight 230 lb (104.3 kg), SpO2 99 %. DISPOSITION  Patient was admitted to the hospital in stable condition.          Tony Robertsma  12/12/22 [id number]

## 2022-12-12 NOTE — TELEPHONE ENCOUNTER
Pt at [facility] for his colonoscopy and the nurse told his wife to call to let us know that his heart monitor shows he is in A-fib. Spoke with Dr Jane Dia and since pt has no history of A-fib and this is new pt should be evaluated in the ER. Camille Fuller informed and I told her to get a copy of his EKG to take with them to [facility]. She voiced understanding.

## 2022-12-12 NOTE — ED NOTES
Pt had Colonoscopy done this morning during procedure noticed pt in AFIB. Pt instructed to come to ED for further evaluation/pt denies pain at this time. Pt blood pressure elevated per family member pt has never had high blood pressure/does not take meds for high blood pressure.      Diego Freeman LPN  43/51/51 [id number]

## 2022-12-13 ENCOUNTER — TELEPHONE (OUTPATIENT)
Dept: CARDIOLOGY | Age: 77
End: 2022-12-13

## 2022-12-13 VITALS
HEART RATE: 72 BPM | BODY MASS INDEX: 31.48 KG/M2 | DIASTOLIC BLOOD PRESSURE: 86 MMHG | RESPIRATION RATE: 16 BRPM | SYSTOLIC BLOOD PRESSURE: 140 MMHG | HEIGHT: 71 IN | OXYGEN SATURATION: 100 % | WEIGHT: 224.9 LBS | TEMPERATURE: 97.9 F

## 2022-12-13 LAB
EKG ATRIAL RATE: 227 BPM
EKG DIAGNOSIS: NORMAL
EKG Q-T INTERVAL: 382 MS
EKG QRS DURATION: 84 MS
EKG QTC CALCULATION (BAZETT): 448 MS
EKG R AXIS: -24 DEGREES
EKG T AXIS: -23 DEGREES
EKG VENTRICULAR RATE: 83 BPM
INR BLD: 1.08 (ref 0.87–1.14)
PROTHROMBIN TIME: 13.9 SEC (ref 11.7–14.5)

## 2022-12-13 PROCEDURE — 36415 COLL VENOUS BLD VENIPUNCTURE: CPT

## 2022-12-13 PROCEDURE — G0378 HOSPITAL OBSERVATION PER HR: HCPCS

## 2022-12-13 PROCEDURE — 93005 ELECTROCARDIOGRAM TRACING: CPT | Performed by: INTERNAL MEDICINE

## 2022-12-13 PROCEDURE — 6370000000 HC RX 637 (ALT 250 FOR IP): Performed by: INTERNAL MEDICINE

## 2022-12-13 PROCEDURE — 2580000003 HC RX 258: Performed by: INTERNAL MEDICINE

## 2022-12-13 PROCEDURE — 99205 OFFICE O/P NEW HI 60 MIN: CPT | Performed by: INTERNAL MEDICINE

## 2022-12-13 PROCEDURE — 85610 PROTHROMBIN TIME: CPT

## 2022-12-13 PROCEDURE — 93010 ELECTROCARDIOGRAM REPORT: CPT | Performed by: INTERNAL MEDICINE

## 2022-12-13 RX ADMIN — PANTOPRAZOLE SODIUM 20 MG: 20 TABLET, DELAYED RELEASE ORAL at 06:26

## 2022-12-13 RX ADMIN — Medication 10 ML: at 09:02

## 2022-12-13 RX ADMIN — APIXABAN 5 MG: 5 TABLET, FILM COATED ORAL at 09:02

## 2022-12-13 RX ADMIN — Medication 1 TABLET: at 09:02

## 2022-12-13 NOTE — PROGRESS NOTES
Chantel Garland with cardiology notified RN that Dr. Magdalena Suarez is okay with patient discharging with an outpatient echo scheduled. Notified Dr. Alexis Gonzalez. Outpatient echo information & follow-up appointment with Dr. Magdalena Suarez listed on AVS. 28 day event monitor applied to patient and education given.

## 2022-12-13 NOTE — RT PROTOCOL NOTE
RT Nebulizer Bronchodilator Protocol Note    There is a bronchodilator order in the chart from a provider indicating to follow the RT Bronchodilator Protocol and there is an Initiate RT Bronchodilator Protocol order as well (see protocol at bottom of note). CXR Findings:  XR CHEST PORTABLE    Result Date: 12/12/2022  No acute cardiopulmonary findings. The findings from the last RT Protocol Assessment were as follows:  Smoking: Chronic pulmonary disease  Respiratory Pattern: Regular pattern and RR 12-20 bpm  Breath Sounds: Clear breath sounds  Cough: Strong, spontaneous, non-productive  Indication for Bronchodilator Therapy: On home bronchodilators  Bronchodilator Assessment Score: 2    Aerosolized bronchodilator medication orders have been revised according to the RT Nebulizer Bronchodilator Protocol below. Respiratory Therapist to perform RT Therapy Protocol Assessment initially then follow the protocol. Repeat RT Therapy Protocol Assessment PRN for score 0-3 or on second treatment, BID, and PRN for scores above 3. No Indications - adjust the frequency to every 6 hours PRN wheezing or bronchospasm, if no treatments needed after 48 hours then discontinue using Per Protocol order mode. If indication present, adjust the RT bronchodilator orders based on the Bronchodilator Assessment Score as indicated below. If a patient is on this medication at home then do not decrease Frequency below that used at home. 0-3 - enter or revise RT bronchodilator order(s) to equivalent RT Bronchodilator order with Frequency of every 4 hours PRN for wheezing or increased work of breathing using Per Protocol order mode. 4-6 - enter or revise RT Bronchodilator order(s) to two equivalent RT bronchodilator orders with one order with BID Frequency and one order with Frequency of every 4 hours PRN wheezing or increased work of breathing using Per Protocol order mode.          7-10 - enter or revise RT Bronchodilator order(s) to two equivalent RT bronchodilator orders with one order with TID Frequency and one order with Frequency of every 4 hours PRN wheezing or increased work of breathing using Per Protocol order mode. 11-13 - enter or revise RT Bronchodilator order(s) to one equivalent RT bronchodilator order with QID Frequency and an Albuterol order with Frequency of every 4 hours PRN wheezing or increased work of breathing using Per Protocol order mode. Greater than 13 - enter or revise RT Bronchodilator order(s) to one equivalent RT bronchodilator order with every 4 hours Frequency and an Albuterol order with Frequency of every 2 hours PRN wheezing or increased work of breathing using Per Protocol order mode. RT to enter RT Home Evaluation for COPD & MDI Assessment order using Per Protocol order mode.     Electronically signed by Jayne Self RCP on 12/12/2022 at 8:15 PM

## 2022-12-13 NOTE — DISCHARGE INSTRUCTIONS
Your procedure has been scheduled for:    Date: January 17th arrive at 1:15pm for 1:30pm echocardiogram.       Report to:  [facility] main lobby  In case of questions, or to reschedule, please call [phone] ([phone]). FOLLOW-UP APPOINTMENTS    [facility] OFFICE - Follow-up appointment on April 11th at 2:45pm with Dr. Moira Mon, electrophysiologist, [facility]. [facility],  [facility], [address] [place] [address] [address], [address] [facility], [address] [facility]. Office #: [phone]. If you are unable to make this appointment, please call to reschedule. Directions to [place] [address] towards [place]. [address] [facility] exit. Right off exit. Cross over [employer]. Right on [address]. Left into hospital. Follow the signs to the emergency room ( turn left toward the Emergency room). Go right at the first stop sign. Just past the Emergency room at the second stop sign turn right and go up the ramp and park on the top level if possible. Go in the glass doors of the [facility] on the top level of the garage [address]. As soon as you get in the door turn left and our office is the one with the glass doors. [employer] MONITOR PATCH PATIENT INSTRUCTIONS    Remove Patch in 1 week from application. Recalibrate and apply next patch. Call  [employer] CUSTOMER SUPPORT: [phone] and they will assist if needed. o PIN: [id number]    o Keep the phone with you as much as possible. Be sure to charge the phone overnight and when the battery gets low. If the phone dies completely, be sure to restart the phone and enter the PIN number to confirm the patch is connected to your phone.    o If you are away from the phone for more than 8 hours, please stay within a 30 foot range of the phone for 3 hours to ensure your data fully uploads. o If you go somewhere with no cellphone service, still keep the phone with you and charged. Your data will upload when you reach cellphone service.  You can stay outside of cellphone range for up to 10 days. o This phone cannot make calls, take pictures, etc. It is solely for medical use and will only Bluetooth connect to your patch. · Patch Instructions:    o Wait to exercise or shower for 30 minutes after application. o Shower with the water stream to your back and avoid putting the patch directly under the water stream.    o Do not bathe, swim or fully submerge the patch.    o If the patch starts to lift off after showering or sweating, dry the patch with a towel and allow the adhesive to dry. It should re-adhere to your skin. If it continues to lift, use the adhesive overlay to re-adhere the patch. Video instructions for the adhesive overlay are on the phone under the Menu Tab in the right hand corner - [address]    o One patch lasts for up to 7 days. If you need to wear an additional patch or replace a patch, utilize the included application instructions or the video instructions - Menu - Help - [employer] Application    · End of Wear - Returning the Phone    o Osito Mukherjee are responsible for returning the phone. You will be financially responsible for an unreturned phone. o Discard the used patch in the trash    o Return the phone,  and any unopened additional patches or adhesives in the pre-paid mailing pouch or drop off at the office. o Drop the  at [address] [place] [address] or box or schedule a home  by calling    ups - [phone]. Keep the tracking number for your records.     CONTACT [employer] CUSTOMER SUPPORT:  [phone]

## 2022-12-13 NOTE — TELEPHONE ENCOUNTER
Monitor company [employer]  Length of monitor 28 days  Monitor ordered by Dr. Evelyne Amaya number [id number]  Monitor given to Dahiana Ferrer RN   Nurse to apply at the time of discharge.

## 2022-12-13 NOTE — CONSULTS
[address] [address]                                  CONSULTATION    PATIENT NAME: Kal Tejeda                      :        1945  MED REC NO:   [id number]                          ROOM:       0206  ACCOUNT NO:   [de-identified]                           ADMIT DATE: 2022  PROVIDER:     Chantale Ambrocio MD    CONSULT DATE:  2022    CARDIAC ELECTROPHYSIOLOGY CONSULTATION    REASON FOR CONSULTATION:  Atrial fibrillation. HISTORY OF PRESENT ILLNESS:  The patient is a 60-year-old man with  history of hypertension, hyperlipidemia, and COPD, who is admitted for  recently identified atrial fibrillation. The patient has no history of  cardiac arrhythmia. He underwent cardiac catheterization in 2016,  which demonstrated only minimal luminal irregularities and no fixed  obstructive coronary artery disease. Left ventricular function at that  time was normal.  The patient presented for elective colonoscopy on  2022 and an irregular heart beat was identified. He was referred  to the emergency department where ECG demonstrates typical appearing  atrial fibrillation with heart rate at 72 beats per minute. His most  recent ECG from 2019 demonstrated sinus rhythm. The patient is  unaware of palpitations at rest.  He does describe some palpitations  with strenuous physical activity, but this preceded at 2019 when he was  known to be in sinus rhythm. He still maintains an active lifestyle. PAST MEDICAL HISTORY:  1. Hypertension. 2.  Hyperlipidemia. 3.  COPD. 4.  Prostate cancer. MEDICATIONS:  At the time of admission include aspirin 81 mg p.o. daily,  Lipitor 20 mg p.o. daily, melatonin 10 mg p.o. at bedtime, Prilosec 20  mg p.o. daily and multivitamins. ALLERGIES:  Include BEE VENOM, PENICILLIN and WHEAT EXTRACT.     SOCIAL HISTORY:  The patient is a former smoker having stopped smoking  more than 50 years ago. He does not consume alcohol at this time. FAMILY HISTORY:  Remarkable for colon cancer in the father. History of  diabetes and hypertension in one male sibling. REVIEW OF SYSTEMS:  The patient reports no recent change in appetite or  change in weight. He has not had recent fever or chills. He does not  describe chest pain. He has not had near syncope or syncope. He does  not describe palpitations at rest.  His functional status is  well-preserved. All other components of 14-system review are negative. PHYSICAL EXAMINATION:  VITAL SIGNS:  Blood pressure is 143/74, heart rate is 76 beats per  minute and regular. The patient is afebrile. GENERAL:  He is awake, alert, oriented and in no discomfort. HEENT:  Exam demonstrates normocephalic and atraumatic head. There is  no scleral icterus. Pupils are round and reactive. NECK:  Supple without thyromegaly. There is no cervical  lymphadenopathy. LUNGS:  Clear to auscultation. CARDIOVASCULAR:  Exam reveals an irregular rhythm. The apical impulse  is discrete. S1 and S2 are normal.  There is no audible murmur or  gallop. The jugular venous pressure is about 4 cm of water. ABDOMEN:  Mildly obese, soft and nontender. EXTREMITIES:  Demonstrate no pitting, pretibial or dependent edema. There is no cyanosis. There is no evidence for chronic venous stasis. SKIN:  Otherwise warm and dry without skin rash. DIAGNOSTIC DATA:  A 12-lead ECG from 12/12/2022 demonstrates atrial  fibrillation with an average ventricular rate of 72 beats per minute. Minor ST-segment abnormalities are identified. IMPRESSIONS:  1. Atrial fibrillation of unknown onset. 2.  COPD. 3.  Hypertension. 4.  Hyperlipidemia. 5.  Prostate cancer. The patient presents following colonoscopy with recently identified  atrial fibrillation. The last known sinus rhythm ECG is from 02/2019.    He does describe some palpitations with activity, but this was also  present in sinus rhythm. His heart rate is well-controlled in the  absence of any negative dromotropic agents, in first some element of AV  block. The normal heart rate is likely the explanation for the paucity  of symptoms. He has a CHADS-VASc score of 3 and will require full dose  anticoagulation. Echocardiography will be obtained to exclude  significant structural heart disease. We will obtain 28-day ambulatory  ECG monitoring to determine whether the atrial fibrillation is  persistent or paroxysmal and to provide some information about heart  rate control. RECOMMENDATIONS:  1. Switch from low molecular heparin to apixaban. 2.  2-D echo. 3.  Would avoid negative dromotropic agents such as digoxin and  non-dihydropyridine calcium channel blockers at this time. 4.  28-day ambulatory ECG monitor if echo is normal.  5.  Follow up with EP in 2 to 3 months. Thanks for the opportunity to assist in the care of the patient. Please  contact me, if you have any questions regarding his evaluation.         Felix Hatch MD    D: 12/13/2022 10:19:16       T: 12/13/2022 10:23:15     [email or web address]  Job#: [id number]     Doc#: [id number]    CC:

## 2022-12-13 NOTE — PROGRESS NOTES
Pt ok for discharge per MD. Discharge instructions and script given. IV removed. No questions or concerns at this time. Transported by wheelchair to personal car.

## 2022-12-14 ENCOUNTER — CARE COORDINATION (OUTPATIENT)
Dept: CASE MANAGEMENT | Age: 77
End: 2022-12-14

## 2022-12-14 DIAGNOSIS — I48.91 NEW ONSET A-FIB (HCC): Primary | ICD-10-CM

## 2022-12-14 PROCEDURE — 1111F DSCHRG MED/CURRENT MED MERGE: CPT | Performed by: FAMILY MEDICINE

## 2022-12-14 NOTE — CARE COORDINATION
[facility] Care Transitions Initial Follow Up Call    Call within 2 business days of discharge: Yes    Patient: Logan Machado Patient : 1945   MRN: [id number]  Reason for Admission: new onset atrial fib d/c w/ cardiac monitor, PMH COPD,   Discharge Date: 22 RARS: No data recorded    Last Discharge  [address]       Date Complaint Diagnosis Description Type Department Provider    22 Other New onset atrial fibrillation ([facility] [address] . .. ED to Hosp-Admission (Discharged) (ADMITTED) Timmy Lopez MD; Tarah Bee. .. Was this an external facility discharge? No Discharge Facility:     Challenges to be reviewed by the provider   Additional needs identified to be addressed with provider: Yes  Patient discharged from hospital yesterday. Please assist patient with scheduling post hospitalization visit if needed. Method of communication with provider: chart routing. Attempted to reach patient via phone for initial post hospital transition call. VM left stating purpose of call along with my contact information requesting a return call. Patient received voicemail and returned call and verified . Patient pleasant and agreeable to transition call. Stated he is feeling really well since d/c from hospital. Slept well last night and good appetite this morning. Confirmed that he is wearing cardiac monitor as instructed. AVS available and medications discussed. Patient stated that his wife is a retired critical care nurse and assist with medications. Confirmed that he takes all medication as directed. Follow up visit with cardio reviewed and noted in system. Post hospitalization with PCP not seen in system. Message routed to provider to assist with scheduling for post hospitalization visit. Denies any acute needs at present time. Agreeable to f/u calls. Educated on the use of urgent care or physicians 24 hr access line if assistance is needed after hours.       Care Transition Nurse reviewed discharge instructions with patient who verbalized understanding. The patient was given an opportunity to ask questions and does not have any further questions or concerns at this time. Were discharge instructions available to patient? Yes. Reviewed appropriate site of care based on symptoms and resources available to patient including: PCP  Specialist  When to call 911. The patient agrees to contact the PCP office for questions related to their healthcare. Advance Care Planning:   Does patient have an Advance Directive: reviewed and current. Medication reconciliation was performed with patient, who verbalizes understanding of administration of home medications. Medications reviewed, 1111F entered: yes    Was patient discharged with a pulse oximeter? no    Non-face-to-face services provided:  Obtained and reviewed discharge summary and/or continuity of care documents    Offered patient enrollment in the Remote Patient Monitoring (RPM) program for in-home monitoring: Yes, but did not enroll at this time. Patient declined- stated that he is not tech smart and did not want to enroll. Patient had to return his smart phone and return to flip cell phone    Care Transitions 24 Hour Call    Care Transitions Interventions         Follow Up  Future Appointments   Date Time Provider Coretta Basurto   1/17/2023  2:30 PM [facility] ECHO [address] [facility]   2/15/2023  8:20 AM MD KAMAR Ozuna [facility]   4/11/2023  2:45 PM Thee Marlow MD [facility]       Care Transition Nurse provided contact information. Plan for follow-up call in 5-7 days based on severity of symptoms and risk factors.   Plan for next call: self management-     Sendy Balbuena RN

## 2022-12-15 ENCOUNTER — TELEPHONE (OUTPATIENT)
Dept: CARDIOLOGY CLINIC | Age: 77
End: 2022-12-15

## 2022-12-15 NOTE — TELEPHONE ENCOUNTER
It appears that the patient does have a history of hyperlipidemia, no CAD history. Please advise on concurrent use of Eliquis and Aspirin 81 mg daily.

## 2022-12-15 NOTE — TELEPHONE ENCOUNTER
Pts wife stated that pt was just started on Eliquis for afib. Pts wife wanted to verify if pt should continue asprin or discontinue the asprin. Pt can be reached at [phone] or [phone]. Please advise.

## 2022-12-19 NOTE — TELEPHONE ENCOUNTER
Lisbeth Welch MD  You 4 days ago     [id number] [facility] discontinue the aspirin. Attempted to call the patient to relay [facility] message. GENA.

## 2022-12-21 ENCOUNTER — CARE COORDINATION (OUTPATIENT)
Dept: CASE MANAGEMENT | Age: 77
End: 2022-12-21

## 2022-12-28 ENCOUNTER — CARE COORDINATION (OUTPATIENT)
Dept: CASE MANAGEMENT | Age: 77
End: 2022-12-28

## 2022-12-28 NOTE — CARE COORDINATION
[facility] Care Transitions Follow Up Call    Care Transition Nurse contacted the patient by telephone to follow up after admission. Verified name and  with patient as identifiers. Patient: Leeann Dewey  Patient : 1945   MRN: [id number]  Reason for Admission: new onset atrial fib d/c w/ cardiac monitor, PMH COPD,   Discharge Date: 22 RARS: No data recorded    Needs to be reviewed by the provider   Additional needs identified to be addressed with provider: No  none             Method of communication with provider: none. Patient answered call and verified . Patient pleasant and agreeable to transition call. Patient has picked up his dog and returned to daily dog walks starting today. Stated his daughter took care of his dog during the super cold/blizzard days. Patient is feeling good and continues to wear cardiac monitor for this week and next week. Denied any CP, SOB, or dizziness. Stated that his vital signs have been good. Denied and problems with appetite, nausea or vomiting. Denies any acute needs at present time. Agreeable to f/u calls. Educated on the use of urgent care or physicians 24 hr access line if assistance is needed after hours. Addressed changes since last contact:  none  Discussed follow-up appointments. If no appointment was previously scheduled, appointment scheduling offered: Yes. Is follow up appointment scheduled within 7 days of discharge? no    Follow Up  Future Appointments   Date Time Provider Coretta Basurto   2022  9:40 AM MD KAMAR Vergara [facility]   2023  2:30 PM [facility] ECHO ROOM [facility] AND CAR [place] Ke[place]a   2/15/2023  8:20 AM MD KAMAR Vergara [facility]   2023  2:45 PM MD Hong Parker [facility]     Non-[facility] follow up appointment(s):     Care Transition Nurse reviewed medical action plan with patient and discussed any barriers to care and/or understanding of plan of care after discharge.  Discussed appropriate site of care based on symptoms and resources available to patient including: PCP  Specialist. The patient agrees to contact the PCP office for questions related to their healthcare. Advance Care Planning:   reviewed and current. Patients top risk factors for readmission: functional physical ability  Interventions to address risk factors: Education of patient/family/caregiver/guardian to support self-management-        Care Transitions Subsequent and Final Call    Subsequent and Final Calls  Do you have any ongoing symptoms?: No  Have your medications changed?: No  Do you have any questions related to your medications?: No  Do you currently have any active services?: No  Do you have any needs or concerns that I can assist you with?: No  Identified Barriers: None  Care Transitions Interventions  Other Interventions:             Care Transition Nurse provided contact information for future needs. Plan for follow-up call in 7-10 days based on severity of symptoms and risk factors.   Plan for next call: self management-     Davie Locke RN

## 2022-12-30 ENCOUNTER — OFFICE VISIT (OUTPATIENT)
Dept: FAMILY MEDICINE CLINIC | Age: 77
End: 2022-12-30

## 2022-12-30 VITALS
WEIGHT: 234 LBS | HEART RATE: 97 BPM | OXYGEN SATURATION: 97 % | BODY MASS INDEX: 32.76 KG/M2 | HEIGHT: 71 IN | SYSTOLIC BLOOD PRESSURE: 122 MMHG | DIASTOLIC BLOOD PRESSURE: 82 MMHG

## 2022-12-30 DIAGNOSIS — I48.0 PAROXYSMAL ATRIAL FIBRILLATION (HCC): Primary | ICD-10-CM

## 2022-12-30 DIAGNOSIS — M25.562 CHRONIC PAIN OF LEFT KNEE: ICD-10-CM

## 2022-12-30 DIAGNOSIS — G89.29 CHRONIC PAIN OF LEFT KNEE: ICD-10-CM

## 2022-12-30 DIAGNOSIS — J44.9 CHRONIC OBSTRUCTIVE PULMONARY DISEASE, UNSPECIFIED COPD TYPE (HCC): ICD-10-CM

## 2022-12-30 ASSESSMENT — ENCOUNTER SYMPTOMS: SHORTNESS OF BREATH: 0

## 2022-12-30 NOTE — PROGRESS NOTES
Socioeconomic History    Marital status:      Spouse name: Not on file    Number of children: Not on file    Years of education: Not on file    Highest education level: Not on file   Occupational History    Occupation: [employer]   Tobacco Use    Smoking status: Former     Packs/day: 0.10     Years: 4.00     Pack years: 0.40     Types: Cigars, Cigarettes     Start date: 1964     Quit date: 1970     Years since quittin.0    Smokeless tobacco: Never   Substance and Sexual Activity    Alcohol use: No    Drug use: No    Sexual activity: Yes   Other Topics Concern    Not on file   Social History Narrative    Not on file     Social Determinants of Health     Financial Resource Strain: Low Risk     Difficulty of Paying Living Expenses: Not hard at all   Food Insecurity: No Food Insecurity    Worried About Running Out of Food in the Last Year: Never true    [address] [place] [address] Place of Food in the Last Year: Never true   Transportation Needs: Not on file   Physical Activity: Inactive    Days of Exercise per Week: 0 days    Minutes of Exercise per Session: 0 min   Stress: Not on file   Social Connections: Not on file   Intimate Partner Violence: Not on file   Housing Stability: Not on file       Prior to Visit Medications    Medication Sig Taking? Authorizing Provider   apixaban (ELIQUIS) 5 MG TABS tablet Take 1 tablet by mouth 2 times daily Yes Author MD Lore   Fluticasone furoate-vilanterol (BREO ELLIPTA) 200-25 MCG/INH AEPB inhaler INHALE ONE PUFF BY MOUTH DAILY Yes Author MD Lore   omeprazole (PRILOSEC) 20 MG delayed release capsule Take 1 capsule by mouth in the morning.  Yes Author MD Lore   albuterol sulfate HFA (PROVENTIL HFA) 108 (90 Base) MCG/ACT inhaler Inhale 2 puffs into the lungs every 6 hours as needed for Wheezing Yes Author MD Lore   atorvastatin (LIPITOR) 20 MG tablet TAKE ONE TABLET BY MOUTH EVERY DAY Yes Author MD Lore   Glucos-MSM-C-Pa-Kzrcds-Koevgu (GLUCOSAMINE MSM COMPLEX PO) Take by mouth daily Yes Historical Provider, MD   polyethylene glycol (GLYCOLAX) 17 g packet Take 17 g by mouth daily as needed for Constipation Yes Historical Provider, MD   B Complex-C (SUPER B COMPLEX PO) Take by mouth daily Yes Historical Provider, MD   Cholecalciferol (VITAMIN D3) 125 MCG (5000 UT) TABS Take by mouth daily Yes Historical Provider, MD   Omega 3-6-9 Fatty Acids (OMEGA-3-6-9 PO) Take by mouth daily Yes Historical Provider, MD   Coenzyme Q10 (CO Q-10) 200 MG CAPS Take 200 mg by mouth daily Yes Historical Provider, MD   Melatonin 10 MG CAPS Take 10 mg by mouth nightly Yes Historical Provider, MD   Calcium-Magnesium-Vitamin D (CALCIUM MAGNESIUM PO) Take 1 tablet by mouth Daily. Dose is 1200/400  Yes Historical Provider, MD   acetaminophen (TYLENOL) 325 MG tablet Take 1,000 mg by mouth as needed. Yes Historical Provider, MD   therapeutic multivitamin-minerals (THERAGRAN-M) tablet Take 1 tablet by mouth daily. Yes Historical Provider, MD       Allergies   Allergen Reactions    Bee Venom     Pcn [Penicillins] Swelling    Wheat Extract Other (See Comments)     Celiac disease       OBJECTIVE:    /82   Pulse 97   Ht 5' 11\" (1.803 m)   Wt 234 lb (106.1 kg)   SpO2 97%   BMI 32.64 kg/m²     BP Readings from Last 2 Encounters:   12/30/22 122/82   12/13/22 (!) 140/86       Wt Readings from Last 3 Encounters:   12/30/22 234 lb (106.1 kg)   12/12/22 224 lb 14.4 oz (102 kg)   08/15/22 228 lb (103.4 kg)       Physical Exam  Constitutional:       Appearance: He is well-developed. HENT:      Head: Normocephalic and atraumatic. Eyes:      Conjunctiva/sclera: Conjunctivae normal.   Neck:      Trachea: No tracheal deviation. Cardiovascular:      Rate and Rhythm: Normal rate. Rhythm irregular. Heart sounds: No murmur heard. No friction rub. No gallop. Pulmonary:      Effort: Pulmonary effort is normal.      Breath sounds: Normal breath sounds. Abdominal:      Palpations: Abdomen is soft. Tenderness: There is no abdominal tenderness. Musculoskeletal:      Right lower leg: No edema. Left lower leg: No edema. Skin:     General: Skin is warm and dry. Neurological:      General: No focal deficit present. Mental Status: He is alert and oriented to person, place, and time. Psychiatric:         Mood and Affect: Mood normal.         Behavior: Behavior normal.         ASSESSMENT/PLAN:    1. Paroxysmal atrial fibrillation [facility])  Reviewed cardiology notes. Currently has a monitor in place. Plans on echocardiogram.  Refilled Eliquis today. Given some samples as well. Still seems to be in atrial fibrillation today. Follow-up with cardiology as scheduled. Rate is controlled. - apixaban (ELIQUIS) 5 MG TABS tablet; Take 1 tablet by mouth 2 times daily  Dispense: 60 tablet; Refill: 3    2. Chronic obstructive pulmonary disease, unspecified COPD type ([address]  No new issues. Near baseline. Rarely using albuterol    3. Chronic pain of left knee  Recent popping sensation. Mentioned this at the end of his visit. Overall somewhat improved. We discussed some home exercises. He does have a follow-up appointment in a couple of months. We will plan on discussing further at that time if still an issue. May be sooner if worsening symptoms          This document was prepared by a combination of typing and transcription through a voice recognition software.

## 2023-01-04 ENCOUNTER — CARE COORDINATION (OUTPATIENT)
Dept: CASE MANAGEMENT | Age: 78
End: 2023-01-04

## 2023-01-04 NOTE — CARE COORDINATION
[facility] Care Transitions Follow Up Call    Care Transition Nurse contacted the patient by telephone to follow up after admission. Verified name and  with patient as identifiers. Patient: Nhan José  Patient : 1945   MRN: [id number]  Reason for Admission: new onset atrial fib d/c w/ cardiac monitor, PMH COPD,   Discharge Date: 22 RARS: No data recorded    Needs to be reviewed by the provider   Additional needs identified to be addressed with provider: No  none             Method of communication with provider: none. Attempted to reach patient via phone for transition call. VM left on home and mobile numbers stating purpose of call along with my contact information requesting a return call. Patient called CTN back after getting vm. Stated he and his wife were in [address] at an [employer] this afternoon and apologized for missing call. Stated he is feeling well and continues to wear cardiac monitor. Stated this is the last week and scheduled to return it on Tuesday of next week. Denied any CP or SOB. Stated he has to get around slowly and take breaks with his COPD, but didn't feel it was any worse. Denies any acute needs at present time. Agreeable to f/u calls. Educated on the use of urgent care or physicians 24 hr access line if assistance is needed after hours. Addressed changes since last contact:  none  Discussed follow-up appointments. If no appointment was previously scheduled, appointment scheduling offered: Yes. Is follow up appointment scheduled within 7 days of discharge? Yes.     Follow Up  Future Appointments   Date Time Provider Coretta Basurto   2023  2:30 PM A ECHO ROOM [facility] AND BRANDON Bazzi   2/15/2023  8:20 AM MD KAMAR Dodson [place]  [place]   2023  2:45 PM MD Erika [facility] follow up appointment(s):     Care Transition Nurse reviewed medical action plan with patient and discussed any barriers to care and/or understanding of plan of care after discharge. Discussed appropriate site of care based on symptoms and resources available to patient including: PCP  Specialist. The patient agrees to contact the PCP office for questions related to their healthcare. Advance Care Planning:   reviewed and current. Patients top risk factors for readmission: functional physical ability  Interventions to address risk factors: Education of patient/family/caregiver/guardian to support self-management-     Offered patient enrollment in the Remote Patient Monitoring (RPM) program for in-home monitoring:  discuss with next contact . Care Transitions Subsequent and Final Call    Subsequent and Final Calls  Do you have any ongoing symptoms?: No  Have your medications changed?: No  Do you have any questions related to your medications?: No  Do you currently have any active services?: No  Do you have any needs or concerns that I can assist you with?: No  Identified Barriers: None  Care Transitions Interventions  Other Interventions:             Care Transition Nurse provided contact information for future needs. Plan for follow-up call in 7-10 days based on severity of symptoms and risk factors.   Plan for next call:  Giovany Cabrera RN

## 2023-01-06 ENCOUNTER — TELEPHONE (OUTPATIENT)
Dept: CARDIOLOGY CLINIC | Age: 78
End: 2023-01-06

## 2023-01-06 NOTE — TELEPHONE ENCOUNTER
We received an \"event\" strip from [employer] -scanned into this encounter.     Consult GREG 12/13/22

## 2023-01-12 ENCOUNTER — CARE COORDINATION (OUTPATIENT)
Dept: CARE COORDINATION | Age: 78
End: 2023-01-12

## 2023-01-12 ENCOUNTER — CARE COORDINATION (OUTPATIENT)
Dept: CASE MANAGEMENT | Age: 78
End: 2023-01-12

## 2023-01-12 DIAGNOSIS — I10 HYPERTENSION, UNSPECIFIED TYPE: Primary | ICD-10-CM

## 2023-01-12 NOTE — CARE COORDINATION
[facility] Care Transitions Follow Up Call    Care Transition Nurse contacted the patient by telephone to follow up after admission. Verified name and  with patient as identifiers. Patient: Umu Rojas  Patient : 1945   MRN: [id number]  Reason for Admission: new onset atrial fib d/c w/ cardiac monitor, PMH COPD,   Discharge Date: 22 RARS: No data recorded    Needs to be reviewed by the provider   Additional needs identified to be addressed with provider: No  none             Method of communication with provider: none. Patient answered call and verified . Patient pleasant and agreeable to transition call. Patient stated that he saw a message in [place] about a cardiac monitor strip and unsure what the monitor strip was about. CTN reviewed media tab and reviewed with patient. Patient request that information be reviewed with his wife. Stated his wife is a retired critical care nurse. CTN spoke to wife and information reviewed and encouraged to reach out to Dr Bernardino Sims with further questions. CTN educated patient on wife on RPM program purpose and benefits. CTN explained process and enrollment. Wife stated that she takes patient's vital signs every day and feels that patient would benefit from program. Agreeable to RPM and enrollment process started. Addressed changes since last contact:  none  Discussed follow-up appointments. If no appointment was previously scheduled, appointment scheduling offered: Yes. Is follow up appointment scheduled within 7 days of discharge? Yes.     Follow Up  Future Appointments   Date Time Provider Coretta Basurto   2023  2:30 PM A ECHO ROOM [facility] AND CAR Jeremiah Suárez   2/15/2023  8:20 AM MD KAMAR James [facility]   2023  2:45 PM MD Ekaterina Mullins [facility]     Non-[facility] follow up appointment(s):     Care Transition Nurse reviewed discharge instructions with patient and family and discussed any barriers to care and/or understanding of plan of care after discharge. Discussed appropriate site of care based on symptoms and resources available to patient including: PCP  Specialist. The patient agrees to contact the PCP office for questions related to their healthcare. Advance Care Planning:   reviewed and current. Patients top risk factors for readmission: functional physical ability  Interventions to address risk factors: Education of patient/family/caregiver/guardian to support self-management-     Offered patient enrollment in the Remote Patient Monitoring (RPM) program for in-home monitoring: Yes, patient enrolled. Discussed program,  but wife is retired critical care nurse and has all equipment and checks vital signs on a daily basis. Remote Patient Monitoring Enrollment Note      Date/Time:  1/12/2023 2:30 PM    Offered patient enrollment in the [facility] Remote Patient Monitoring (RPM) program for in home monitoring for HTN. Patient accepted RPM services. Patient will be monitoring the following daily:  blood pressure reading  pulse ox heart rate  weight    CTN reviewed the information below with patient:    Emergency Contact (name and contact number): Carlos Garza [phone]    [x] A member from the care coordination team will reach out to notify the patient once the RPM kit is ordered. [x] Once the kit is delivered, the [facility] team will contact the patient after UPS deliver to assist with set up. [x] Determined BP cuff size: regular (9.05\"-15.74\")      [x] Determined weight scale: regular (<330lbs)                                                 [x] Hours of ACM monitoring - Monday-Friday [id number]                         All questions about RPM program answered at this time.         Care Transitions Subsequent and Final Call    Subsequent and Final Calls  Do you have any ongoing symptoms?: No  Have your medications changed?: No  Do you have any questions related to your medications?: No  Do you currently have any active services?: No  Do you have any needs or concerns that I can assist you with?: No  Identified Barriers: None  Care Transitions Interventions  Other Interventions:             Care Transition Nurse provided contact information for future needs. Plan for follow-up call in 7-10 days based on severity of symptoms and risk factors.  Plan for next call: self management-did patient enrolled into [facility]    Vira Amador RN

## 2023-01-12 NOTE — CARE COORDINATION
Remote Patient Kit Ordering Note      Date/Time:  1/12/2023 3:21 PM      [x] [facility] confirmed patient shipping address  [x] Patient will receive package over the next 2-4 business days. Someone 21 years or older must be present to sign for UPS delivery. [x] Patient to contact virtual installation-specific phone number listed in the patient instructions. [x] If the patient does not contact HRS within 24 hours, an [facility] will call the patient directly: If the patient does not answer, HRS will follow up with the clinical team notifying them about the unsuccessful attempt to contact the patient. HRS will make three call attempts to the patient. [x] LPN will contact patient once equipment is active to welcome them to the program.                                                         [x] Hours of RPM monitoring - Monday-Friday 5827-2918                     All questions answered at this time. CTN made aware the RPM kit has been ordered. [facility]SS notified patient of RPM equipment order.

## 2023-01-12 NOTE — PROGRESS NOTES
1/12/23 6:04 PM      Remote Patient Monitoring Treatment Plan    Received request from ACM/SPENCER Huang RN to order remote patient monitoring for in home monitoring of HTN and order completed. Patient will be monitoring blood pressure   pulse ox   weight Please set alert for ONLY weight gain of 5# in 7 days. Pt has no documented hx of HF. Noreen Ruffing Patient will engage in Remote Patient Monitoring each day to develop the skills necessary for self management. RPM Care Team Responsibilities:   Alerts will be reviewed daily and addressed within 2-4 hours during operational hours (Monday -Friday 9 am-4 pm)  Alert response and intervention documented in patient medical record  Alert response escalated to PCP per protocol and documented in patient medical record  Patient monitored over approximately  days  Discharge from program based on self-management readiness    See care coordination encounters for additional details.       Dragan Ann DNP, FNP-C, Remote Patient Monitoring NP, (Ph) [phone]

## 2023-01-17 ENCOUNTER — HOSPITAL ENCOUNTER (OUTPATIENT)
Dept: CARDIOLOGY | Age: 78
Discharge: HOME | End: 2023-01-17
Payer: MEDICARE

## 2023-01-17 DIAGNOSIS — I10 HYPERTENSION, UNSPECIFIED TYPE: ICD-10-CM

## 2023-01-17 DIAGNOSIS — I48.91 NEW ONSET ATRIAL FIBRILLATION (HCC): ICD-10-CM

## 2023-01-17 LAB
LV EF: 53 %
LVEF MODALITY: NORMAL

## 2023-01-17 PROCEDURE — 93306 TTE W/DOPPLER COMPLETE: CPT

## 2023-01-18 ENCOUNTER — CARE COORDINATION (OUTPATIENT)
Dept: CASE MANAGEMENT | Age: 78
End: 2023-01-18

## 2023-01-18 NOTE — CARE COORDINATION
[facility] Care Transitions Follow Up Call    Care Transition Nurse contacted the patient by telephone to follow up after admission. Verified name and  with patient as identifiers. Patient: Kevin Berrios  Patient : 1945   MRN: [id number]  Reason for Admission: new onset atrial fib d/c w/ cardiac monitor, PMH COPD,   Discharge Date: 22 RARS: No data recorded    Needs to be reviewed by the provider   Additional needs identified to be addressed with provider: No  none             Method of communication with provider: none. Patient answered call and verified . Patient pleasant and agreeable to transition call. Patient is doing well and spoke to wife- HIPAA verified. Patient was speaking to  and was not available. Wife stated that patient is doing well, had echo yesterday and [facility]. Wife is a retired critical care nurse and familiar with results. Stated patient is taking all medication as directed. Confirmed that RPM program was started and he is actively participating with taking vital signs daily and entering into tablet. Informed wife that CTN was completing transition episode and [facility]. Wife appreciative of support and denied any acute needs at present time. Educated on the use of urgent care or physicians 24 hr access line if assistance is needed after hours. Addressed changes since last contact:  none  Discussed follow-up appointments. If no appointment was previously scheduled, appointment scheduling offered: Yes. Is follow up appointment scheduled within 7 days of discharge? Yes.     Follow Up  Future Appointments   Date Time Provider Coretta Basurto   2/15/2023  8:20 AM Heather Renee MD [facility]   2023  2:45 PM MD Philipp [facility]     Non-[facility] follow up appointment(s):     Care Transition Nurse reviewed medical action plan with patient and family and discussed any barriers to care and/or understanding of plan of care after discharge. Discussed appropriate site of care based on symptoms and resources available to patient including: PCP  Specialist. The patient and family agrees to contact the PCP office for questions related to their healthcare. Advance Care Planning:   reviewed and current. Patients top risk factors for readmission: functional physical ability  Interventions to address risk factors: Education of patient/family/caregiver/guardian to support self-management-     Offered patient enrollment in the Remote Patient Monitoring (RPM) program for in-home monitoring:  patient enrolled . Care Transitions Subsequent and Final Call    Subsequent and Final Calls  Do you have any ongoing symptoms?: No  Have your medications changed?: No  Do you have any questions related to your medications?: No  Do you currently have any active services?: No  Do you have any needs or concerns that I can assist you with?: No  Identified Barriers: None  Care Transitions Interventions  Other Interventions:             Care Transition Nurse provided contact information for future needs. No further follow-up call indicated based on severity of symptoms and risk factors.   Plan for next call:  transferred to [facility], Jasson Cerrato RN

## 2023-01-18 NOTE — CARE COORDINATION
Remote Patient Monitoring Welcome Note      Date/Time:  2023 2:52 PM     Verified patients name and  as identifiers. Completed and confirmed the following:     Emergency Contact: Sawyer Moran, Spouse    [x] Patient received all RPM equipment (tablet, scale, blood pressure device and cuff, and pulse oximeter)  Cuff Size: Small  []  Regular   [x]  Large  []   Weight Scale: Regular   [x]  Bariatric  []               [x] Instructed patient keep box for use when returning equipment                                                          [x] Reviewed Patient Welcome Letter with patient                         [x] Reviewed expectations for patient and care team  [x] Reviewed RPM consent form         [x] Instructed patient to keep scale on flat surface                                                         [x] Instructed patient to keep tablet plugged in at all times                         [x] Instructed how to contact IT support (number listed on welcome letter)  [x] Provided Remote Patient Monitoring care  information                 All questions answered at this time.    Ramirez Paz LPN, [facility]  PH: [phone]     Green Alert -Current Patient Metrics ---- Blood Pressure: 140/97, 80bpm Pulseox: 97%, 81bpm Survey: C Weight: 230.0lbs Note Created at: 2023 02:53 PM ET ---- Time-Spent: 7 minutes 0 seconds

## 2023-01-19 ENCOUNTER — CARE COORDINATION (OUTPATIENT)
Dept: CARE COORDINATION | Age: 78
End: 2023-01-19

## 2023-01-19 ENCOUNTER — TELEPHONE (OUTPATIENT)
Dept: CARDIOLOGY CLINIC | Age: 78
End: 2023-01-19

## 2023-01-19 NOTE — CARE COORDINATION
Remote Patient Monitoring Note      Date/Time:  1/19/2023 9:08 AM    CCSS reviewed patients reported daily Remote Patient Monitoring metrics. All reported metrics are within alert parameters. Plan/Follow Up:  Will continue to review, monitor and address alerts with follow up based on severity of symptoms and risk factors   ---- Current Patient Metrics ---- Blood Pressure: 139/86, 68bpm Pulseox: 97%, 78bpm Survey: C Weight: 228.5lbs Note Created at: 01/19/2023 09:08 AM ET ---- Time-Spent: 2 minutes 0 seconds

## 2023-01-19 NOTE — TELEPHONE ENCOUNTER
----- Message from Gabriel Linares MD sent at 1/17/2023  6:53 PM EST -----  Low normal LV systolic function.

## 2023-01-20 ENCOUNTER — CARE COORDINATION (OUTPATIENT)
Dept: CARE COORDINATION | Age: 78
End: 2023-01-20

## 2023-01-20 DIAGNOSIS — I48.0 PAROXYSMAL ATRIAL FIBRILLATION (HCC): ICD-10-CM

## 2023-01-20 NOTE — CARE COORDINATION
Remote Patient Monitoring Note      Date/Time:  1/20/2023 11:01 AM    CCSS reviewed patients reported daily Remote Patient Monitoring metrics. All reported metrics are within alert parameters. Plan/Follow Up:  Will continue to review, monitor and address alerts with follow up based on severity of symptoms and risk factors  ---- Current Patient Metrics ---- Blood Pressure: 140/89, 82bpm Pulseox: 98%, 89bpm Survey: C Weight: 229.0lbs Note Created at: 01/20/2023 11:02 AM ET ---- Time-Spent: 2 minutes 0 seconds

## 2023-01-23 ENCOUNTER — CARE COORDINATION (OUTPATIENT)
Dept: CARE COORDINATION | Age: 78
End: 2023-01-23

## 2023-01-23 NOTE — CARE COORDINATION
Remote Patient Monitoring Note      Date/Time:  1/23/2023 1:58 PM    CCSS reviewed patients reported daily Remote Patient Monitoring metrics. All reported metrics are within alert parameters. Plan/Follow Up:  Will continue to review, monitor and address alerts with follow up based on severity of symptoms and risk factors  Current Patient Metrics ---- Blood Pressure: 117/81, 75bpm Pulseox: 98%, 77bpm Survey: C Weight: 230.5lbs Note Created at: 01/23/2023 01:57 PM ET ---- Time-Spent: 2 minutes 0 seconds

## 2023-01-24 ENCOUNTER — CARE COORDINATION (OUTPATIENT)
Dept: CARE COORDINATION | Age: 78
End: 2023-01-24

## 2023-01-24 ENCOUNTER — CARE COORDINATION (OUTPATIENT)
Dept: CASE MANAGEMENT | Age: 78
End: 2023-01-24

## 2023-01-24 NOTE — CARE COORDINATION
Ambulatory Care Coordination Note  1/24/2023    ACC: Leny Ugarte RN    PLAN:  Patient will continue to monitor daily BP, HR, Weights with RPM  Will send the following for review from [facility] Clinical References:  Hypertension  COPD Triggers  COPD Exacerbation Plan  Asthma or COPD: Using a MDI    Patient has f/u appointment with PCP on 2/15/2023  Blood Pressures & Heart Rates        Day BP HR        1/24/23 143/92 83   1/23/23 117/81 75   1/20/23 140/89 82   1/19/23 139/86 76     FYI:  Introduced role of ACM/CC; patient and spouse agree to f/u calls  Reviewed medications  Began CC assessment  Discussed My Chart; patient's spouse is active with My Chart  Provided My Chart assistance contact information  Encouraged patient/spouse to review My Chart [facility] Josh Mendoza 20  Offered patient enrollment in the Remote Patient Monitoring (RPM) program for in-home monitoring:  Patient is enrolled . General Assessment    Do you have any symptoms that are causing concern?: No        COPD Assessment       No patient-reported symptoms         Symptoms:             Ambulatory Care Coordination Assessment    Care Coordination Protocol  Referral from Primary Care Provider: No  Week 1 - Initial Assessment     Do you have all of your prescriptions and are they filled?: Yes (Comment: reviewed 1/24/2023)  Are you able to afford your medications?: Yes  How often do you have trouble taking your medications the way you have been told to take them?: I always take them as prescribed.      Do you have Home O2 Therapy?: No         Current Housing: Private Residence        Per the Fall Risk Screening, did the patient have 2 or more falls or 1 fall with injury in the past year?: No     Frequent urination at night?: No  Do you use rails/bars?: No  Do you have a non-slip tub mat?: Yes        Thinking about your patient's physical health needs, are there any symptoms or problems (risk indicators) you are unsure about that require further investigation?: Mild vague physical symptoms or problems; but do not impact on daily life or are not of concern to patient   Are the patients physical health problems impacting on their mental well-being?: No identified areas of concern   Are there any problems with your patients lifestyle behaviors (alcohol, drugs, diet, exercise) that are impacting on physical or mental well-being?: No identified areas of concern   Do you have any other concerns about your patients mental well-being? How would you rate their severity and impact on the patient?: No identified areas of concern   How would you rate their home environment in terms of safety and stability (including domestic violence, insecure housing, neighbor harassment)?: Consistently safe, supportive, stable, no identified problems   How do daily activities impact on the patient's well-being? (include current or anticipated unemployment, work, caregiving, access to transportation or other): No identified problems or perceived positive benefits   How would you rate their social network (family, work, friends)?: Adequate participation with social networks   How would you rate their financial resources (including ability to afford all required medical care)?: Financially secure, resources adequate, no identified problems   How wells does the patient now understand their health and well-being (symptoms, signs or risk factors) and what they need to do to manage their health?: Reasonable to good understanding and already engages in managing health or is willing to undertake better management   How well do you think your patient can engage in healthcare discussions?  (Barriers include language, deafness, aphasia, alcohol or drug problems, learning difficulties, concentration): Clear and open communication, no identified barriers   Do other services need to be involved to help this patient?: Other care/services in place and adequate   Suggested Interventions and [employer] Management Tools: In Process                  Prior to Admission medications    Medication Sig Start Date End Date Taking? Authorizing Provider   apixaban (ELIQUIS) 5 MG TABS tablet Take 1 tablet by mouth 2 times daily 1/20/23  Yes Diego Beach MD   Fluticasone furoate-vilanterol (BREO ELLIPTA) 200-25 MCG/INH AEPB inhaler INHALE ONE PUFF BY MOUTH DAILY 11/28/22  Yes Diego Beach MD   albuterol sulfate HFA (PROVENTIL HFA) 108 (90 Base) MCG/ACT inhaler Inhale 2 puffs into the lungs every 6 hours as needed for Wheezing 8/15/22  Yes Diego Beach MD   atorvastatin (LIPITOR) 20 MG tablet TAKE ONE TABLET BY MOUTH EVERY DAY 8/15/22  Yes Diego Beach MD   Glucos-MSM-C-Mk-Obfeac-Gwxvzj (GLUCOSAMINE MSM COMPLEX PO) Take by mouth daily   Yes Historical Provider, MD   B Complex-C (SUPER B COMPLEX PO) Take by mouth daily   Yes Historical Provider, MD   Cholecalciferol (VITAMIN D3) 125 MCG (5000 UT) TABS Take by mouth daily   Yes Historical Provider, MD   Omega 3-6-9 Fatty Acids (OMEGA-3-6-9 PO) Take by mouth daily   Yes Historical Provider, MD   Coenzyme Q10 (CO Q-10) 200 MG CAPS Take 200 mg by mouth daily   Yes Historical Provider, MD   Melatonin 10 MG CAPS Take 10 mg by mouth nightly   Yes Historical Provider, MD   Calcium-Magnesium-Vitamin D (CALCIUM MAGNESIUM PO) Take 1 tablet by mouth Daily. Dose is 1200/400    Yes Historical Provider, MD   acetaminophen (TYLENOL) 325 MG tablet Take 1,000 mg by mouth as needed. Yes Historical Provider, MD   therapeutic multivitamin-minerals (THERAGRAN-M) tablet Take 1 tablet by mouth daily. Yes Historical Provider, MD   omeprazole (PRILOSEC) 20 MG delayed release capsule Take 1 capsule by mouth in the morning.  8/15/22 12/30/22  Digeo Beach MD   polyethylene glycol (GLYCOLAX) 17 g packet Take 17 g by mouth daily as needed for Constipation    Historical Provider, MD       Future Appointments   Date Time Provider Coretta Basurto   2/15/2023  8:20 Stanly Lesches MD [facility]   4/11/2023  2:45 PM MD [facility]

## 2023-01-24 NOTE — CARE COORDINATION
Remote Alert Monitoring Note      Date/Time:  2023 8:04 AM    LPN contacted patient by telephone regarding red alert received for blood pressure reading (126/102). Verified patients name and  as identifiers. Background: RPM for HTN  Refer to 911 immediately if:  Patient unresponsive or unable to provide history  Change in cognition or sudden confusion  Patient unable to respond in complete sentences  Intense chest pain/tightness  Any concern for any clinical emergency  Red Alert: Provider response time of 1 hr required for any red alert requiring intervention  Yellow Alert: Provider response time of 3hr required for any escalated yellow alert    BP Triage  Are you having any Chest Pain? no   Are you having any Shortness of Breath? no   Do you have a headache or have any vision changes? no   Are you having any numbness or tingling? no   Are you having any other health concerns or issues? no       Clinical Interventions: Reviewed and followed up on alerts and treatments-LPN contacted pt in regards to RPM red alert for BP of 126/102. Pt denied any symptoms. Pt rechecked while writer held the line and has a new BP of  143/92, WNL for this pt. Plan/Follow Up: Will continue to review, monitor and address alerts with follow up based on severity of symptoms and risk factors.   Camacho Goldman LPN, [facility]  PH: [phone]  - Current Patient Metrics ---- Blood Pressure: 143/92, 83bpm Pulseox: 96%, 68bpm Survey: C Weight: 231.0lbs Note Created at: 2023 09:02 AM ET ---- Time-Spent: 7 minutes 0 seconds

## 2023-01-25 ENCOUNTER — CARE COORDINATION (OUTPATIENT)
Dept: CARE COORDINATION | Age: 78
End: 2023-01-25

## 2023-01-25 NOTE — CARE COORDINATION
Remote Patient Monitoring Note      Date/Time:  1/25/2023 8:26 AM    CCSS reviewed patients reported daily Remote Patient Monitoring metrics. All reported metrics are within alert parameters. Plan/Follow Up:  Will continue to review, monitor and address alerts with follow up based on severity of symptoms and risk factors  ---- Current Patient Metrics ---- Blood Pressure: 140/93, 79bpm Pulseox: 96%, 55bpm Survey: C Weight: 232.0lbs Note Created at: 01/25/2023 08:26 AM ET ---- Time-Spent: 2 minutes 0 seconds

## 2023-01-26 ENCOUNTER — CARE COORDINATION (OUTPATIENT)
Dept: CARE COORDINATION | Age: 78
End: 2023-01-26

## 2023-01-26 NOTE — CARE COORDINATION
Remote Patient Monitoring Note      Date/Time:  1/26/2023 12:40 PM    CCSS reviewed patients reported daily Remote Patient Monitoring metrics. All reported metrics are within alert parameters. Plan/Follow Up:  Will continue to review, monitor and address alerts with follow up based on severity of symptoms and risk factors  ---- Current Patient Metrics ---- Blood Pressure: 133/78, 96bpm Pulseox: 96%, 70bpm Survey: C Weight: 229.5lbs Note Created at: 01/26/2023 12:40 PM ET ---- Time-Spent: 2 minutes 0 seconds

## 2023-01-27 ENCOUNTER — CARE COORDINATION (OUTPATIENT)
Dept: CARE COORDINATION | Age: 78
End: 2023-01-27

## 2023-01-27 NOTE — CARE COORDINATION
Remote Patient Monitoring Note      Date/Time:  1/27/2023 8:37 AM    CCSS reviewed patients reported daily Remote Patient Monitoring metrics. All reported metrics are within alert parameters. Plan/Follow Up:  Will continue to review, monitor and address alerts with follow up based on severity of symptoms and risk factors   ---- Current Patient Metrics ---- Blood Pressure: 155/72, 59bpm Pulseox: 98%, 77bpm Survey: C Weight: 230.0lbs Note Created at: 01/27/2023 08:37 AM ET ---- Time-Spent: 2 minutes 0 seconds

## 2023-01-30 ENCOUNTER — CARE COORDINATION (OUTPATIENT)
Dept: CARE COORDINATION | Age: 78
End: 2023-01-30

## 2023-01-30 NOTE — CARE COORDINATION
Remote Patient Monitoring Note      Date/Time:  1/30/2023 10:27 AM    CCSS reviewed patients reported daily Remote Patient Monitoring metrics. All reported metrics are within alert parameters. Plan/Follow Up:  Will continue to review, monitor and address alerts with follow up based on severity of symptoms and risk factors  --- Current Patient Metrics ---- Blood Pressure: 130/91, 91bpm Pulseox: 97%, 91bpm Survey: C Weight: 230.0lbs Note Created at: 01/30/2023 10:27 AM ET ---- Time-Spent: 2 minutes 0 seconds

## 2023-01-31 ENCOUNTER — CARE COORDINATION (OUTPATIENT)
Dept: CARE COORDINATION | Age: 78
End: 2023-01-31

## 2023-01-31 NOTE — CARE COORDINATION
Remote Patient Monitoring Note      Date/Time:  1/31/2023 9:01 AM    CCSS reviewed patients reported daily Remote Patient Monitoring metrics. All reported metrics are within alert parameters. Plan/Follow Up:  Will continue to review, monitor and address alerts with follow up based on severity of symptoms and risk factors   ---- Current Patient Metrics ---- Blood Pressure: 140/86, 76bpm Pulseox: 98%, 75bpm Survey: C Weight: 230.0lbs Note Created at: 01/31/2023 09:01 AM ET ---- Time-Spent: 2 minutes 0 seconds

## 2023-02-01 ENCOUNTER — CARE COORDINATION (OUTPATIENT)
Dept: CARE COORDINATION | Age: 78
End: 2023-02-01

## 2023-02-01 NOTE — CARE COORDINATION
Remote Patient Monitoring Note      Date/Time:  2/1/2023 1:40 PM    CCSS reviewed patients reported daily Remote Patient Monitoring metrics. All reported metrics are within alert parameters. Plan/Follow Up:  Will continue to review, monitor and address alerts with follow up based on severity of symptoms and risk factors  ---- Current Patient Metrics ---- Blood Pressure: 130/86, 82bpm Pulseox: 98%, 75bpm Survey: C Weight: 231.0lbs Note Created at: 02/01/2023 01:41 PM ET ---- Time-Spent: 2 minutes 0 seconds

## 2023-02-02 ENCOUNTER — CARE COORDINATION (OUTPATIENT)
Dept: CARE COORDINATION | Age: 78
End: 2023-02-02

## 2023-02-02 NOTE — CARE COORDINATION
Remote Patient Monitoring Note      Date/Time:  2/2/2023 2:32 PM    CCSS reviewed patients reported daily Remote Patient Monitoring metrics. All reported metrics are within alert parameters. Plan/Follow Up:  Will continue to review, monitor and address alerts with follow up based on severity of symptoms and risk factors  ---- Current Patient Metrics ---- Blood Pressure: 144/92, 78bpm Pulseox: 98%, 85bpm Survey: C Weight: 229.5lbs Note Created at: 02/02/2023 02:32 PM ET ---- Time-Spent: 2 minutes 0 seconds

## 2023-02-03 ENCOUNTER — CARE COORDINATION (OUTPATIENT)
Dept: CARE COORDINATION | Age: 78
End: 2023-02-03

## 2023-02-03 NOTE — CARE COORDINATION
Remote Patient Monitoring Note      Date/Time:  2/3/2023 12:06 PM    CCSS reviewed patients reported daily Remote Patient Monitoring metrics. All reported metrics are within alert parameters. Plan/Follow Up:  Will continue to review, monitor and address alerts with follow up based on severity of symptoms and risk factors   ---- Current Patient Metrics ---- Blood Pressure: 133/80, 76bpm Pulseox: 98%, 79bpm Survey: C Weight: 230.0lbs Note Created at: 02/03/2023 12:06 PM ET ---- Time-Spent: 2 minutes 0 seconds

## 2023-02-06 ENCOUNTER — CARE COORDINATION (OUTPATIENT)
Dept: CARE COORDINATION | Age: 78
End: 2023-02-06

## 2023-02-06 NOTE — CARE COORDINATION
Remote Patient Monitoring Note      Date/Time:  2/6/2023 10:31 AM    CCSS reviewed patients reported daily Remote Patient Monitoring metrics. All reported metrics are within alert parameters. Plan/Follow Up:  Will continue to review, monitor and address alerts with follow up based on severity of symptoms and risk factors  --- Current Patient Metrics ---- Blood Pressure: 133/75, 87bpm Pulseox: 98%, 70bpm Survey: C Weight: 229.0lbs Note Created at: 02/06/2023 10:31 AM ET ---- Time-Spent: 2 minutes 0 seconds

## 2023-02-07 ENCOUNTER — CARE COORDINATION (OUTPATIENT)
Dept: CARE COORDINATION | Age: 78
End: 2023-02-07

## 2023-02-07 NOTE — CARE COORDINATION
Remote Patient Monitoring Note      Date/Time:  2/7/2023 1:10 PM    CCSS reviewed patients reported daily Remote Patient Monitoring metrics. All reported metrics are within alert parameters. Plan/Follow Up:  Will continue to review, monitor and address alerts with follow up based on severity of symptoms and risk factors  ---- Current Patient Metrics ---- Blood Pressure: 126/82, 70bpm Pulseox: 95%, 65bpm Survey: C Weight: 230.5lbs Note Created at: 02/07/2023 01:11 PM ET ---- Time-Spent: 2 minutes 0 seconds

## 2023-02-08 ENCOUNTER — CARE COORDINATION (OUTPATIENT)
Dept: CARE COORDINATION | Age: 78
End: 2023-02-08

## 2023-02-08 NOTE — CARE COORDINATION
Remote Patient Monitoring Note      Date/Time:  2/8/2023 1:48 PM    CCSS reviewed patients reported daily Remote Patient Monitoring metrics. All reported metrics are within alert parameters. Plan/Follow Up:  Will continue to review, monitor and address alerts with follow up based on severity of symptoms and risk factors  ---- Current Patient Metrics ---- Blood Pressure: 143/84, 71bpm Pulseox: 98%, 75bpm Survey: C Weight: 231.5lbs Note Created at: 02/08/2023 01:48 PM ET ---- Time-Spent: 2 minutes 0 seconds

## 2023-02-09 ENCOUNTER — CARE COORDINATION (OUTPATIENT)
Dept: CARE COORDINATION | Age: 78
End: 2023-02-09

## 2023-02-09 NOTE — CARE COORDINATION
Remote Patient Monitoring Note      Date/Time:  2/9/2023 4:04 PM    CCSS reviewed patients reported daily Remote Patient Monitoring metrics. All reported metrics are within alert parameters. Plan/Follow Up:  Will continue to review, monitor and address alerts with follow up based on severity of symptoms and risk factors  Current Patient Metrics ---- Blood Pressure: 135/82, 85bpm Pulseox: 96%, 77bpm Survey: C Weight: 230.5lbs Note Created at: 02/09/2023 04:04 PM ET ---- Time-Spent: 2 minutes 0 seconds

## 2023-02-10 ENCOUNTER — CARE COORDINATION (OUTPATIENT)
Dept: CARE COORDINATION | Age: 78
End: 2023-02-10

## 2023-02-10 NOTE — CARE COORDINATION
Remote Patient Monitoring Note      Date/Time:  2/10/2023 2:34 PM    CCSS reviewed patients reported daily Remote Patient Monitoring metrics. All reported metrics are within alert parameters. Plan/Follow Up:  Will continue to review, monitor and address alerts with follow up based on severity of symptoms and risk factors Current Patient Metrics ---- Blood Pressure: 148/84, 91bpm Pulseox: 94%, 84bpm Survey: C Weight: 230.0lbs Note Created at: 02/10/2023 02:34 PM ET ---- Time-Spent: 2 minutes 0 seconds

## 2023-02-13 ENCOUNTER — CARE COORDINATION (OUTPATIENT)
Dept: CASE MANAGEMENT | Age: 78
End: 2023-02-13

## 2023-02-13 NOTE — CARE COORDINATION
Remote Patient Monitoring Note      Date/Time:  2/13/2023 9:40 AM    LPN reviewed patients reported daily Remote Patient Monitoring metrics. All reported metrics are within alert parameters. Plan/Follow Up:  Will continue to review, monitor and address alerts with follow up based on severity of symptoms and risk factors    ---- Current Patient Metrics ---- Blood Pressure: 137/84, 75bpm Pulseox: 98%, 84bpm Survey: C Weight: 231.5lbs Note Created at: 02/13/2023 09:38 AM ET ---- Time-Spent: 2 minutes 0 seconds    [employer]  LPN, [facility]  PH: [phone]

## 2023-02-14 ENCOUNTER — CARE COORDINATION (OUTPATIENT)
Dept: CASE MANAGEMENT | Age: 78
End: 2023-02-14

## 2023-02-14 NOTE — CARE COORDINATION
Remote Alert Monitoring Note      Date/Time:  2023 8:01 AM  Patient Current Location: [facility]    LPN contacted patient by telephone regarding red alert received for blood pressure heart rate (150 bpm). Verified patients name and  as identifiers. Background: RPM for HTN  Refer to 911 immediately if:  Patient unresponsive or unable to provide history  Change in cognition or sudden confusion  Patient unable to respond in complete sentences  Intense chest pain/tightness  Any concern for any clinical emergency  Red Alert: Provider response time of 1 hr required for any red alert requiring intervention  Yellow Alert: Provider response time of 3hr required for any escalated yellow alert  HR Triage  Are you having any Chest Pain? no   Are you having any Shortness of Breath? no   Are you having any dizziness? no   Are you feeling more fatigued or tired than normal? no   Are you having any other health concerns or issues? no           Clinical Interventions: Reviewed and followed up on alerts and treatments-LPN contacted pt in regards to RPM red alert for BP HR of 150 bpm. Pt denied any symptoms and rechecked and new HR is 74 bpm.      Plan/Follow Up: Will continue to review, monitor and address alerts with follow up based on severity of symptoms and risk factors.     Elaina Lindquist LPN, [facility]  PH: [phone]     Current Patient Metrics ---- Blood Pressure: 141/75, 74bpm Pulseox: 98%, 75bpm Survey: C Weight: 231.5lbs Note Created at: 2023 08:57 AM ET ---- Time-Spent: 6 minutes 0 seconds

## 2023-02-15 ENCOUNTER — CARE COORDINATION (OUTPATIENT)
Dept: CARE COORDINATION | Age: 78
End: 2023-02-15

## 2023-02-15 ENCOUNTER — OFFICE VISIT (OUTPATIENT)
Dept: FAMILY MEDICINE CLINIC | Age: 78
End: 2023-02-15
Payer: MEDICARE

## 2023-02-15 VITALS
HEIGHT: 71 IN | SYSTOLIC BLOOD PRESSURE: 122 MMHG | OXYGEN SATURATION: 97 % | WEIGHT: 238.4 LBS | DIASTOLIC BLOOD PRESSURE: 72 MMHG | BODY MASS INDEX: 33.38 KG/M2 | HEART RATE: 66 BPM

## 2023-02-15 DIAGNOSIS — C61 PROSTATE CANCER (HCC): ICD-10-CM

## 2023-02-15 DIAGNOSIS — I48.0 PAROXYSMAL ATRIAL FIBRILLATION (HCC): Primary | ICD-10-CM

## 2023-02-15 DIAGNOSIS — M51.36 DDD (DEGENERATIVE DISC DISEASE), LUMBAR: ICD-10-CM

## 2023-02-15 DIAGNOSIS — E78.2 MIXED HYPERLIPIDEMIA: ICD-10-CM

## 2023-02-15 DIAGNOSIS — I25.10 CORONARY ARTERY DISEASE INVOLVING NATIVE CORONARY ARTERY OF NATIVE HEART WITHOUT ANGINA PECTORIS: ICD-10-CM

## 2023-02-15 DIAGNOSIS — J44.9 CHRONIC OBSTRUCTIVE PULMONARY DISEASE, UNSPECIFIED COPD TYPE (HCC): ICD-10-CM

## 2023-02-15 PROBLEM — I10 HYPERTENSION: Status: RESOLVED | Noted: 2018-05-22 | Resolved: 2023-02-15

## 2023-02-15 PROCEDURE — 3023F SPIROM DOC REV: CPT | Performed by: FAMILY MEDICINE

## 2023-02-15 PROCEDURE — 99214 OFFICE O/P EST MOD 30 MIN: CPT | Performed by: FAMILY MEDICINE

## 2023-02-15 PROCEDURE — G8484 FLU IMMUNIZE NO ADMIN: HCPCS | Performed by: FAMILY MEDICINE

## 2023-02-15 PROCEDURE — 1123F ACP DISCUSS/DSCN MKR DOCD: CPT | Performed by: FAMILY MEDICINE

## 2023-02-15 PROCEDURE — G8427 DOCREV CUR MEDS BY ELIG CLIN: HCPCS | Performed by: FAMILY MEDICINE

## 2023-02-15 PROCEDURE — 1036F TOBACCO NON-USER: CPT | Performed by: FAMILY MEDICINE

## 2023-02-15 PROCEDURE — G8417 CALC BMI ABV UP PARAM F/U: HCPCS | Performed by: FAMILY MEDICINE

## 2023-02-15 SDOH — ECONOMIC STABILITY: INCOME INSECURITY: HOW HARD IS IT FOR YOU TO PAY FOR THE VERY BASICS LIKE FOOD, HOUSING, MEDICAL CARE, AND HEATING?: NOT HARD AT ALL

## 2023-02-15 SDOH — ECONOMIC STABILITY: HOUSING INSECURITY
IN THE LAST 12 MONTHS, WAS THERE A TIME WHEN YOU DID NOT HAVE A STEADY PLACE TO SLEEP OR SLEPT IN A SHELTER (INCLUDING NOW)?: NO

## 2023-02-15 SDOH — ECONOMIC STABILITY: FOOD INSECURITY: WITHIN THE PAST 12 MONTHS, THE FOOD YOU BOUGHT JUST DIDN'T LAST AND YOU DIDN'T HAVE MONEY TO GET MORE.: NEVER TRUE

## 2023-02-15 SDOH — ECONOMIC STABILITY: FOOD INSECURITY: WITHIN THE PAST 12 MONTHS, YOU WORRIED THAT YOUR FOOD WOULD RUN OUT BEFORE YOU GOT MONEY TO BUY MORE.: NEVER TRUE

## 2023-02-15 ASSESSMENT — PATIENT HEALTH QUESTIONNAIRE - PHQ9
9. THOUGHTS THAT YOU WOULD BE BETTER OFF DEAD, OR OF HURTING YOURSELF: 0
3. TROUBLE FALLING OR STAYING ASLEEP: 0
1. LITTLE INTEREST OR PLEASURE IN DOING THINGS: 0
10. IF YOU CHECKED OFF ANY PROBLEMS, HOW DIFFICULT HAVE THESE PROBLEMS MADE IT FOR YOU TO DO YOUR WORK, TAKE CARE OF THINGS AT HOME, OR GET ALONG WITH OTHER PEOPLE: 0
7. TROUBLE CONCENTRATING ON THINGS, SUCH AS READING THE NEWSPAPER OR WATCHING TELEVISION: 0
4. FEELING TIRED OR HAVING LITTLE ENERGY: 0
2. FEELING DOWN, DEPRESSED OR HOPELESS: 0
SUM OF ALL RESPONSES TO PHQ QUESTIONS 1-9: 0
SUM OF ALL RESPONSES TO PHQ9 QUESTIONS 1 & 2: 0
8. MOVING OR SPEAKING SO SLOWLY THAT OTHER PEOPLE COULD HAVE NOTICED. OR THE OPPOSITE, BEING SO FIGETY OR RESTLESS THAT YOU HAVE BEEN MOVING AROUND A LOT MORE THAN USUAL: 0
SUM OF ALL RESPONSES TO PHQ QUESTIONS 1-9: 0
5. POOR APPETITE OR OVEREATING: 0
6. FEELING BAD ABOUT YOURSELF - OR THAT YOU ARE A FAILURE OR HAVE LET YOURSELF OR YOUR FAMILY DOWN: 0

## 2023-02-15 ASSESSMENT — ENCOUNTER SYMPTOMS
CONSTIPATION: 0
DIARRHEA: 0
SHORTNESS OF BREATH: 0

## 2023-02-15 NOTE — CARE COORDINATION
CCSS reviewed patients reported daily Remote Patient Monitoring metrics. All reported metrics are within alert parameters. Plan/Follow Up:  Will continue to review, monitor and address alerts with follow up based on severity of symptoms and risk factors      Current Patient Metrics ---- Blood Pressure: 132/89, 74bpm Pulseox: 97%, 84bpm Survey: C Weight: 230.5lbs Note Created at: 02/15/2023 11:07 AM CT ---- Time-Spent: 2 minutes 0 seconds    Jhonny Treadwell, [address] [facility]   Cell: [phone]

## 2023-02-15 NOTE — PROGRESS NOTES
Chief Complaint   Patient presents with    Hypertension    Hyperlipidemia       HPI:  Danica Rojas is a 69 y.o. (: 1945) here today   for   Hyperlipidemia  This is a chronic problem. The current episode started more than 1 year ago. Pertinent negatives include no shortness of breath. Current antihyperlipidemic treatment includes statins. There are no compliance problems. Risk factors for coronary artery disease include dyslipidemia, hypertension and male sex. Hx of afib. HR seems to fluctuate. Last echo approx 1 mo ago. Ef 50-55%. Next  cardio appt . HR elevated on occas. Doing ok on eliquis. Doing ok w/ eliquis. Breathing near baseline w/ breo, other inhalers. Some pain in legs noted, enrike when lying down at night. Feet feel cold on occas. Patient's medications, allergies, past medical, surgical, social and family histories were reviewed and updated as appropriate. ROS:  Review of Systems   Constitutional:  Negative for fever. Respiratory:  Negative for shortness of breath. Cardiovascular:  Positive for palpitations. Gastrointestinal:  Negative for constipation and diarrhea.          LDL Calculated (mg/dL)   Date Value   2022 71       Past Medical History:   Diagnosis Date    Asthma     CAD (coronary artery disease)     Celiac sprue     COPD (chronic obstructive pulmonary disease) (HCC)     DDD (degenerative disc disease), lumbar     Fatty liver     HTN (hypertension)     Hyperlipidemia     Paget's disease     [facility]       Family History   Problem Relation Age of Onset    Other Mother         macular degeneration, DVT     Colon Cancer Father     Other Brother         celiac    High Blood Pressure Brother     Diabetes Brother     Heart Attack Maternal Grandmother     Heart Attack Maternal Grandfather     Stroke Maternal Grandfather     Heart Attack Paternal Grandfather     Other Paternal Uncle         stomach cancer       Social History Socioeconomic History    Marital status:      Spouse name: Not on file    Number of children: Not on file    Years of education: Not on file    Highest education level: Not on file   Occupational History    Occupation: [employer]   Tobacco Use    Smoking status: Former     Packs/day: 0.10     Years: 4.00     Pack years: 0.40     Types: Cigars, Cigarettes     Start date: 1964     Quit date: 1970     Years since quittin.1    Smokeless tobacco: Never   Substance and Sexual Activity    Alcohol use: No    Drug use: No    Sexual activity: Yes   Other Topics Concern    Not on file   Social History Narrative    Not on file     Social Determinants of Health     Financial Resource Strain: Low Risk     Difficulty of Paying Living Expenses: Not hard at all   Food Insecurity: No Food Insecurity    Worried About [employer] [employer] in the Last Year: Never true    [address] [employer] in the Last Year: Never true   Transportation Needs: Unknown    Lack of Transportation (Medical): Not on file    Lack of Transportation (Non-Medical): No   Physical Activity: Inactive    Days of Exercise per Week: 0 days    Minutes of Exercise per Session: 0 min   Stress: Not on file   Social Connections: Not on file   Intimate Partner Violence: Not on file   Housing Stability: Unknown    Unable to Pay for Housing in the Last Year: Not on file    Number of Places Lived in the Last Year: Not on file    Unstable Housing in the Last Year: No       Prior to Visit Medications    Medication Sig Taking? Authorizing Provider   apixaban (ELIQUIS) 5 MG TABS tablet Take 1 tablet by mouth 2 times daily Yes Jennifer Moore MD   Fluticasone furoate-vilanterol (BREO ELLIPTA) 200-25 MCG/INH AEPB inhaler INHALE ONE PUFF BY MOUTH DAILY Yes Jennifer Moore MD   omeprazole (PRILOSEC) 20 MG delayed release capsule Take 1 capsule by mouth in the morning.  Yes Jennifer Moore MD   albuterol sulfate HFA (PROVENTIL HFA) 108 (90 Base) MCG/ACT inhaler Inhale 2 puffs into the lungs every 6 hours as needed for Wheezing Yes Kobe Olivo MD   atorvastatin (LIPITOR) 20 MG tablet TAKE ONE TABLET BY MOUTH EVERY DAY Yes Kobe Olivo MD   Glucos-MSM-C-Jr-Yaxrhn-Wpmfsq (GLUCOSAMINE MSM COMPLEX PO) Take by mouth daily Yes Historical Provider, MD   B Complex-C (SUPER B COMPLEX PO) Take by mouth daily Yes Historical Provider, MD   Cholecalciferol (VITAMIN D3) 125 MCG (5000 UT) TABS Take by mouth daily Yes Historical Provider, MD   Omega 3-6-9 Fatty Acids (OMEGA-3-6-9 PO) Take by mouth daily Yes Historical Provider, MD   Melatonin 10 MG CAPS Take 10 mg by mouth nightly Yes Historical Provider, MD   Calcium-Magnesium-Vitamin D (CALCIUM MAGNESIUM PO) Take 1 tablet by mouth Daily. Dose is 1200/400  Yes Historical Provider, MD   acetaminophen (TYLENOL) 325 MG tablet Take 1,000 mg by mouth as needed. Yes Historical Provider, MD   therapeutic multivitamin-minerals (THERAGRAN-M) tablet Take 1 tablet by mouth daily. Yes Historical Provider, MD   polyethylene glycol (GLYCOLAX) 17 g packet Take 17 g by mouth daily as needed for Constipation  Historical Provider, MD       Allergies   Allergen Reactions    Bee Venom     Pcn [Penicillins] Swelling    Wheat Extract Other (See Comments)     Celiac disease       OBJECTIVE:    /72   Pulse 66   Ht 5' 11\" (1.803 m)   Wt 238 lb 6.4 oz (108.1 kg)   SpO2 97%   BMI 33.25 kg/m²     BP Readings from Last 2 Encounters:   02/15/23 122/72   12/30/22 122/82       Wt Readings from Last 3 Encounters:   02/15/23 238 lb 6.4 oz (108.1 kg)   12/30/22 234 lb (106.1 kg)   12/12/22 224 lb 14.4 oz (102 kg)       Physical Exam  Constitutional:       Appearance: He is well-developed. HENT:      Head: Normocephalic and atraumatic. Eyes:      Conjunctiva/sclera: Conjunctivae normal.   Neck:      Trachea: No tracheal deviation. Cardiovascular:      Rate and Rhythm: Normal rate. Rhythm irregular. Heart sounds: No murmur heard. No friction rub. No gallop. Pulmonary:      Effort: Pulmonary effort is normal.      Breath sounds: Normal breath sounds. Abdominal:      Palpations: Abdomen is soft. Tenderness: There is no abdominal tenderness. Musculoskeletal:      Right lower leg: No edema. Left lower leg: No edema. Skin:     General: Skin is warm and dry. Neurological:      General: No focal deficit present. Mental Status: He is alert and oriented to person, place, and time. Psychiatric:         Mood and Affect: Mood normal.         Behavior: Behavior normal.         ASSESSMENT/PLAN:    1. Paroxysmal atrial fibrillation (HCC)  Rate controlled. Still seems to be in afib. Rpt labs in the summer. F/u w/ cardio as sched. - Comprehensive Metabolic Panel; Future  - CBC with Auto Differential; Future  - TSH; Future  - T4, Free; Future    2. Chronic obstructive pulmonary disease, unspecified COPD type ([facility]  The current medical regimen is effective;  continue present plan and medications. Clara meds. 3. Prostate cancer ([facility]  Rpt psa w/ next lab. No new sxs. 4. Mixed hyperlipidemia  Rpt labs w/ next draw  - Lipid Panel; Future    5. Coronary artery disease involving native coronary artery of native heart without angina pectoris  No new issues. F/u w/ cardio as sched. 6. DDD (degenerative disc disease), lumbar  May be contrib to feet sxs. Leg and feet sxs do not seem c/w pad. Monitor. O/w near baseline. Some lower leg pain and feet getting cold. Mainly at night.

## 2023-02-15 NOTE — CARE COORDINATION
Remote Patient Monitoring Note      Date/Time:  2/15/2023 12:04 PM    CCSS reviewed patients reported daily Remote Patient Monitoring metrics. All reported metrics are within alert parameters. Plan/Follow Up:  Will continue to review, monitor and address alerts with follow up based on severity of symptoms and risk factors  ---- Current Patient Metrics ---- Blood Pressure: 132/89, 74bpm Pulseox: 97%, 84bpm Survey: C Weight: 230.5lbs Note Created at: 02/15/2023 12:04 PM ET ---- Time-Spent: 2 minutes 0 seconds

## 2023-02-16 ENCOUNTER — CARE COORDINATION (OUTPATIENT)
Dept: CARE COORDINATION | Age: 78
End: 2023-02-16

## 2023-02-16 NOTE — CARE COORDINATION
Remote Patient Monitoring Note      Date/Time:  2/16/2023 10:56 AM    CCSS reviewed patients reported daily Remote Patient Monitoring metrics. All reported metrics are within alert parameters. Plan/Follow Up:  Will continue to review, monitor and address alerts with follow up based on severity of symptoms and risk factors  ---- Current Patient Metrics ---- Blood Pressure: 146/84, 83bpm Pulseox: 97%, 70bpm Survey: C Weight: 230.0lbs Note Created at: 02/16/2023 10:56 AM ET ---- Time-Spent: 2 minutes 0 seconds

## 2023-02-17 ENCOUNTER — CARE COORDINATION (OUTPATIENT)
Dept: CARE COORDINATION | Age: 78
End: 2023-02-17

## 2023-02-17 NOTE — CARE COORDINATION
CCSS reviewed patients reported daily Remote Patient Monitoring metrics. All reported metrics are within alert parameters. Plan/Follow Up:  Will continue to review, monitor and address alerts with follow up based on severity of symptoms and risk factors     Current Patient Metrics ---- Blood Pressure: 152/90, 79bpm Pulseox: 98%, 71bpm Survey: C Weight: 231.0lbs Note Created at: 02/17/2023 11:24 AM CT ---- Time-Spent: 2 minutes 0 seconds    [address]   Cell: [phone]

## 2023-02-20 ENCOUNTER — CARE COORDINATION (OUTPATIENT)
Dept: CARE COORDINATION | Age: 78
End: 2023-02-20

## 2023-02-20 NOTE — CARE COORDINATION
Remote Patient Monitoring Note      Date/Time:  2/20/2023 2:54 PM    CCSS reviewed patients reported daily Remote Patient Monitoring metrics. All reported metrics are within alert parameters.     Plan/Follow Up: Will continue to review, monitor and address alerts with follow up based on severity of symptoms and risk factors  --- Current Patient Metrics ---- Blood Pressure: 139/97, 81bpm Pulseox: 98%, 57bpm Survey: C Weight: 229.5lbs Note Created at: 02/20/2023 02:54 PM ET ---- Time-Spent: 2 minutes 0 seconds

## 2023-02-21 ENCOUNTER — CARE COORDINATION (OUTPATIENT)
Dept: CARE COORDINATION | Age: 78
End: 2023-02-21

## 2023-02-21 NOTE — CARE COORDINATION
Remote Patient Monitoring Note      Date/Time:  2/21/2023 2:05 PM    CCSS reviewed patients reported daily Remote Patient Monitoring metrics. All reported metrics are within alert parameters. Plan/Follow Up:  Will continue to review, monitor and address alerts with follow up based on severity of symptoms and risk factors  --- Current Patient Metrics ---- Blood Pressure: 142/84, 73bpm Pulseox: 97%, 79bpm Survey: C Weight: 230.5lbs Note Created at: 02/21/2023 02:05 PM ET ---- Time-Spent: 2 minutes 0 seconds

## 2023-02-22 ENCOUNTER — CARE COORDINATION (OUTPATIENT)
Dept: CARE COORDINATION | Age: 78
End: 2023-02-22

## 2023-02-22 NOTE — CARE COORDINATION
CCSS reviewed patients reported daily Remote Patient Monitoring metrics. All reported metrics are within alert parameters. Plan/Follow Up:  Will continue to review, monitor and address alerts with follow up based on severity of symptoms and risk factors      Current Patient Metrics ---- Blood Pressure: 132/79, 80bpm Pulseox: 98%, 61bpm Survey: C Weight: 229.5lbs Note Created at: 02/22/2023 10:05 AM CT ---- Time-Spent: 2 minutes 0 seconds    Merline Lieu, [address] [facility]   Cell: [phone]

## 2023-02-23 ENCOUNTER — CARE COORDINATION (OUTPATIENT)
Dept: CARE COORDINATION | Age: 78
End: 2023-02-23

## 2023-02-23 NOTE — CARE COORDINATION
Remote Patient Monitoring Note      Date/Time:  2/23/2023 9:05 AM    CCSS reviewed patients reported daily Remote Patient Monitoring metrics. All reported metrics are within alert parameters. Plan/Follow Up:  Will continue to review, monitor and address alerts with follow up based on severity of symptoms and risk factors   ---- Current Patient Metrics ---- Blood Pressure: 125/88, 80bpm Pulseox: 98%, 77bpm Survey: C Weight: 229.5lbs Note Created at: 02/23/2023 09:06 AM ET ---- Time-Spent: 2 minutes 0 seconds

## 2023-02-24 ENCOUNTER — CARE COORDINATION (OUTPATIENT)
Dept: CARE COORDINATION | Age: 78
End: 2023-02-24

## 2023-02-24 NOTE — CARE COORDINATION
CCSS reviewed patients reported daily Remote Patient Monitoring metrics. All reported metrics are within alert parameters. Plan/Follow Up:  Will continue to review, monitor and address alerts with follow up based on severity of symptoms and risk factors      Current Patient Metrics ---- Blood Pressure: 145/81, 89bpm Pulseox: 97%, 60bpm Survey: C Weight: 229.5lbs Note Created at: 02/24/2023 12:16 PM CT ---- Time-Spent: 2 minutes 0 seconds    Merline Lieu, [place]   Cell: [phone]

## 2023-02-27 ENCOUNTER — CARE COORDINATION (OUTPATIENT)
Dept: CARE COORDINATION | Age: 78
End: 2023-02-27

## 2023-02-27 NOTE — CARE COORDINATION
Remote Patient Monitoring Note      Date/Time:  2/27/2023 8:21 AM    CCSS reviewed patients reported daily Remote Patient Monitoring metrics. All reported metrics are within alert parameters. Plan/Follow Up:  Will continue to review, monitor and address alerts with follow up based on severity of symptoms and risk factors  Current Patient Metrics ---- Blood Pressure: 132/94, 79bpm Pulseox: 95%, 75bpm Survey: C Weight: 229.5lbs Note Created at: 02/27/2023 08:21 AM ET ---- Time-Spent: 2 minutes 0 seconds

## 2023-02-28 ENCOUNTER — CARE COORDINATION (OUTPATIENT)
Dept: CARE COORDINATION | Age: 78
End: 2023-02-28

## 2023-02-28 NOTE — CARE COORDINATION
Remote Patient Monitoring Note      Date/Time:  2/28/2023 8:30 AM    CCSS reviewed patients reported daily Remote Patient Monitoring metrics. All reported metrics are within alert parameters. Plan/Follow Up:  Will continue to review, monitor and address alerts with follow up based on severity of symptoms and risk factors   Current Patient Metrics ---- Blood Pressure: 148/87, 74bpm Pulseox: 97%, 80bpm Survey: C Weight: 230.5lbs Note Created at: 02/28/2023 08:30 AM ET ---- Time-Spent: 2 minutes 0 seconds

## 2023-03-01 ENCOUNTER — CARE COORDINATION (OUTPATIENT)
Dept: CASE MANAGEMENT | Age: 78
End: 2023-03-01

## 2023-03-01 ENCOUNTER — CARE COORDINATION (OUTPATIENT)
Dept: CARE COORDINATION | Age: 78
End: 2023-03-01

## 2023-03-01 NOTE — CARE COORDINATION
Remote Patient Monitoring Note      Date/Time:  3/1/2023 10:33 AM    CCSS reviewed patients reported daily Remote Patient Monitoring metrics. All reported metrics are within alert parameters. Plan/Follow Up:  Will continue to review, monitor and address alerts with follow up based on severity of symptoms and risk factors  ---- Current Patient Metrics ---- Blood Pressure: 109/73, 91bpm Pulseox: 98%, 94bpm Survey: C Weight: 229.5lbs Note Created at: 03/01/2023 10:33 AM ET ---- Time-Spent: 2 minutes 0 seconds

## 2023-03-01 NOTE — CARE COORDINATION
Remote Alert Monitoring Note      Date/Time:  3/1/2023 10:03 AM  Patient Current Location: [facility]    LPN contacted family by telephone regarding yellow alert received for blood pressure reading (173/99). Verified patients name and  as identifiers. Background: RPM for HTN  Refer to 911 immediately if:  Patient unresponsive or unable to provide history  Change in cognition or sudden confusion  Patient unable to respond in complete sentences  Intense chest pain/tightness  Any concern for any clinical emergency  Red Alert: Provider response time of 1 hr required for any red alert requiring intervention  Yellow Alert: Provider response time of 3hr required for any escalated yellow alert    BP Triage  Are you having any Chest Pain? no   Are you having any Shortness of Breath? no   Do you have a headache or have any vision changes? no   Are you having any numbness or tingling? no   Are you having any other health concerns or issues? no       Clinical Interventions: Reviewed and followed up on alerts and treatments-LPN contacted wife in regards to RPM yellow alert for BP of 173/99. Wife denied that the pt c/o any symptoms this morning and stated that pt was out playing golf. Pt is on his way home and wife will have pt recheck BP. Writer will update. UPDATE- 10:39- Pt's updated BP is 109/73, WNL. Plan/Follow Up: Will continue to review, monitor and address alerts with follow up based on severity of symptoms and risk factors.   Hari Vasquez LPN, [facility]  PH: [phone]      Current Patient Metrics ---- Blood Pressure: 109/73, 91bpm Pulseox: 98%, 94bpm Survey: C Weight: 229.5lbs Note Created at: 2023 10:40 AM ET ---- Time-Spent: 10 minutes 0 seconds

## 2023-03-02 ENCOUNTER — CARE COORDINATION (OUTPATIENT)
Dept: CARE COORDINATION | Age: 78
End: 2023-03-02

## 2023-03-02 NOTE — CARE COORDINATION
Remote Patient Monitoring Note      Date/Time:  3/2/2023 8:39 AM    CCSS reviewed patients reported daily Remote Patient Monitoring metrics. All reported metrics are within alert parameters. Plan/Follow Up:  Will continue to review, monitor and address alerts with follow up based on severity of symptoms and risk factors   Current Patient Metrics ---- Blood Pressure: 122/84, 78bpm Pulseox: 98%, 80bpm Survey: C Weight: 230.0lbs Note Created at: 03/02/2023 08:40 AM ET ---- Time-Spent: 2 minutes 0 seconds

## 2023-03-03 ENCOUNTER — CARE COORDINATION (OUTPATIENT)
Dept: CARE COORDINATION | Age: 78
End: 2023-03-03

## 2023-03-03 NOTE — CARE COORDINATION
Remote Patient Monitoring Note      Date/Time:  3/3/2023 7:59 AM    CCSS reviewed patients reported daily Remote Patient Monitoring metrics. All reported metrics are within alert parameters. Plan/Follow Up:  Will continue to review, monitor and address alerts with follow up based on severity of symptoms and risk factors   Current Patient Metrics ---- Blood Pressure: 129/73, 66bpm Pulseox: 97%, 62bpm Survey: C Weight: 229.5lbs Note Created at: 03/03/2023 07:59 AM ET ---- Time-Spent: 2 minutes 0 seconds

## 2023-03-06 ENCOUNTER — CARE COORDINATION (OUTPATIENT)
Dept: CARE COORDINATION | Age: 78
End: 2023-03-06

## 2023-03-06 NOTE — CARE COORDINATION
Remote Patient Monitoring Note      Date/Time:  3/6/2023 8:15 AM    CCSS reviewed patients reported daily Remote Patient Monitoring metrics. All reported metrics are within alert parameters. Plan/Follow Up:  Will continue to review, monitor and address alerts with follow up based on severity of symptoms and risk factors  Current Patient Metrics ---- Blood Pressure: 130/73, 80bpm Pulseox: 97%, 73bpm Survey: C Weight: 229.0lbs Note Created at: 03/06/2023 08:16 AM ET ---- Time-Spent: 2 minutes 0 seconds

## 2023-03-07 ENCOUNTER — CARE COORDINATION (OUTPATIENT)
Dept: CARE COORDINATION | Age: 78
End: 2023-03-07

## 2023-03-07 NOTE — CARE COORDINATION
Remote Patient Monitoring Note      Date/Time:  3/7/2023 8:10 AM    CCSS reviewed patients reported daily Remote Patient Monitoring metrics. All reported metrics are within alert parameters. Plan/Follow Up:  Will continue to review, monitor and address alerts with follow up based on severity of symptoms and risk factors  - Current Patient Metrics ---- Blood Pressure: 133/75, 78bpm Pulseox: 98%, 86bpm Survey: C Weight: 229.5lbs Note Created at: 03/07/2023 08:11 AM ET ---- Time-Spent: 2 minutes 0 seconds

## 2023-03-08 ENCOUNTER — CARE COORDINATION (OUTPATIENT)
Dept: CARE COORDINATION | Age: 78
End: 2023-03-08

## 2023-03-09 ENCOUNTER — CARE COORDINATION (OUTPATIENT)
Dept: CARE COORDINATION | Age: 78
End: 2023-03-09

## 2023-03-09 NOTE — CARE COORDINATION
Remote Patient Monitoring Note      Date/Time:  3/9/2023 8:11 AM    CCSS reviewed patients reported daily Remote Patient Monitoring metrics. All reported metrics are within alert parameters. Plan/Follow Up:  Will continue to review, monitor and address alerts with follow up based on severity of symptoms and risk factors   Current Patient Metrics ---- Blood Pressure: 115/84, 76bpm Pulseox: 98%, 68bpm Survey: C Weight: 229.5lbs Note Created at: 03/09/2023 08:11 AM ET ---- Time-Spent: 2 minutes 0 seconds

## 2023-03-10 ENCOUNTER — CARE COORDINATION (OUTPATIENT)
Dept: CARE COORDINATION | Age: 78
End: 2023-03-10

## 2023-03-10 NOTE — CARE COORDINATION
Remote Patient Monitoring Note      Date/Time:  3/10/2023 8:53 AM    CCSS reviewed patients reported daily Remote Patient Monitoring metrics. All reported metrics are within alert parameters. Plan/Follow Up:  Will continue to review, monitor and address alerts with follow up based on severity of symptoms and risk factors   Current Patient Metrics ---- Blood Pressure: 136/89, 80bpm Pulseox: 97%, 67bpm Survey: C Weight: 229.5lbs Note Created at: 03/10/2023 08:53 AM ET ---- Time-Spent: 2 minutes 0 seconds

## 2023-03-13 ENCOUNTER — CARE COORDINATION (OUTPATIENT)
Dept: CARE COORDINATION | Age: 78
End: 2023-03-13

## 2023-03-13 NOTE — CARE COORDINATION
CCSS reviewed patients reported daily Remote Patient Monitoring metrics. All reported metrics are within alert parameters. Plan/Follow Up:  Will continue to review, monitor and address alerts with follow up based on severity of symptoms and risk factors      Current Patient Metrics ---- Blood Pressure: 128/87, 103bpm Pulseox: 97%, 75bpm Survey: C Weight: 230.0lbs Note Created at: 03/13/2023 12:38 PM CT ---- Time-Spent: 2 minutes 0 seconds    Andree Hermosillo   Cell: [phone]

## 2023-03-14 ENCOUNTER — CARE COORDINATION (OUTPATIENT)
Dept: CARE COORDINATION | Age: 78
End: 2023-03-14

## 2023-03-14 NOTE — CARE COORDINATION
Remote Patient Monitoring Note      Date/Time:  3/14/2023 8:54 AM    CCSS reviewed patients reported daily Remote Patient Monitoring metrics. All reported metrics are within alert parameters. Plan/Follow Up:  Will continue to review, monitor and address alerts with follow up based on severity of symptoms and risk factors  Current Patient Metrics ---- Blood Pressure: 132/93, 74bpm Pulseox: 98%, 83bpm Survey: C Weight: 230.0lbs Note Created at: 03/14/2023 08:54 AM ET ---- Time-Spent: 2 minutes 0 seconds

## 2023-03-28 ENCOUNTER — CARE COORDINATION (OUTPATIENT)
Dept: CASE MANAGEMENT | Age: 78
End: 2023-03-28

## 2023-03-28 ENCOUNTER — CARE COORDINATION (OUTPATIENT)
Dept: FAMILY MEDICINE CLINIC | Age: 78
End: 2023-03-28

## 2023-03-28 ENCOUNTER — CLINICAL DOCUMENTATION (OUTPATIENT)
Dept: CASE MANAGEMENT | Age: 78
End: 2023-03-28

## 2023-03-28 ASSESSMENT — ENCOUNTER SYMPTOMS: DYSPNEA ASSOCIATED WITH: EXERTION

## 2023-03-28 NOTE — CARE COORDINATION
Remote Alert Monitoring Note      Date/Time:  3/28/2023 8:40 AM  Patient Current Location: [address] [place] [address] Dr    LPN contacted patient by telephone regarding red alert received for blood pressure heart rate (44 bpm). Verified patients name and  as identifiers. Background: RPM for HTN  Refer to 911 immediately if:  Patient unresponsive or unable to provide history  Change in cognition or sudden confusion  Patient unable to respond in complete sentences  Intense chest pain/tightness  Any concern for any clinical emergency  Red Alert: Provider response time of 1 hr required for any red alert requiring intervention  Yellow Alert: Provider response time of 3hr required for any escalated yellow alert    HR Triage  Are you having any Chest Pain? no   Are you having any Shortness of Breath? no   Are you having any dizziness? no   Are you feeling more fatigued or tired than normal? no   Are you having any other health concerns or issues? no       Clinical Interventions: Reviewed and followed up on alerts and treatments-LPN contacted pt in regards to RPM red alert for BP HR of 44 bpm. Pt denied any worrisome symptoms and stated the he \"feels fine. \" Pt rechecked during call and new BP HR is 63 bpm, WNL. Plan/Follow Up: Will continue to review, monitor and address alerts with follow up based on severity of symptoms and risk factors.     Jc Paniagua LPN, [facility]  PH: [phone]    Current Patient Metrics ---- Blood Pressure: 168/90, 63bpm Pulseox: 97%, 88bpm Survey: C Weight: 229.0lbs Note Created at: 2023 08:43 AM ET ---- Time-Spent: 10 minutes 0 seconds

## 2023-03-28 NOTE — CARE COORDINATION
Ambulatory Care Coordination Note  3/28/2023    Patient Current Location:  Home: [place] [id number]     ACM contacted the patient by telephone. Verified name and  with patient as identifiers. Provided introduction to self, and explanation of the ACM role. Challenges to be reviewed by the provider   Additional needs identified to be addressed with provider: No  none               Method of communication with provider: none. ACM: Tracey Brandon RN    Reviewed medications  Reviewed COPD Zones  Reviewed upcoming appointments  Patient verbalized he would like to continue RPM until f/u appointment with Cardiology in April    PLAN:  Patient/spouse do not recall receiving hand outs  Will send the following:  COPD Zones  Learning About Atrial Fibrillation  Hypertension  COPD Triggers  COPD Exacerbation Plan  Taking Control of Your COPD  Asthma or COPD: Using a MDI  Will also send the following:      General Assessment    Do you have any symptoms that are causing concern?: No        COPD Assessment       No patient-reported symptoms         Symptoms:     Symptom course: stable  Breathlessness: exertion  Change in chronic cough?: No/At Baseline          Offered patient enrollment in the Remote Patient Monitoring (RPM) program for in-home monitoring: Yes, patient already enrolled. Care Coordination Interventions    Referral from Primary Care Provider: No  Suggested Interventions and Community Resources  Zone Management Tools:  In Process          Goals Addressed    None         Future Appointments   Date Time Provider Coretta Basurto   2023  2:45 PM Caridad Castaneda MD [employer] [facility]   2023  8:15 AM SCHEDULE, SEGUNDO WARNER  KAMAR WARNER [facility]   8/15/2023  8:20 AM MD KAMAR Ruiz CO [facility]

## 2023-04-11 PROCEDURE — 93228 REMOTE 30 DAY ECG REV/REPORT: CPT | Performed by: INTERNAL MEDICINE

## 2023-04-12 VITALS
BODY MASS INDEX: 31.38 KG/M2 | DIASTOLIC BLOOD PRESSURE: 83 MMHG | SYSTOLIC BLOOD PRESSURE: 148 MMHG | OXYGEN SATURATION: 97 % | WEIGHT: 225 LBS | HEART RATE: 81 BPM

## 2023-04-20 ENCOUNTER — PATIENT MESSAGE (OUTPATIENT)
Dept: CARDIOLOGY CLINIC | Age: 78
End: 2023-04-20

## 2023-04-20 ENCOUNTER — TELEPHONE (OUTPATIENT)
Dept: CARDIOLOGY CLINIC | Age: 78
End: 2023-04-20

## 2023-04-20 ENCOUNTER — HOSPITAL ENCOUNTER (OUTPATIENT)
Dept: CARDIAC CATH/INVASIVE PROCEDURES | Age: 78
Discharge: HOME | End: 2023-04-20
Attending: INTERNAL MEDICINE | Admitting: INTERNAL MEDICINE
Payer: MEDICARE

## 2023-04-20 VITALS — BODY MASS INDEX: 32.19 KG/M2 | WEIGHT: 230.8 LBS

## 2023-04-20 LAB
ANION GAP SERPL CALCULATED.3IONS-SCNC: 5 MMOL/L (ref 3–16)
BUN SERPL-MCNC: 13 MG/DL (ref 7–20)
CALCIUM SERPL-MCNC: 9.2 MG/DL (ref 8.3–10.6)
CHLORIDE SERPL-SCNC: 107 MMOL/L (ref 99–110)
CO2 SERPL-SCNC: 29 MMOL/L (ref 21–32)
CREAT SERPL-MCNC: 0.9 MG/DL (ref 0.8–1.3)
DEPRECATED RDW RBC AUTO: 13.4 % (ref 12.4–15.4)
EKG ATRIAL RATE: 182 BPM
EKG ATRIAL RATE: 69 BPM
EKG DIAGNOSIS: NORMAL
EKG DIAGNOSIS: NORMAL
EKG P AXIS: 72 DEGREES
EKG P-R INTERVAL: 218 MS
EKG Q-T INTERVAL: 408 MS
EKG Q-T INTERVAL: 428 MS
EKG QRS DURATION: 82 MS
EKG QRS DURATION: 84 MS
EKG QTC CALCULATION (BAZETT): 440 MS
EKG QTC CALCULATION (BAZETT): 458 MS
EKG R AXIS: -6 DEGREES
EKG R AXIS: 4 DEGREES
EKG T AXIS: 28 DEGREES
EKG T AXIS: 7 DEGREES
EKG VENTRICULAR RATE: 69 BPM
EKG VENTRICULAR RATE: 70 BPM
GFR SERPLBLD CREATININE-BSD FMLA CKD-EPI: >60 ML/MIN/{1.73_M2}
GLUCOSE SERPL-MCNC: 109 MG/DL (ref 70–99)
HCT VFR BLD AUTO: 39.5 % (ref 40.5–52.5)
HGB BLD-MCNC: 13.6 G/DL (ref 13.5–17.5)
MCH RBC QN AUTO: 33.3 PG (ref 26–34)
MCHC RBC AUTO-ENTMCNC: 34.4 G/DL (ref 31–36)
MCV RBC AUTO: 96.8 FL (ref 80–100)
PLATELET # BLD AUTO: 184 K/UL (ref 135–450)
PMV BLD AUTO: 8.2 FL (ref 5–10.5)
POTASSIUM SERPL-SCNC: 4.4 MMOL/L (ref 3.5–5.1)
RBC # BLD AUTO: 4.08 M/UL (ref 4.2–5.9)
SODIUM SERPL-SCNC: 141 MMOL/L (ref 136–145)
WBC # BLD AUTO: 4.2 K/UL (ref 4–11)

## 2023-04-20 PROCEDURE — 93005 ELECTROCARDIOGRAM TRACING: CPT | Performed by: INTERNAL MEDICINE

## 2023-04-20 PROCEDURE — 92960 CARDIOVERSION ELECTRIC EXT: CPT | Performed by: INTERNAL MEDICINE

## 2023-04-20 PROCEDURE — 92960 CARDIOVERSION ELECTRIC EXT: CPT

## 2023-04-20 PROCEDURE — 85027 COMPLETE CBC AUTOMATED: CPT

## 2023-04-20 PROCEDURE — 6360000002 HC RX W HCPCS

## 2023-04-20 PROCEDURE — 93010 ELECTROCARDIOGRAM REPORT: CPT | Performed by: INTERNAL MEDICINE

## 2023-04-20 PROCEDURE — 2500000003 HC RX 250 WO HCPCS

## 2023-04-20 PROCEDURE — 99152 MOD SED SAME PHYS/QHP 5/>YRS: CPT | Performed by: INTERNAL MEDICINE

## 2023-04-20 PROCEDURE — 80048 BASIC METABOLIC PNL TOTAL CA: CPT

## 2023-04-20 RX ORDER — SODIUM CHLORIDE 0.9 % (FLUSH) 0.9 %
5-40 SYRINGE (ML) INJECTION EVERY 12 HOURS SCHEDULED
Status: DISCONTINUED | OUTPATIENT
Start: 2023-04-20 | End: 2023-04-20 | Stop reason: HOSPADM

## 2023-04-20 RX ORDER — ONDANSETRON 2 MG/ML
4 INJECTION INTRAMUSCULAR; INTRAVENOUS EVERY 6 HOURS PRN
Status: DISCONTINUED | OUTPATIENT
Start: 2023-04-20 | End: 2023-04-20 | Stop reason: HOSPADM

## 2023-04-20 RX ORDER — SODIUM CHLORIDE 9 MG/ML
INJECTION, SOLUTION INTRAVENOUS PRN
Status: DISCONTINUED | OUTPATIENT
Start: 2023-04-20 | End: 2023-04-20 | Stop reason: HOSPADM

## 2023-04-20 RX ORDER — LORAZEPAM 0.5 MG/1
0.5 TABLET ORAL
Status: DISCONTINUED | OUTPATIENT
Start: 2023-04-20 | End: 2023-04-20 | Stop reason: HOSPADM

## 2023-04-20 RX ORDER — SODIUM CHLORIDE 0.9 % (FLUSH) 0.9 %
5-40 SYRINGE (ML) INJECTION PRN
Status: DISCONTINUED | OUTPATIENT
Start: 2023-04-20 | End: 2023-04-20 | Stop reason: HOSPADM

## 2023-04-20 NOTE — TELEPHONE ENCOUNTER
Called and spoke with patient's wife. Patient had cardioversion today 4/20/2023 with GREG. Per home ECG monitor around 1PM it appeared patient was back in AF. She states the rate has been controlled and the patient is asymptomatic. She will send strips through [employer]. Once received please send to [facility] for review. GREG MCMAHAN--will be sending [employer] message with ECG strips attached once they send them for your review.

## 2023-04-20 NOTE — H&P
Neurological: He is alert and oriented to person, place, and time. Skin: Skin is warm and dry. Psychiatric: He has a normal mood and affect. Assessment:  AF of unknown duration - patient formally diagnosed with AF in 12/2022. Unknown of AF prior to this as he is asymptomatic. Plan for cardioversion.    AV block - per ECG        Plan:  Discussed the risks and benefits of a cardioversion (literature provided)  [facility] after [facility]  Continue taking current medication as prescribed   Follow up with me after the procedure              Nanette Correa M.D.

## 2023-04-20 NOTE — PROCEDURES
Anesthesia: versed 1 mg, brevital 40 mg  Complications: none apparent  Findings: successful conversion to sinus rhythm with 150 J synchronized CV shock. Uneventful recovery.

## 2023-04-20 NOTE — SEDATION DOCUMENTATION
Sedation start time: 0851  Sedation stop time: 0903  Mallampati: 3  Pre and post Justin score: 10/10  Medication given: IV Versed 1 mg, IV Brevital 40 mg  Pre-Procedure Assessment / Plan:  ASA: Class 2 - A normal healthy patient with mild systemic disease  Level of Sedation Plan:Deep sedation  Post Procedure plan: Return to same level of care     An independent trained observer pushed medications at my direction. We monitored the patient's level of consciousness and vital signs/physiologic status throughout the procedure duration (see start and start times above).

## 2023-04-20 NOTE — TELEPHONE ENCOUNTER
From: Zuly Leroy  To: Dr. Calvin Cavazos: 2023 3:32 PM EDT  Subject: Strips post cardioversion    I'm having all kinds of problems trying to send this strip. I will keep trying, but may take me awhile.  This is for Chikis Price  [birth year]

## 2023-04-21 NOTE — TELEPHONE ENCOUNTER
Does pt need to keep the hsfu with [facility] per JMKAELYN (according to appt notes, scheduled by hospital) at [address] [place] [address] for 5/17/23, and see Anjana Leon on 5/30/23 at [facility]? I left a vm for bashir at [phone] informing to call back to schedule with JMB at [facility] [place] for 5/30/23 at 345pm ok'd per alonzo.

## 2023-04-21 NOTE — TELEPHONE ENCOUNTER
Carol Feeling returning call to [facility]. Pt is now scheduled with GREG on 05/30/23 @3:45pm (overbook) per RN JACK to discuss other options for the AF.

## 2023-04-21 NOTE — TELEPHONE ENCOUNTER
Amy Matthews MD  You 2 minutes ago (11:55 AM)     Ronel Eastman  He has recurrent AF and will need an OV to discuss options        Patient is back in AF. Please schedule at 3:45 on 05/30/2023 to discuss at [address] w/ GREG.

## 2023-04-30 ENCOUNTER — PATIENT MESSAGE (OUTPATIENT)
Dept: CARDIOLOGY CLINIC | Age: 78
End: 2023-04-30

## 2023-05-01 NOTE — TELEPHONE ENCOUNTER
Andra Dozier MD  You 6 minutes ago (1:00 PM)     Roshni Gloss  Significant baseline artifact, but appears more regular than the strip from 4/20/23. I see no negative dromotropic agents on the med list. Lets get a 12 lead when he gets back in to town.

## 2023-05-01 NOTE — TELEPHONE ENCOUNTER
From: Elida Kunz  To: Dr. Blount Sascha: 4/30/2023 3:05 PM EDT  Subject: Vidya Johns Ekg strip from 4/30/23    His heart rate has been mid 50's for about 5 days now and looking at this strip, I can't tell what it is maybe a block of some sort??? Will attach the EKG and this is pretty much what it's looked like for a few days. It is from 4/30/23 not 4/3. I missed the zero. We are going to be out of town for a couple of days. If Dr. Erick Dowling or who ever looks at this thinks we need do something different until Tuesday, please call Tera's phone at [phone].  Thank you

## 2023-05-04 ENCOUNTER — NURSE ONLY (OUTPATIENT)
Dept: FAMILY MEDICINE CLINIC | Age: 78
End: 2023-05-04
Payer: MEDICARE

## 2023-05-04 DIAGNOSIS — I48.0 PAROXYSMAL ATRIAL FIBRILLATION (HCC): Primary | ICD-10-CM

## 2023-05-04 PROCEDURE — 93000 ELECTROCARDIOGRAM COMPLETE: CPT | Performed by: FAMILY MEDICINE

## 2023-05-05 ENCOUNTER — PATIENT MESSAGE (OUTPATIENT)
Dept: CARDIOLOGY CLINIC | Age: 78
End: 2023-05-05

## 2023-05-05 NOTE — TELEPHONE ENCOUNTER
From: Myriam Adam  To: Dr. Cannon Score: 2023 1:08 PM EDT  Subject: Carlos Castellanos EKG results    Just wondering if Tera's EKG results are read yet?   9-  Thank you

## 2023-05-05 NOTE — TELEPHONE ENCOUNTER
[address] please review and advise--EKG 5/4/2023 under cardiology tab. Thanks.

## 2023-05-15 ENCOUNTER — CARE COORDINATION (OUTPATIENT)
Dept: CARE COORDINATION | Age: 78
End: 2023-05-15

## 2023-05-15 SDOH — ECONOMIC STABILITY: TRANSPORTATION INSECURITY
IN THE PAST 12 MONTHS, HAS LACK OF TRANSPORTATION KEPT YOU FROM MEETINGS, WORK, OR FROM GETTING THINGS NEEDED FOR DAILY LIVING?: NO

## 2023-05-15 SDOH — ECONOMIC STABILITY: HOUSING INSECURITY: IN THE LAST 12 MONTHS, HOW MANY PLACES HAVE YOU LIVED?: 1

## 2023-05-15 SDOH — ECONOMIC STABILITY: INCOME INSECURITY: IN THE LAST 12 MONTHS, WAS THERE A TIME WHEN YOU WERE NOT ABLE TO PAY THE MORTGAGE OR RENT ON TIME?: NO

## 2023-05-15 NOTE — CARE COORDINATION
Ambulatory Care Coordination Note  5/15/2023    Patient [place] Location:  [facility]  [id number]     ACM contacted the patient by telephone. Verified name and  with patient as identifiers. Provided introduction to self, and explanation of the ACM role. Challenges to be reviewed by the provider   Additional needs identified to be addressed with provider: No  none    Education and goals completed  Patient has ACM contact information  CC grad  No further outreach scheduled with this ACM; episode of care resolved             Method of communication with provider: none. ACM: Gerda Scheuermann, RN    Reviewed COPD/CHF zones      Offered patient enrollment in the Remote Patient Monitoring (RPM) program for in-home monitoring:  patient graduated from RPM .    Congestive Heart Failure Assessment    Do you understand a low sodium diet?: Yes  Do you understand how to read food labels?: Yes         Symptoms:  CHF associated fatigue: Pos      Symptom course: stable  Patient-reported weight (lb):  (Comment: 222lb)  Weight trend: stable       COPD Assessment       No patient-reported symptoms         Symptoms:             Care Coordination Interventions    Referral from Primary Care Provider: No  Suggested Interventions and Community Resources  Zone Management Tools: In Process          Goals Addressed                   This Visit's Progress       Care Coordination     COMPLETED: Self Monitoring        Daily Weights - I will weight myself as directed - Daily and write down weights  I will notify my provider of any increase in weight by 3 or more pounds in 2 days OR 5 or more pounds in a week. Blood Pressure - I will take my blood pressure as directed - Daily  Other Self-Monitoring - I will monitor my Heart rate and O2 Sats - Daily    Patient Reported Blood Pressure No flowsheet data found. Patient Reported Weight No flowsheet data found. See CC note/RPM    Barriers: none  Plan for overcoming my

## 2023-05-23 DIAGNOSIS — I48.91 NEW ONSET ATRIAL FIBRILLATION (HCC): ICD-10-CM

## 2023-05-30 ENCOUNTER — OFFICE VISIT (OUTPATIENT)
Dept: CARDIOLOGY CLINIC | Age: 78
End: 2023-05-30

## 2023-05-30 VITALS
HEART RATE: 68 BPM | WEIGHT: 226.4 LBS | HEIGHT: 71 IN | DIASTOLIC BLOOD PRESSURE: 68 MMHG | SYSTOLIC BLOOD PRESSURE: 126 MMHG | OXYGEN SATURATION: 97 % | BODY MASS INDEX: 31.69 KG/M2

## 2023-05-30 DIAGNOSIS — I48.91 NEW ONSET A-FIB (HCC): Primary | ICD-10-CM

## 2023-05-30 NOTE — PROGRESS NOTES
[facility]   Cardiac Consultation    Date: 5/30/23  Patient Name: Lois Beckwith  YOB: 1945    Primary Care Physician: Leonora Bhatt MD    CHIEF COMPLAINT:   Chief Complaint   Patient presents with    Follow-up    Atrial Fibrillation       HPI:  Lois Beckwith is a 69 y.o. male who presents for evaluation of atrial fibrillation. The patient has a past medical history of hypertension, hyperlipidemia, COPD, prostate cancer, coronary artery disease, and atrial fibrillation. He underwent cardiac catheterization in 03/2016, which demonstrated only minimal luminal irregularities and no fixed obstructive coronary artery disease. Left ventricular function at that time was normal. The patient presented for elective colonoscopy on 12/12/2022 and an irregular heart beat was identified. He was referred to the emergency department where ECG demonstrates typical appearing  atrial fibrillation with heart rate at 72 beats per minute. His prior ECG from 02/20/2019 demonstrated sinus rhythm. Patient wore a cardiac event monitor from 12/13/2022 to 01/10/2023 which demonstrated predominately AF (100 % burden) with an average HR of 82 (). PVC burden 1.81%. On 04/11/2023 he reported that he is feeling well overall. He has not missed any doses of Eliquis recently. His wife reported that his heart rate prior to being diagnosed with AF was in the 40's- 50's. He stated he monitors his blood pressure and heart rate at home. He reported he was provided with a blood pressure machine that relays information to his doctor. The readings from this machine vary from the blood pressures taken in person. He stated that last week he went for a hike up a grade. He had to stop hiking due to shortness of breath. On 04/20/2023 he underwent successful cardioversion. He reportedly went back into AF less than 24 hours later. Upon reviewing [employer] tracings  it is unclear if this is accurate.    Today,

## 2023-06-05 DIAGNOSIS — I48.0 PAROXYSMAL ATRIAL FIBRILLATION (HCC): ICD-10-CM

## 2023-06-05 RX ORDER — APIXABAN 5 MG/1
TABLET, FILM COATED ORAL
Qty: 180 TABLET | Refills: 3 | Status: SHIPPED | OUTPATIENT
Start: 2023-06-05 | End: 2023-09-03

## 2023-08-09 ENCOUNTER — NURSE ONLY (OUTPATIENT)
Dept: FAMILY MEDICINE CLINIC | Age: 78
End: 2023-08-09
Payer: MEDICARE

## 2023-08-09 DIAGNOSIS — C61 PROSTATE CANCER (HCC): ICD-10-CM

## 2023-08-09 DIAGNOSIS — I48.0 PAROXYSMAL ATRIAL FIBRILLATION (HCC): ICD-10-CM

## 2023-08-09 DIAGNOSIS — E78.2 MIXED HYPERLIPIDEMIA: ICD-10-CM

## 2023-08-09 LAB
ALBUMIN SERPL-MCNC: 4.3 G/DL (ref 3.4–5)
ALBUMIN/GLOB SERPL: 2 {RATIO} (ref 1.1–2.2)
ALP SERPL-CCNC: 101 U/L (ref 40–129)
ALT SERPL-CCNC: 18 U/L (ref 10–40)
ANION GAP SERPL CALCULATED.3IONS-SCNC: 14 MMOL/L (ref 3–16)
AST SERPL-CCNC: 23 U/L (ref 15–37)
BASOPHILS # BLD: 0 K/UL (ref 0–0.2)
BASOPHILS NFR BLD: 1.1 %
BILIRUB SERPL-MCNC: 0.4 MG/DL (ref 0–1)
BUN SERPL-MCNC: 11 MG/DL (ref 7–20)
CALCIUM SERPL-MCNC: 8.8 MG/DL (ref 8.3–10.6)
CHLORIDE SERPL-SCNC: 105 MMOL/L (ref 99–110)
CHOLEST SERPL-MCNC: 122 MG/DL (ref 0–199)
CO2 SERPL-SCNC: 22 MMOL/L (ref 21–32)
CREAT SERPL-MCNC: 0.7 MG/DL (ref 0.8–1.3)
DEPRECATED RDW RBC AUTO: 13.4 % (ref 12.4–15.4)
EOSINOPHIL # BLD: 0.1 K/UL (ref 0–0.6)
EOSINOPHIL NFR BLD: 3 %
GFR SERPLBLD CREATININE-BSD FMLA CKD-EPI: >60 ML/MIN/{1.73_M2}
GLUCOSE SERPL-MCNC: 91 MG/DL (ref 70–99)
HCT VFR BLD AUTO: 37.7 % (ref 40.5–52.5)
HDLC SERPL-MCNC: 53 MG/DL (ref 40–60)
HGB BLD-MCNC: 13.1 G/DL (ref 13.5–17.5)
LDLC SERPL CALC-MCNC: 57 MG/DL
LYMPHOCYTES # BLD: 1.1 K/UL (ref 1–5.1)
LYMPHOCYTES NFR BLD: 28.2 %
MCH RBC QN AUTO: 33.6 PG (ref 26–34)
MCHC RBC AUTO-ENTMCNC: 34.9 G/DL (ref 31–36)
MCV RBC AUTO: 96.4 FL (ref 80–100)
MONOCYTES # BLD: 0.4 K/UL (ref 0–1.3)
MONOCYTES NFR BLD: 9.8 %
NEUTROPHILS # BLD: 2.2 K/UL (ref 1.7–7.7)
NEUTROPHILS NFR BLD: 57.9 %
PLATELET # BLD AUTO: 191 K/UL (ref 135–450)
PMV BLD AUTO: 9.8 FL (ref 5–10.5)
POTASSIUM SERPL-SCNC: 3.9 MMOL/L (ref 3.5–5.1)
PROT SERPL-MCNC: 6.5 G/DL (ref 6.4–8.2)
PSA SERPL DL<=0.01 NG/ML-MCNC: <0.01 NG/ML (ref 0–4)
RBC # BLD AUTO: 3.91 M/UL (ref 4.2–5.9)
SODIUM SERPL-SCNC: 141 MMOL/L (ref 136–145)
T4 FREE SERPL-MCNC: 1.1 NG/DL (ref 0.9–1.8)
TRIGL SERPL-MCNC: 61 MG/DL (ref 0–150)
TSH SERPL DL<=0.005 MIU/L-ACNC: 3.85 UIU/ML (ref 0.27–4.2)
VLDLC SERPL CALC-MCNC: 12 MG/DL
WBC # BLD AUTO: 3.8 K/UL (ref 4–11)

## 2023-08-09 PROCEDURE — 36415 COLL VENOUS BLD VENIPUNCTURE: CPT | Performed by: FAMILY MEDICINE

## 2023-08-09 NOTE — PROGRESS NOTES
Blood drawn per order. Needle size: 23 g  Site: L Antecubital.  First attempt successful No    Second attempt yes, Right hand    Pressure applied until bleeding stopped. Pressure applied. Patient informed to call office or return if bleeding reoccurs and unable to stop.     Tubes drawn: 1 purple     1 red

## 2023-08-10 NOTE — RESULT ENCOUNTER NOTE
CMP is okay. Lipids well-controlled. Her blood cell count mildly low, it is been in a similar range in the past.  Mild anemia. PSA appropriately low. Thyroid labs okay.   Can discuss further at upcoming appointment

## 2023-08-15 ENCOUNTER — OFFICE VISIT (OUTPATIENT)
Dept: FAMILY MEDICINE CLINIC | Age: 78
End: 2023-08-15

## 2023-08-15 VITALS
WEIGHT: 229.8 LBS | HEART RATE: 54 BPM | DIASTOLIC BLOOD PRESSURE: 64 MMHG | HEIGHT: 71 IN | OXYGEN SATURATION: 98 % | BODY MASS INDEX: 32.17 KG/M2 | SYSTOLIC BLOOD PRESSURE: 122 MMHG

## 2023-08-15 DIAGNOSIS — I25.10 CORONARY ARTERY DISEASE INVOLVING NATIVE CORONARY ARTERY OF NATIVE HEART WITHOUT ANGINA PECTORIS: ICD-10-CM

## 2023-08-15 DIAGNOSIS — C61 PROSTATE CANCER (HCC): ICD-10-CM

## 2023-08-15 DIAGNOSIS — D64.9 ANEMIA, UNSPECIFIED TYPE: ICD-10-CM

## 2023-08-15 DIAGNOSIS — I48.0 PAROXYSMAL ATRIAL FIBRILLATION (HCC): Primary | ICD-10-CM

## 2023-08-15 DIAGNOSIS — J44.9 CHRONIC OBSTRUCTIVE PULMONARY DISEASE, UNSPECIFIED COPD TYPE (HCC): ICD-10-CM

## 2023-08-15 DIAGNOSIS — E78.2 MIXED HYPERLIPIDEMIA: ICD-10-CM

## 2023-08-15 ASSESSMENT — ENCOUNTER SYMPTOMS: SHORTNESS OF BREATH: 1

## 2023-08-15 NOTE — PROGRESS NOTES
Shila Mcneal (:  1945) is a 69 y.o. male,Established patient, here for evaluation of the following chief complaint(s):  Hyperlipidemia         ASSESSMENT/PLAN:  {There are no diagnoses linked to this encounter. (Refresh or delete this SmartLink)}             Subjective   SUBJECTIVE/OBJECTIVE:  HPI  Has difficulty sleeping. Wakes up in the middle of the night multiple times. On average 7-8 hours of sleep. Tried benadryl and sleep aid has helped. Review of Systems       Objective   Physical Exam           An electronic signature was used to authenticate this note.     --Valery Lunsford

## 2023-08-15 NOTE — PROGRESS NOTES
Chief Complaint   Patient presents with    Hyperlipidemia       HPI:  Maryann Tesfaye is a 69 y.o. (: 1945) here today   for   Hyperlipidemia  This is a chronic problem. The current episode started more than 1 year ago. Associated symptoms include shortness of breath. Pertinent negatives include no chest pain. Current antihyperlipidemic treatment includes statins. There are no compliance problems. Risk factors for coronary artery disease include dyslipidemia and male sex. Afib. Seen by cardio approx 3 mo ago. Has been using home monitor. Still on eliquis. Clara well. Seems to have been in sinus rhythm for some time. Breathing near baseline. Rarely using albuterol. Clara breo. Has not been seeing prostate specialist. Remote hx of cancer. Wbc and hgb low again. No new sxs. Mild anemia. Patient's medications, allergies, past medical, surgical, social and family histories were reviewed and updated as appropriate. ROS:  Review of Systems   Constitutional:  Negative for fever. Respiratory:  Positive for shortness of breath. Cardiovascular:  Negative for chest pain and palpitations.          LDL Calculated (mg/dL)   Date Value   2023 57       Past Medical History:   Diagnosis Date    Asthma     CAD (coronary artery disease)     Celiac sprue     COPD (chronic obstructive pulmonary disease) (HCC)     DDD (degenerative disc disease), lumbar     Fatty liver     HTN (hypertension)     Hyperlipidemia     Paget's disease     Prostate cancer ([facility]) 2009       Family History   Problem Relation Age of Onset    Other Mother         macular degeneration, DVT     Colon Cancer Father     Other Brother         celiac    High Blood Pressure Brother     Diabetes Brother     Heart Attack Maternal Grandmother     Heart Attack Maternal Grandfather     Stroke Maternal Grandfather     Heart Attack Paternal Grandfather     Other Paternal Uncle         stomach cancer       Social History

## 2023-09-05 DIAGNOSIS — J44.9 CHRONIC OBSTRUCTIVE PULMONARY DISEASE, UNSPECIFIED COPD TYPE (HCC): ICD-10-CM

## 2023-09-05 RX ORDER — FLUTICASONE FUROATE AND VILANTEROL TRIFENATATE 200; 25 UG/1; UG/1
POWDER RESPIRATORY (INHALATION)
Qty: 60 EACH | Refills: 11 | Status: SHIPPED | OUTPATIENT
Start: 2023-09-05

## 2023-10-02 ENCOUNTER — OFFICE VISIT (OUTPATIENT)
Dept: CARDIOLOGY CLINIC | Age: 78
End: 2023-10-02
Payer: MEDICARE

## 2023-10-02 VITALS
BODY MASS INDEX: 31.36 KG/M2 | OXYGEN SATURATION: 98 % | WEIGHT: 224 LBS | HEIGHT: 71 IN | DIASTOLIC BLOOD PRESSURE: 76 MMHG | SYSTOLIC BLOOD PRESSURE: 122 MMHG | HEART RATE: 83 BPM

## 2023-10-02 DIAGNOSIS — K21.9 GASTROESOPHAGEAL REFLUX DISEASE WITHOUT ESOPHAGITIS: ICD-10-CM

## 2023-10-02 DIAGNOSIS — I48.91 NEW ONSET A-FIB (HCC): ICD-10-CM

## 2023-10-02 DIAGNOSIS — E78.2 MIXED HYPERLIPIDEMIA: ICD-10-CM

## 2023-10-02 DIAGNOSIS — I10 ESSENTIAL HYPERTENSION: Primary | ICD-10-CM

## 2023-10-02 PROCEDURE — 1036F TOBACCO NON-USER: CPT | Performed by: NURSE PRACTITIONER

## 2023-10-02 PROCEDURE — 1123F ACP DISCUSS/DSCN MKR DOCD: CPT | Performed by: NURSE PRACTITIONER

## 2023-10-02 PROCEDURE — 3078F DIAST BP <80 MM HG: CPT | Performed by: NURSE PRACTITIONER

## 2023-10-02 PROCEDURE — G8417 CALC BMI ABV UP PARAM F/U: HCPCS | Performed by: NURSE PRACTITIONER

## 2023-10-02 PROCEDURE — G8484 FLU IMMUNIZE NO ADMIN: HCPCS | Performed by: NURSE PRACTITIONER

## 2023-10-02 PROCEDURE — 99214 OFFICE O/P EST MOD 30 MIN: CPT | Performed by: NURSE PRACTITIONER

## 2023-10-02 PROCEDURE — 93000 ELECTROCARDIOGRAM COMPLETE: CPT | Performed by: NURSE PRACTITIONER

## 2023-10-02 PROCEDURE — G8427 DOCREV CUR MEDS BY ELIG CLIN: HCPCS | Performed by: NURSE PRACTITIONER

## 2023-10-02 PROCEDURE — 3074F SYST BP LT 130 MM HG: CPT | Performed by: NURSE PRACTITIONER

## 2023-10-02 RX ORDER — ATORVASTATIN CALCIUM 20 MG/1
TABLET, FILM COATED ORAL
Qty: 90 TABLET | Refills: 0 | Status: SHIPPED | OUTPATIENT
Start: 2023-10-02

## 2023-10-02 RX ORDER — OMEPRAZOLE 20 MG/1
20 CAPSULE, DELAYED RELEASE ORAL DAILY
Qty: 90 CAPSULE | Refills: 0 | Status: SHIPPED | OUTPATIENT
Start: 2023-10-02 | End: 2023-12-31

## 2023-10-02 NOTE — PATIENT INSTRUCTIONS
No changes today     Utilize [employer] to monitor heart rate and rhythm. I would you to keep a journal and monitor symptoms. CT calcium score to evaluate coronary arteries. This will help us choose the right medication for you. Call [phone] to schedule.      Follow up in 3 months

## 2023-10-09 ENCOUNTER — TELEPHONE (OUTPATIENT)
Dept: CARDIOLOGY CLINIC | Age: 78
End: 2023-10-09

## 2023-10-09 NOTE — TELEPHONE ENCOUNTER
Central trying to schedule CT calcium score test and the questionnaire they need to ask pt wants to know if testing is for A-fib and if so test should be a CTA not calcium score. Please advise.

## 2023-10-16 ENCOUNTER — HOSPITAL ENCOUNTER (OUTPATIENT)
Dept: CT IMAGING | Age: 78
Discharge: HOME | End: 2023-10-16
Payer: MEDICARE

## 2023-10-16 DIAGNOSIS — I48.91 NEW ONSET A-FIB (HCC): ICD-10-CM

## 2023-10-16 PROCEDURE — 75571 CT HRT W/O DYE W/CA TEST: CPT

## 2023-10-17 NOTE — RESULT ENCOUNTER NOTE
I shared his results with him and his wife. Front,     Please arrange for him to see Dr. Saranya Cooney in [facility]. Thanks.

## 2023-10-18 ENCOUNTER — TELEPHONE (OUTPATIENT)
Dept: CARDIOLOGY CLINIC | Age: 78
End: 2023-10-18

## 2023-10-18 NOTE — TELEPHONE ENCOUNTER
Trinidad Calixto, APRN - CNP  P [facility] shared his results with him and his wife. Front,     Please arrange for him to see Dr. Gabriel Barrera in [facility]. Thanks. **This will be a new pt with [facility]. Please provide date/time at [facility] with [facility] per NPAM and route back to .

## 2023-10-18 NOTE — TELEPHONE ENCOUNTER
Called and spoke with patient. Appt made at [facility] per AM NP 10/25/23 pt VU time,date, and location.

## 2023-10-25 ENCOUNTER — OFFICE VISIT (OUTPATIENT)
Dept: CARDIOLOGY CLINIC | Age: 78
End: 2023-10-25
Payer: MEDICARE

## 2023-10-25 VITALS
HEIGHT: 71 IN | WEIGHT: 226 LBS | OXYGEN SATURATION: 97 % | SYSTOLIC BLOOD PRESSURE: 126 MMHG | BODY MASS INDEX: 31.64 KG/M2 | DIASTOLIC BLOOD PRESSURE: 88 MMHG | HEART RATE: 67 BPM

## 2023-10-25 DIAGNOSIS — E78.2 MIXED HYPERLIPIDEMIA: ICD-10-CM

## 2023-10-25 DIAGNOSIS — I25.10 CORONARY ARTERY DISEASE INVOLVING NATIVE CORONARY ARTERY OF NATIVE HEART WITHOUT ANGINA PECTORIS: Primary | ICD-10-CM

## 2023-10-25 DIAGNOSIS — R53.83 FATIGUE, UNSPECIFIED TYPE: ICD-10-CM

## 2023-10-25 DIAGNOSIS — R06.02 SOB (SHORTNESS OF BREATH) ON EXERTION: ICD-10-CM

## 2023-10-25 PROCEDURE — G8484 FLU IMMUNIZE NO ADMIN: HCPCS | Performed by: INTERNAL MEDICINE

## 2023-10-25 PROCEDURE — G8427 DOCREV CUR MEDS BY ELIG CLIN: HCPCS | Performed by: INTERNAL MEDICINE

## 2023-10-25 PROCEDURE — 99204 OFFICE O/P NEW MOD 45 MIN: CPT | Performed by: INTERNAL MEDICINE

## 2023-10-25 PROCEDURE — G8417 CALC BMI ABV UP PARAM F/U: HCPCS | Performed by: INTERNAL MEDICINE

## 2023-10-25 NOTE — PATIENT INSTRUCTIONS
Your provider has ordered testing for further evaluation. An order/prescription has been included in your paper work. To schedule outpatient testing, contact Central Scheduling by calling [address] [place] [address] ([phone]). Plan:  1. Order GXT stress myoview ~ may concert to Juan Carlos Dinning if needed. ~ established coronary artery disease, shortness of breath on exertion  2. Discussion on possible left heart catheterization ~ await stress test results   3. I recommend that the patient continue their currently prescribed medications. Their drug modifiable risk factors appear to be well controlled. I will continue to address the need/dosing of medications in future visits. 4. Continue as planned with Electrophysiology appointment with   5. Follow up based on testing results, we will call you to discuss.

## 2023-10-25 NOTE — PROGRESS NOTES
[address]   Cardiovascular Evaluation    PATIENT: Basim Dooley  DATE: 10/25/2023  MRN: [id number]  [facility]: [id number]  : 1945    Primary Care Doctor/Referring provider: Florecita Mccracken MD, No admitting provider for patient encounter. Reason for evaluation/Chief complaint:   New Patient, Coronary Artery Disease, Atrial Fibrillation, Hyperlipidemia, and Shortness of Breath      Subjective:    History of present illness on initial date of evaluation:   Basim Dooley is a 69 y.o. male patient who presents as a new patient for cardiology evaluation and treatment for management of CAD. The patient has a past medical history of hypertension, hyperlipidemia, COPD, prostate cancer, coronary artery disease, and atrial fibrillation. He underwent cardiac catheterization in 2016, which demonstrated only minimal luminal irregularities and no fixed obstructive coronary artery disease. The patient presented for elective colonoscopy on 2022 and AF was identified incidentally. The patient follows with Electrophysiology for management of AF. Echo 23 EF 50-55%. A CT calcium score was ordered 10/16/23 total score of 581 ( , Cx 0, RCA 7, and .)    Today he presents with his wife Gilma Antoine. He reports he has SOB with exertion such as cutting wood. He has noticed he has to stop and rest due to fatigue. This has progressed over this year. He recently went out with leaf blower walking for 2 hours this caused him to be fatigued. Gilma Antoine denies hearing him snore at night or stop breathing. He used to ride horses does not participate in this activity anymore. Patient currently denies any weight gain, edema, palpitations, chest pain, dizziness, and syncope. He is taking medications as prescribed, tolerating well. Denies recent issues with bleeding or bruising. 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 He is retired farmer.  He reports he has

## 2023-11-08 ENCOUNTER — HOSPITAL ENCOUNTER (OUTPATIENT)
Dept: NUCLEAR MEDICINE | Age: 78
Discharge: HOME | End: 2023-11-08
Payer: MEDICARE

## 2023-11-08 ENCOUNTER — HOSPITAL ENCOUNTER (OUTPATIENT)
Dept: NON INVASIVE DIAGNOSTICS | Age: 78
Discharge: HOME | End: 2023-11-08
Payer: MEDICARE

## 2023-11-08 DIAGNOSIS — I25.10 CORONARY ARTERY DISEASE INVOLVING NATIVE CORONARY ARTERY OF NATIVE HEART WITHOUT ANGINA PECTORIS: ICD-10-CM

## 2023-11-08 DIAGNOSIS — R06.02 SOB (SHORTNESS OF BREATH) ON EXERTION: ICD-10-CM

## 2023-11-08 PROCEDURE — 78452 HT MUSCLE IMAGE SPECT MULT: CPT

## 2023-11-08 PROCEDURE — 3430000000 HC RX DIAGNOSTIC RADIOPHARMACEUTICAL: Performed by: INTERNAL MEDICINE

## 2023-11-08 PROCEDURE — 93017 CV STRESS TEST TRACING ONLY: CPT

## 2023-11-08 PROCEDURE — 6360000002 HC RX W HCPCS: Performed by: INTERNAL MEDICINE

## 2023-11-08 PROCEDURE — A9502 TC99M TETROFOSMIN: HCPCS | Performed by: INTERNAL MEDICINE

## 2023-11-08 RX ORDER — REGADENOSON 0.08 MG/ML
0.4 INJECTION, SOLUTION INTRAVENOUS
Status: COMPLETED | OUTPATIENT
Start: 2023-11-08 | End: 2023-11-08

## 2023-11-08 RX ADMIN — REGADENOSON 0.4 MG: 0.08 INJECTION, SOLUTION INTRAVENOUS at 12:50

## 2023-11-08 RX ADMIN — TETROFOSMIN 11.3 MILLICURIE: 1.38 INJECTION, POWDER, LYOPHILIZED, FOR SOLUTION INTRAVENOUS at 10:26

## 2023-11-08 RX ADMIN — TETROFOSMIN 32.5 MILLICURIE: 1.38 INJECTION, POWDER, LYOPHILIZED, FOR SOLUTION INTRAVENOUS at 12:50

## 2023-11-08 NOTE — PROGRESS NOTES
Pt ordered as a GXT/myoview, in AFIB with RVR, HR ranging from 100 to 108, attending cardiologist consulted, Dr. Tyree Solorio states to convert test to lexiscan/myoview.

## 2023-11-09 ENCOUNTER — TELEPHONE (OUTPATIENT)
Dept: CARDIOLOGY CLINIC | Age: 78
End: 2023-11-09

## 2023-11-09 DIAGNOSIS — R94.39 ABNORMAL STRESS TEST: Primary | ICD-10-CM

## 2023-11-09 NOTE — TELEPHONE ENCOUNTER
----- Message from Sony Baeza MD sent at 11/9/2023  8:34 AM EST -----  Please inform the patient that the test is abnormal.   The test shows - possible blockage    I recommend that the patient see us in office to discuss angiogram or be scheduled.

## 2023-11-09 NOTE — TELEPHONE ENCOUNTER
Called and spoke with patient and wife. Questions answered. Patient would like to proceed with [facility] as ordered.     ~ Medication instructions not given to patient:  ~ Hold eliquis 48 hours prior to procedure  ~ Morning of procedure hold Vitamin B complex, calcium-vitamin D, Glucosimine, Omega 3, multivitamin and multivitamin.  ~ May take all other prescribed medications with sip of water morning of procedure.

## 2023-11-09 NOTE — TELEPHONE ENCOUNTER
Pt's wife called to set up either angio or OV with [facility]. After thinking about what to do,wife wants pt to see [facility] before having Angio. They several questions. Next available at [facility] is 1/2/2024, [facility] is 1/12/2024. Please advise date/time. TY

## 2023-11-10 NOTE — TELEPHONE ENCOUNTER
Procedure:  [facility]  Doctor:  Dr. Baeza  Date:  11/20/23  Time:  10am  Arrival:  8:30am  Reps:  n/a  Anesthesia:  n/a      Spoke with patient's wife.  Please have patient arrive to the main entrance of [facility] ([address] [place] [id number]) and check in with the registration desk.  They will be directed to the Cath Lab.  Please call patient regarding medication instructions. Remind patient to be NPO after midnight (8 hours prior). Do not apply lotions/creams on skin the day of procedure.    Instructions sent thru email per request.     Progress Notes by Tamara Bustillos MD at 06/21/18 04:33 PM     Author:  Tamara Bustillos MD Service:  (none) Author Type:  Physician     Filed:  06/22/18 04:39 PM Encounter Date:  6/21/2018 Status:  Signed     :  Tamara Bustillos MD (Physician)              PEDIATRIC ILLNESS VISIT       6/21/2018    Roomed by: Edith Zacarias [facility] 4:33 PM      SUBJECTIVE  Accompanied by:[JE1.1T]  Mother[JE1.1M] [phone] (home)  [phone] (cell)[JE1.1T] ok to leave message[JE1.1M]     Jt is a 8 year old male who is complaining of[JE1.1T] inward feet[JE1.1M].[JE1.1T]  Per mom, his feet have turned in since he began walking.  Mom notices also that when he is barefoot, feet seems to \"fall in\". He does not trip or fall. He serrato not complain of pain.[IH1.1M]  Previous medical contacts for the problem -[JE1.1T] none[IH1.1M]    Symptoms:  Fever:[JE1.1T]     No elevation of temperature[IH1.1M]  General:[JE1.1T] No problems, irritability, fussiness, crying, or lethargy  Other:       No other significant symptoms noted[IH1.1M]    ROS  All other ROS negative unless indicated otherwise above.    Allergies:  Omnicef [cefdinir]    No current outpatient prescriptions on file.       Family history:[JE1.1T] No other family members have acute illnesses[IH1.1M]    Social history:[JE1.1T] Attends school[IH1.1M]      OBJECTIVE:   Physical exam  Pulse 88  Temp 97.5 °F (36.4 °C) (Temporal)   Resp 20  Wt 63 lb 9.6 oz (28.8 kg)    GENERAL:[JE1.1T] Normal- alert and no distress noted   CHEST: Lungs are clear to auscultation and no retractions.  CARDIO: No murmur or cardiac rhythm irregularities.  ABDOMEN: Soft, normal bowel sounds, no organomegaly, masses or tenderness.  MUS-SKEL:  --- no edema, erythema, or tenderness    A[IH1.1M]SSESSMENT/PLAN[JE1.1T]  Jt Tapia is a 8 year old male[IH1.1T] here with in toeing since 12 months  - Referred to Dr. Lanier[IH1.1M]    Medication changes:[JE1.1T] No[IH1.1M]    Immunizations  given today?[JE1.1T] No.[IH1.1M]  See Orders:  Instructed to call if the problem worsens or does not improve within the next 24 to 48 hours.    Schedule follow-up:[JE1.1T] at next well child visit[IH1.1M]    Electronically Signed by:    Tamara Bustillos MD , 6/22/2018[IH1.2T]        Revision History        User Key Date/Time User Provider Type Action    > IH1.2 06/22/18 04:39 PM Tamara Bustillos MD Physician Sign     IH1.1 06/22/18 04:36 PM Tamara Bustillos MD Physician      JE1.1 06/21/18 04:34 PM Edith Zacarias CMA Medical Assistant Sign at close encounter    M - Manual, T - Template

## 2023-11-20 ENCOUNTER — HOSPITAL ENCOUNTER (OUTPATIENT)
Dept: CARDIAC CATH/INVASIVE PROCEDURES | Age: 78
Discharge: HOME | End: 2023-11-20
Attending: INTERNAL MEDICINE | Admitting: INTERNAL MEDICINE
Payer: MEDICARE

## 2023-11-20 VITALS
HEART RATE: 79 BPM | SYSTOLIC BLOOD PRESSURE: 123 MMHG | OXYGEN SATURATION: 96 % | HEIGHT: 70 IN | DIASTOLIC BLOOD PRESSURE: 73 MMHG | BODY MASS INDEX: 32.18 KG/M2 | RESPIRATION RATE: 22 BRPM | WEIGHT: 224.8 LBS

## 2023-11-20 PROBLEM — R94.39 ABNORMAL CARDIOVASCULAR STRESS TEST: Status: ACTIVE | Noted: 2023-11-20

## 2023-11-20 LAB
ANION GAP SERPL CALCULATED.3IONS-SCNC: 11 MMOL/L (ref 3–16)
BUN SERPL-MCNC: 19 MG/DL (ref 7–20)
CALCIUM SERPL-MCNC: 9.3 MG/DL (ref 8.3–10.6)
CHLORIDE SERPL-SCNC: 102 MMOL/L (ref 99–110)
CHOLEST SERPL-MCNC: 127 MG/DL (ref 0–199)
CO2 SERPL-SCNC: 27 MMOL/L (ref 21–32)
CREAT SERPL-MCNC: 0.9 MG/DL (ref 0.8–1.3)
DEPRECATED RDW RBC AUTO: 12.6 % (ref 12.4–15.4)
EKG ATRIAL RATE: 267 BPM
EKG DIAGNOSIS: NORMAL
EKG Q-T INTERVAL: 378 MS
EKG QRS DURATION: 86 MS
EKG QTC CALCULATION (BAZETT): 449 MS
EKG R AXIS: -20 DEGREES
EKG T AXIS: 17 DEGREES
EKG VENTRICULAR RATE: 85 BPM
GFR SERPLBLD CREATININE-BSD FMLA CKD-EPI: >60 ML/MIN/{1.73_M2}
GLUCOSE SERPL-MCNC: 97 MG/DL (ref 70–99)
HCT VFR BLD AUTO: 40.5 % (ref 40.5–52.5)
HDLC SERPL-MCNC: 54 MG/DL (ref 40–60)
HGB BLD-MCNC: 14 G/DL (ref 13.5–17.5)
LDLC SERPL CALC-MCNC: 61 MG/DL
MCH RBC QN AUTO: 33.6 PG (ref 26–34)
MCHC RBC AUTO-ENTMCNC: 34.6 G/DL (ref 31–36)
MCV RBC AUTO: 97 FL (ref 80–100)
PLATELET # BLD AUTO: 200 K/UL (ref 135–450)
PMV BLD AUTO: 8.6 FL (ref 5–10.5)
POTASSIUM SERPL-SCNC: 3.8 MMOL/L (ref 3.5–5.1)
RBC # BLD AUTO: 4.18 M/UL (ref 4.2–5.9)
SODIUM SERPL-SCNC: 140 MMOL/L (ref 136–145)
TRIGL SERPL-MCNC: 58 MG/DL (ref 0–150)
VLDLC SERPL CALC-MCNC: 12 MG/DL
WBC # BLD AUTO: 4.8 K/UL (ref 4–11)

## 2023-11-20 PROCEDURE — 99152 MOD SED SAME PHYS/QHP 5/>YRS: CPT | Performed by: INTERNAL MEDICINE

## 2023-11-20 PROCEDURE — 80048 BASIC METABOLIC PNL TOTAL CA: CPT

## 2023-11-20 PROCEDURE — 6360000002 HC RX W HCPCS

## 2023-11-20 PROCEDURE — 93458 L HRT ARTERY/VENTRICLE ANGIO: CPT

## 2023-11-20 PROCEDURE — 85027 COMPLETE CBC AUTOMATED: CPT

## 2023-11-20 PROCEDURE — 2709999900 HC NON-CHARGEABLE SUPPLY: Performed by: INTERNAL MEDICINE

## 2023-11-20 PROCEDURE — 93010 ELECTROCARDIOGRAM REPORT: CPT | Performed by: INTERNAL MEDICINE

## 2023-11-20 PROCEDURE — C1894 INTRO/SHEATH, NON-LASER: HCPCS | Performed by: INTERNAL MEDICINE

## 2023-11-20 PROCEDURE — 93005 ELECTROCARDIOGRAM TRACING: CPT | Performed by: INTERNAL MEDICINE

## 2023-11-20 PROCEDURE — 6370000000 HC RX 637 (ALT 250 FOR IP)

## 2023-11-20 PROCEDURE — 93458 L HRT ARTERY/VENTRICLE ANGIO: CPT | Performed by: INTERNAL MEDICINE

## 2023-11-20 PROCEDURE — C1769 GUIDE WIRE: HCPCS | Performed by: INTERNAL MEDICINE

## 2023-11-20 PROCEDURE — 2500000003 HC RX 250 WO HCPCS

## 2023-11-20 PROCEDURE — 80061 LIPID PANEL: CPT

## 2023-11-20 RX ORDER — MIDAZOLAM HYDROCHLORIDE 1 MG/ML
INJECTION INTRAMUSCULAR; INTRAVENOUS
Status: COMPLETED | OUTPATIENT
Start: 2023-11-20 | End: 2023-11-20

## 2023-11-20 RX ORDER — SODIUM CHLORIDE 9 MG/ML
INJECTION, SOLUTION INTRAVENOUS PRN
Status: DISCONTINUED | OUTPATIENT
Start: 2023-11-20 | End: 2023-11-20 | Stop reason: HOSPADM

## 2023-11-20 RX ORDER — SODIUM CHLORIDE 0.9 % (FLUSH) 0.9 %
5-40 SYRINGE (ML) INJECTION PRN
Status: DISCONTINUED | OUTPATIENT
Start: 2023-11-20 | End: 2023-11-20 | Stop reason: HOSPADM

## 2023-11-20 RX ORDER — ASPIRIN 325 MG
325 TABLET ORAL ONCE
Status: COMPLETED | OUTPATIENT
Start: 2023-11-20 | End: 2023-11-20

## 2023-11-20 RX ORDER — SODIUM CHLORIDE 0.9 % (FLUSH) 0.9 %
5-40 SYRINGE (ML) INJECTION EVERY 12 HOURS SCHEDULED
Status: DISCONTINUED | OUTPATIENT
Start: 2023-11-20 | End: 2023-11-20 | Stop reason: HOSPADM

## 2023-11-20 RX ORDER — HYDRALAZINE HYDROCHLORIDE 20 MG/ML
10 INJECTION INTRAMUSCULAR; INTRAVENOUS EVERY 10 MIN PRN
Status: DISCONTINUED | OUTPATIENT
Start: 2023-11-20 | End: 2023-11-20 | Stop reason: HOSPADM

## 2023-11-20 RX ORDER — FENTANYL CITRATE 50 UG/ML
INJECTION, SOLUTION INTRAMUSCULAR; INTRAVENOUS
Status: COMPLETED | OUTPATIENT
Start: 2023-11-20 | End: 2023-11-20

## 2023-11-20 RX ORDER — SODIUM CHLORIDE 9 MG/ML
INJECTION, SOLUTION INTRAVENOUS CONTINUOUS
Status: DISCONTINUED | OUTPATIENT
Start: 2023-11-20 | End: 2023-11-20 | Stop reason: HOSPADM

## 2023-11-20 RX ORDER — ONDANSETRON 2 MG/ML
4 INJECTION INTRAMUSCULAR; INTRAVENOUS EVERY 6 HOURS PRN
Status: DISCONTINUED | OUTPATIENT
Start: 2023-11-20 | End: 2023-11-20 | Stop reason: HOSPADM

## 2023-11-20 RX ORDER — LORAZEPAM 0.5 MG/1
0.5 TABLET ORAL
Status: DISCONTINUED | OUTPATIENT
Start: 2023-11-20 | End: 2023-11-20 | Stop reason: HOSPADM

## 2023-11-20 RX ADMIN — FENTANYL CITRATE 50 MCG: 50 INJECTION, SOLUTION INTRAMUSCULAR; INTRAVENOUS at 11:57

## 2023-11-20 RX ADMIN — Medication 325 MG: at 09:20

## 2023-11-20 RX ADMIN — MIDAZOLAM HYDROCHLORIDE 1 MG: 1 INJECTION INTRAMUSCULAR; INTRAVENOUS at 11:57

## 2023-11-20 NOTE — PROCEDURES
[facility]       Cardiac Catheterization Lab Report    PATIENT: Jarrod Meadows  DATE: 2023  MRN: [id number]  CSN: [id number]  : 1945      Performing Physician: Syl Phelps MD, [address] [place] [address]  Primary Care Physician: Vishal Watkins MD  Admitting Provider: Margaret Dockery MD    Procedures Performed:   1. Left heart catheterization  2. Selective left and right coronary angiogram  3. Left ventriculography     Procedure Findings:  1. Equivocal LM stenosis  2. Reduced left ventricular function with EF estimated at 45%  3. Normal left heart hemodynamics    Indications:   Patient Active Problem List   Diagnosis    COPD (chronic obstructive pulmonary disease) (HCC)    DDD (degenerative disc disease), lumbar    Prostate cancer ([address])    Mixed hyperlipidemia    CAD (coronary artery disease)    Adult celiac disease    Paget's bone disease    New onset a-fib ([address])    SOB (shortness of breath) on exertion    Fatigue    Abnormal cardiovascular stress test       Details:   Jarrod Meadows was brought to the cardiac catheterization lab in a fasting state after informed consent was obtained. If the patient was able to provide written consent, it was obtained. The patient's vitals were monitored through out the procedure. The patient was sedated using the appropriate levels of sedation and ASA guidelines. The appropriate access site area was prepped and drapped in a sterile fashion. The area was anesthetized with 2% lidocaine. Using the modified Seldinger technique, an arterial sheath was introduced into the arterial access site using a single anterior wall puncture. The sheath was flushed and prepped in usual fashion. Catheters used during the procedure included 5 Croatian TIG 4.0 catheter. The catheters were advanced and removed over a .035\" wire, into the appropriate positions. Multiple angiographic views were obtained. An LV gram was obtained. Findings:    1.  Left main coronary

## 2023-11-20 NOTE — H&P
COPD (chronic obstructive pulmonary disease) (HCC)    DDD (degenerative disc disease), lumbar    Prostate cancer (HCC)    Mixed hyperlipidemia    CAD (coronary artery disease)    Adult celiac disease    Paget's bone disease    New onset a-fib (HCC)    SOB (shortness of breath) on exertion    Fatigue    Abnormal cardiovascular stress test         Cardiac Testing: I have reviewed the findings below. EKG:  ECHO:   STRESS TEST:  CATH:  BYPASS:  VASCULAR:    Past Medical History:   has a past medical history of Asthma, CAD (coronary artery disease), Celiac sprue, COPD (chronic obstructive pulmonary disease) ([address]), DDD (degenerative disc disease), lumbar, Fatty liver, HTN (hypertension), Hyperlipidemia, Paget's disease, and Prostate cancer ([address] Surgical History:   has a past surgical history that includes Prostatectomy; Upper gastrointestinal endoscopy (11/19/2012); Colonoscopy (11/2017); hernia repair; Colonoscopy; and Cardioversion (2023). Social History:   reports that he quit smoking about 53 years ago. His smoking use included cigars and cigarettes. He started smoking about 59 years ago. He has a 0.40 pack-year smoking history. He has never used smokeless tobacco. He reports that he does not drink alcohol and does not use drugs. Family History:  No evidence for sudden cardiac death or premature CAD    Medications:  Reviewed and are listed in nursing record. and/or listed below  Outpatient Medications:  Prior to Admission medications    Medication Sig Start Date End Date Taking?  Authorizing Provider   omeprazole (PRILOSEC) 20 MG delayed release capsule Take 1 capsule by mouth in the morning 10/2/23 12/31/23  DONTA Rodriguez - CNP   atorvastatin (LIPITOR) 20 MG tablet Take 1 tablet by mouth once daily 10/2/23   DONTA Rodriguez - CNP   BREO ELLIPTA 200-25 MCG/ACT AEPB inhaler Inhale 1 puff by mouth once daily 9/5/23   Bernadette Montano MD   ELIQUIS 5 MG TABS tablet Take 1 tablet by mouth

## 2023-11-20 NOTE — DISCHARGE INSTRUCTIONS
Cath Labs at  [facility]   Discharge Instructions        11/20/2023  Latasha Guzman   Date of Birth 1945       Activity:  No driving for 24 hours. In 24 hours you may remove dressing and shower, wash site gently with soap and water and leave open to air  Avoid submerging your arm in sitting water for 5 days. Do not use your {left/right:[id number]} hand for 24 hours, then  No lifting more than 5 pounds for 5 days. No lotions, powders, or ointments near site for 5 days. No work/school for 5 days unless instructed otherwise by your cardiologist.    Diet:   Resume previous diet, if a cardiac diet is specified you will receive a handout with  general guidelines. Drink extra non-alcoholic/decaffienated fluids for first 24 hours after your procedure. Arm Management:  If bleeding occurs from the site or a hematoma (lump) begins to increase in size, apply pressure directly over the site, call 911 to return to the hospital.    Special Instructions:  Report any coolness or numbness in the arm  Report any chills, fever, itching, red bumps or rash   Report any of the following to the MD: drainage from the site, redness and/or swelling at the site, increased tenderness at the site   If you are currently taking Metformin or Metformin combination medications for Diabetes, hold your dose for 48 hours after your procedure. Consult your Cardiologist before taking any NSAIDS, vitamin supplements, estrogen, or estrogen plus progestin. Do not stop taking Plavix, Brilinta or Effient, without first consulting your cardiologist.    Sedation Discharge Instructions: For the next 24 hours do not drive a car, operate machinery, power tools or kitchen appliances. Do not drink alcohol; including beer or wine. Do not make any important decisions or sign any important papers.   For the next 24 hours you can expect drowsiness, light-headed or dizziness, nausea/ vomiting, inability to concentrate, fatigue and desire to

## 2023-11-20 NOTE — POST SEDATION
Patient:  Jessy Maldonado   :   1945    A pre-sedation re-evaluation was performed immediately at the end of the procedure. Procedure:  Cardiac cath  Medications: Procedural sedation with minimal conscious sedation  Complications: None  Estimated Blood Loss: none  Specimens: Were not obtained        [facility] [place] Medication and Procedural Reconciliation:  I agree that the documented medications and procedures performed are true. The medications were given under my order. The procedures were performed under my direct supervision.

## 2023-11-20 NOTE — PRE SEDATION
Brief Pre-Op Note/Sedation Assessment      Hayes Briceno [facility]  1945  [id number]  9:22 AM    Planned Procedure: Cardiac Catheterization Procedure  Post Procedure Plan: Return to same level of care  Consent: I have discussed with the patient and/or the patient representative the indication, alternatives, and the possible risks and/or complications of the planned procedure and the anesthesia methods. The patient and/or patient representative appear to understand and agree to proceed. Chief Complaint:   Chest Pain/Pressure      Indications for Cath Procedure:  Presentation:  New Onset Angina <= 2 months, Cardiac Arrythmia, and Cardiomyopathy  2. Anginal Classification within 2 weeks:  CCS III - Symptoms with everyday living activities, i.e., moderate limitation  3. Angina Symptoms Assessment:  Atypical Chest Pain  4. Heart Failure Class within last 2 weeks:  Yes:  Heart Failure Type: Systolic Severity:  Class III - Symptoms of HF on less-than-ordinary exertion  5. Cardiovascular Instability:  No    Prior Ischemic Workup/Eval:  Pre-Procedural Medications: Yes: Aspirin, Non-STATIN Cholesterol (Any), and STATIN  2. Stress Test Completed? Yes:  Stress or Imaging Studies Performed (within ANY time period):   Type:  Stress Nuclear  Results:  Positive:  Myocardial Perfusion Defects (Nuclear) Extent of Ischemia:  High Risk (>3% annual death or MI)    Does Patient need surgery? Cath Valve Surgery:  No    Pre-Procedure Medical History:  Vital Signs:  BP (!) 148/99   SpO2 100%     Allergies:     Allergies   Allergen Reactions    Bee Venom     Pcn [Penicillins] Swelling    Wheat Extract Other (See Comments)     Celiac disease     Medications:    Current Facility-Administered Medications   Medication Dose Route Frequency Provider Last Rate Last Admin    sodium chloride flush 0.9 % injection 5-40 mL  5-40 mL IntraVENous 2 times per day Alberto Eng MD        sodium chloride flush 0.9 % injection 5-40 mL  5-40

## 2023-12-30 DIAGNOSIS — E78.2 MIXED HYPERLIPIDEMIA: ICD-10-CM

## 2023-12-30 DIAGNOSIS — K21.9 GASTROESOPHAGEAL REFLUX DISEASE WITHOUT ESOPHAGITIS: ICD-10-CM

## 2024-01-02 ENCOUNTER — TELEPHONE (OUTPATIENT)
Dept: CARDIOLOGY CLINIC | Age: 79
End: 2024-01-02

## 2024-01-02 RX ORDER — OMEPRAZOLE 20 MG/1
20 CAPSULE, DELAYED RELEASE ORAL DAILY
Qty: 90 CAPSULE | Refills: 0 | Status: SHIPPED | OUTPATIENT
Start: 2024-01-02 | End: 2024-04-01

## 2024-01-02 RX ORDER — ATORVASTATIN CALCIUM 20 MG/1
TABLET, FILM COATED ORAL
Qty: 90 TABLET | Refills: 0 | Status: SHIPPED | OUTPATIENT
Start: 2024-01-02

## 2024-01-02 NOTE — TELEPHONE ENCOUNTER
Per LOY please reschedule OV 1/9/2024 to March 2024 so pt can be evaluated after echo is completed on 2/20/2024. Thanks

## 2024-01-03 LAB
BASOPHILS ABSOLUTE: NORMAL
BASOPHILS RELATIVE PERCENT: NORMAL
EOSINOPHILS ABSOLUTE: NORMAL
EOSINOPHILS RELATIVE PERCENT: NORMAL
FERRITIN: 95.7 NG/ML (ref 18–300)
HCT VFR BLD CALC: 41.2 % (ref 41–53)
HEMOGLOBIN: 14.2 G/DL (ref 13.5–17.5)
IRON: 104
LYMPHOCYTES ABSOLUTE: NORMAL
LYMPHOCYTES RELATIVE PERCENT: NORMAL
MCH RBC QN AUTO: 33.5 PG
MCHC RBC AUTO-ENTMCNC: 34.5 G/DL
MCV RBC AUTO: 97.2 FL
MONOCYTES ABSOLUTE: NORMAL
MONOCYTES RELATIVE PERCENT: NORMAL
NEUTROPHILS ABSOLUTE: NORMAL
NEUTROPHILS RELATIVE PERCENT: NORMAL
PLATELET # BLD: 208 K/ΜL
PMV BLD AUTO: NORMAL FL
RBC # BLD: 4.24 10^6/ΜL
TOTAL IRON BINDING CAPACITY: 336
WBC # BLD: 5 10^3/ML

## 2024-01-09 ENCOUNTER — TELEPHONE (OUTPATIENT)
Dept: CARDIOLOGY CLINIC | Age: 79
End: 2024-01-09

## 2024-01-09 NOTE — TELEPHONE ENCOUNTER
Please call [facility] cardiopulmonary department and see if they have the final interpretation of this PFT study. We received the raw data but needs to be interpreted by pulmonary.       Let patient know his iron labs are normal.     Thanks

## 2024-01-23 ENCOUNTER — OFFICE VISIT (OUTPATIENT)
Dept: CARDIOLOGY CLINIC | Age: 79
End: 2024-01-23
Payer: MEDICARE

## 2024-01-23 VITALS
DIASTOLIC BLOOD PRESSURE: 74 MMHG | HEART RATE: 80 BPM | HEIGHT: 70 IN | WEIGHT: 230 LBS | BODY MASS INDEX: 32.93 KG/M2 | SYSTOLIC BLOOD PRESSURE: 128 MMHG | OXYGEN SATURATION: 97 %

## 2024-01-23 DIAGNOSIS — I48.91 NEW ONSET A-FIB (HCC): Primary | ICD-10-CM

## 2024-01-23 PROCEDURE — 93000 ELECTROCARDIOGRAM COMPLETE: CPT | Performed by: INTERNAL MEDICINE

## 2024-01-23 PROCEDURE — G8417 CALC BMI ABV UP PARAM F/U: HCPCS | Performed by: INTERNAL MEDICINE

## 2024-01-23 PROCEDURE — 1123F ACP DISCUSS/DSCN MKR DOCD: CPT | Performed by: INTERNAL MEDICINE

## 2024-01-23 PROCEDURE — G8427 DOCREV CUR MEDS BY ELIG CLIN: HCPCS | Performed by: INTERNAL MEDICINE

## 2024-01-23 PROCEDURE — 99204 OFFICE O/P NEW MOD 45 MIN: CPT | Performed by: INTERNAL MEDICINE

## 2024-01-23 PROCEDURE — 1036F TOBACCO NON-USER: CPT | Performed by: INTERNAL MEDICINE

## 2024-01-23 PROCEDURE — G8484 FLU IMMUNIZE NO ADMIN: HCPCS | Performed by: INTERNAL MEDICINE

## 2024-01-23 NOTE — PROGRESS NOTES
[facility]   Electrophysiology Consultation     Date: 1/23/2024  Reason for Consultation: Discuss possible RFCA of AF.   Consult Requesting Provider: Daya Perkins CNP.     CC: \"I get SOB sometimes\"    HPI: Lenin Carr is a 78 y.o. male history of hypertension, hyperlipidemia, COPD, prostate cancer, nonobstructive coronary disease ([facility] 3/2016) cath again now 11/2023 with equivocal left main disease, nonischemic cardiomyopathy with mildly reduced LVEF 45%, cardioversion in April 2023.    Patient presents today for evaluation and management of atrial fibrillation. Following his cardioversion in April states he went right back into atrial fibrillation though continued the paroxysmal and has been back in atrial fibrillation sometime in October.  States he has a history of bradycardia with heart rates as low as 30s to 40s at rest.  He has an echo scheduled in February 2024.  He complains of dyspnea on exertion as well as fatigue though he is not sure if this is related to his COPD, had not previously had adequate time in sinus rhythm to assess for symptomatic improvement.  Denies lower extremity swelling, PND/orthopnea.    Review of System:  [x] Full ROS obtained and negative except as mentioned in HPI or below      Prior to Admission medications    Medication Sig Start Date End Date Taking? Authorizing Provider   atorvastatin (LIPITOR) 20 MG tablet Take 1 tablet by mouth once daily 1/2/24  Yes Donny Amato APRN - CNP   omeprazole (PRILOSEC) 20 MG delayed release capsule Take 1 capsule by mouth in the morning 1/2/24 4/1/24 Yes Donny Amato APRN - CNP   diphenhydrAMINE-APAP, sleep, (TYLENOL PM EXTRA STRENGTH)  MG tablet Take 1 tablet by mouth nightly as needed   Yes Provider, MD ILDEFONSO Brink 200-25 MCG/ACT AEPB inhaler Inhale 1 puff by mouth once daily 9/5/23  Yes Clement Lebron MD   ELIQUIS 5 MG TABS tablet Take 1 tablet by mouth twice daily 6/5/23 1/23/24 Yes Bernardino

## 2024-01-23 NOTE — PATIENT INSTRUCTIONS
Will await echo results before deciding on antiarrhythmic medication, follow up with HF clinic as planned 3/2024, follow up with me in 3 months.

## 2024-02-20 ENCOUNTER — HOSPITAL ENCOUNTER (OUTPATIENT)
Dept: NON INVASIVE DIAGNOSTICS | Age: 79
Discharge: HOME | End: 2024-02-20
Payer: MEDICARE

## 2024-02-20 ENCOUNTER — TELEPHONE (OUTPATIENT)
Dept: CARDIOLOGY CLINIC | Age: 79
End: 2024-02-20

## 2024-02-20 DIAGNOSIS — R06.02 SHORTNESS OF BREATH: ICD-10-CM

## 2024-02-20 DIAGNOSIS — I48.0 PAROXYSMAL ATRIAL FIBRILLATION (HCC): ICD-10-CM

## 2024-02-20 DIAGNOSIS — R79.9 ABNORMAL BLOOD CELL COUNT: ICD-10-CM

## 2024-02-20 LAB
LEFT VENTRICULAR EJECTION FRACTION MODE: NORMAL
LV EF: 55 %

## 2024-02-20 PROCEDURE — 93306 TTE W/DOPPLER COMPLETE: CPT

## 2024-02-20 NOTE — TELEPHONE ENCOUNTER
----- Message from DONTA Chowdary CNP sent at 2/20/2024  4:12 PM EST -----  Please call patient with echocardiogram results.  Echo shows heart function is normal. Mild thickening of the heart muscle but wouldn't cause more shortness of breath. Normal pulmonary pressures.   Keep follow up as planned   Continue current regimen.

## 2024-02-20 NOTE — RESULT ENCOUNTER NOTE
Please call patient with echocardiogram results.  Echo shows heart function is normal. Mild thickening of the heart muscle but wouldn't cause more shortness of breath. Normal pulmonary pressures.   Keep follow up as planned   Continue current regimen.

## 2024-02-21 NOTE — TELEPHONE ENCOUNTER
CMV- please review echo result and advise on antiarrhythmic medication and repeat cardioversion      CMV visit 1/23/2024    Assessment/Plan:     Persistent atrial fibrillation  - Currently in atrial fibrillation today, previous EKGs in October and November 2023 with atrial fibrillation, last in sinus rhythm May 2023  - Diagnosed in 12/2022 when presenting pre-operatively for colonoscopy. ECG at that time demonstrated AF 72 BPM.   - [id number] Score of 3 (HTN and age), continue Eliquis  - Cardioversion 4/2023 with early recurrence  - EF of 50-55% per echo 1/2023. LHC 11/2023 demonstrated slightly reduced EF of 45%.   - Monitor 12/2022 demonstrated persistent AF with an average HR of 80 BPM  - Recent TSH 1.1 8/2023   - We discussed the management of atrial fibrillation including a rate versus rhythm approach, rhythm control with antiarrhythmic medication and/or ablation, unclear how much of his symptoms are related to atrial fibrillation that appears to be rate controlled at least on multiple EKGs, that said I do think it is worth attempting restoration of sinus rhythm to see if he has symptomatic improvement before proceeding with something like ablation, he may have limited antiarrhythmic options, recommended a repeat echo which she has here in the next month, following this we will decide on whether to proceed with antiarrhythmic medication and repeat cardioversion

## 2024-02-22 NOTE — TELEPHONE ENCOUNTER
LVM for patient to relay CMV message. Can be added on to CMV clinic 2/29 at 3:30 PM to further discuss.

## 2024-02-22 NOTE — TELEPHONE ENCOUNTER
Tyrel Haji MD  You16 hours ago (9:36 PM)       Happy to have conversation with him in clinic about AF, can we schedule? ECHO looks good, preserved LVEF. Options include rate control AF or starting AAD which we would have to discuss amiodarone or class III agent which would require 3 day hospital stay.    Thanks    Raissa

## 2024-02-23 ENCOUNTER — OFFICE VISIT (OUTPATIENT)
Dept: FAMILY MEDICINE CLINIC | Age: 79
End: 2024-02-23

## 2024-02-23 VITALS
BODY MASS INDEX: 32.47 KG/M2 | HEIGHT: 70 IN | DIASTOLIC BLOOD PRESSURE: 62 MMHG | SYSTOLIC BLOOD PRESSURE: 106 MMHG | OXYGEN SATURATION: 97 % | HEART RATE: 55 BPM | WEIGHT: 226.8 LBS

## 2024-02-23 DIAGNOSIS — J06.9 VIRAL URI: Primary | ICD-10-CM

## 2024-02-23 DIAGNOSIS — M79.652 LEFT THIGH PAIN: ICD-10-CM

## 2024-02-23 RX ORDER — FLUTICASONE PROPIONATE 50 MCG
2 SPRAY, SUSPENSION (ML) NASAL DAILY
Qty: 16 G | Refills: 5 | Status: SHIPPED | OUTPATIENT
Start: 2024-02-23

## 2024-02-23 ASSESSMENT — PATIENT HEALTH QUESTIONNAIRE - PHQ9
2. FEELING DOWN, DEPRESSED OR HOPELESS: NOT AT ALL
SUM OF ALL RESPONSES TO PHQ9 QUESTIONS 1 & 2: 0
1. LITTLE INTEREST OR PLEASURE IN DOING THINGS: 0
SUM OF ALL RESPONSES TO PHQ QUESTIONS 1-9: 0
1. LITTLE INTEREST OR PLEASURE IN DOING THINGS: NOT AT ALL
2. FEELING DOWN, DEPRESSED OR HOPELESS: 0
SUM OF ALL RESPONSES TO PHQ QUESTIONS 1-9: 0
SUM OF ALL RESPONSES TO PHQ9 QUESTIONS 1 & 2: 0
SUM OF ALL RESPONSES TO PHQ QUESTIONS 1-9: 0
SUM OF ALL RESPONSES TO PHQ QUESTIONS 1-9: 0

## 2024-02-23 ASSESSMENT — ENCOUNTER SYMPTOMS
RHINORRHEA: 1
COUGH: 1

## 2024-02-23 NOTE — PROGRESS NOTES
Chief Complaint   Patient presents with    Annual Exam    Groin Pain     Radiating down leg    Sinus Problem     Cough and sneezing x2 weeks       HPI:  Lenin Carr is a 79 y.o. (: 1945) here today for pain, sinus problems, and routine.  URI   This is a new problem. The current episode started 1 to 4 weeks ago (2 weeks). The problem has been waxing and waning. There has been no fever. Associated symptoms include coughing, rhinorrhea and sneezing. Associated symptoms comments: Cough and sneeze noted.  Mild sob, but not necessarily new.  Sig runny nose, congestion.  . Treatments tried: echinecia, nyquil, dayquil.     Left leg.  Pinned left leg b/t walk behind mower.  Pain w/ raising leg.  Some pain post to left knee improved.  Initially bruising noted.  No swelling noted.  Pain w/ lifting leg.  Some pain w/ palpation as well.      Has been seeing cardio re hx of afib.  Seen by new EP specialist recently.  Prior cardiac cath w/ lower EF.  Echo done recently          Patient's medications, allergies, past medical, surgical, social and family histories were reviewed and updated as appropriate.    ROS:  Review of Systems   Constitutional:  Negative for fever.   HENT:  Positive for rhinorrhea and sneezing.    Respiratory:  Positive for cough.            LDL Calculated (mg/dL)   Date Value   2023 61     LDL Direct (MG/DL)   Date Value   2018 61.8       Past Medical History:   Diagnosis Date    Asthma     childhood    CAD (coronary artery disease)     Celiac sprue     COPD (chronic obstructive pulmonary disease) (HCC)     DDD (degenerative disc disease), lumbar     Fatty liver     HTN (hypertension)     Hyperlipidemia     Paget's disease     Prostate cancer (HCC) 2009       Family History   Problem Relation Age of Onset    Other Mother         macular degeneration, DVT     Colon Cancer Father     Other Brother         celiac    High Blood Pressure Brother     Diabetes Brother     Heart Attack

## 2024-02-27 NOTE — PROGRESS NOTES
[facility]  Advanced CHF/Pulmonary Hypertension   Cardiac Evaluation      Lenin Carr  YOB: 1945    Date of Visit:  3/5/24      Chief Complaint   Patient presents with    New Patient    Fatigue        History of Present Illness:  Lenin Carr is a 79 y.o. male who presents from referral from Dr. Baeza for consultation and management of heart failure. He has a history of HLD, PAF s/p DCCV 4/2023, COPD, SOB, CAD.     In 2016 he had a LHC that showed minimal luminal irregularities.    In 1/2023 echo showed LVEF 50-55%.     In 11/2023 stress test was abnormal. On 11/20/2023 he underwent LHC that showed equivocal LM stenosis, and reduced LV EF of 45%.     At his last visit with [facility] 12/22/23 an echo and PFT test was ordered. PFT test 1/2024 showed normal response. Echo 2/20/2024 EF 55%,     Today, 3/5/2024, He is here with his wife who is a retired nurse. 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 He says he tries to pace himself when working which helps his SOB. He follows with Dr. Haji for afib. He says he is planning to schedule for a cardioversion. Patient is taking all cardiac medications as prescribed and tolerates them well.  Patient denies current edema, chest pain, palpitations, dizziness or syncope.       NYHA:2-3      Allergies   Allergen Reactions    Bee Venom     Pcn [Penicillins] Swelling    Wheat Extract Other (See Comments)     Celiac disease     Current Outpatient Medications   Medication Sig Dispense Refill    amiodarone (CORDARONE) 200 MG tablet Take 2 tablets by mouth 2 times daily for 7 days, THEN 1 tablet daily. 58 tablet 0    Ascorbic Acid (VITAMIN C PO) Take by mouth      ZINC CITRATE PO Take by mouth      fluticasone (FLONASE) 50 MCG/ACT nasal spray 2 sprays by Each Nostril route daily 16 g 5    atorvastatin (LIPITOR) 20 MG tablet Take 1 tablet by mouth once daily 90 tablet 0    omeprazole (PRILOSEC) 20 MG delayed release capsule Take 1

## 2024-02-28 NOTE — PROGRESS NOTES
[facility]   Electrophysiology Consultation     Date: 2/29/2024  Reason for Consultation: Atrial fibrillation management   Consult Requesting Provider: Daya Perkins CNP.     CC: \"Shortness of breath\"    HPI: Lenin Carr is a 79 y.o. male history of hypertension, hyperlipidemia, COPD, sinus bradycardia, prostate cancer, equivocal left main disease diagnosed on a heart catheterization from November 2023, nonischemic cardiomyopathy with mildly reduced LVEF of 45%, paroxysmal atrial fibrillation, cardioversion in April 2023.  Was initially seen and evaluated January 2024 for the management of atrial fibrillation. Complaints of dyspnea and fatigue, echo 2/20 with preserved LVEF of 55%.    Patient presents today as follow-up for evaluation and management of atrial fibrillation.  Continues to experience fatigue and shortness of breath.  Denies palpitations, racing heart, chest pain.  Denies dizziness or lightheadedness.  Compliant with anticoagulation.    Review of System:  [x] Full ROS obtained and negative except as mentioned in HPI or below    Prior to Admission medications    Medication Sig Start Date End Date Taking? Authorizing Provider   Ascorbic Acid (VITAMIN C PO) Take by mouth   Yes Keena Brown MD   ZINC CITRATE PO Take by mouth   Yes Keena Brown MD   fluticasone (FLONASE) 50 MCG/ACT nasal spray 2 sprays by Each Nostril route daily 2/23/24  Yes Clement Lebron MD   atorvastatin (LIPITOR) 20 MG tablet Take 1 tablet by mouth once daily 1/2/24  Yes Donny Amato APRN - CNP   omeprazole (PRILOSEC) 20 MG delayed release capsule Take 1 capsule by mouth in the morning 1/2/24 4/1/24 Yes Donny Amato APRN - CNP   diphenhydrAMINE-APAP, sleep, (TYLENOL PM EXTRA STRENGTH)  MG tablet Take 1 tablet by mouth nightly as needed   Yes Keena Brown MD   BREO ELLIPTA 200-25 MCG/ACT AEPB inhaler Inhale 1 puff by mouth once daily 9/5/23  Yes Clement Lebron MD ELIQUIS

## 2024-02-29 ENCOUNTER — OFFICE VISIT (OUTPATIENT)
Dept: CARDIOLOGY CLINIC | Age: 79
End: 2024-02-29
Payer: MEDICARE

## 2024-02-29 ENCOUNTER — TELEPHONE (OUTPATIENT)
Dept: CARDIOLOGY CLINIC | Age: 79
End: 2024-02-29

## 2024-02-29 VITALS
OXYGEN SATURATION: 98 % | DIASTOLIC BLOOD PRESSURE: 78 MMHG | SYSTOLIC BLOOD PRESSURE: 124 MMHG | BODY MASS INDEX: 32.56 KG/M2 | HEIGHT: 70 IN | HEART RATE: 76 BPM | WEIGHT: 227.4 LBS

## 2024-02-29 DIAGNOSIS — I48.0 PAROXYSMAL ATRIAL FIBRILLATION (HCC): Primary | ICD-10-CM

## 2024-02-29 PROCEDURE — G8427 DOCREV CUR MEDS BY ELIG CLIN: HCPCS | Performed by: INTERNAL MEDICINE

## 2024-02-29 PROCEDURE — 1036F TOBACCO NON-USER: CPT | Performed by: INTERNAL MEDICINE

## 2024-02-29 PROCEDURE — 99214 OFFICE O/P EST MOD 30 MIN: CPT | Performed by: INTERNAL MEDICINE

## 2024-02-29 PROCEDURE — 1123F ACP DISCUSS/DSCN MKR DOCD: CPT | Performed by: INTERNAL MEDICINE

## 2024-02-29 PROCEDURE — G8484 FLU IMMUNIZE NO ADMIN: HCPCS | Performed by: INTERNAL MEDICINE

## 2024-02-29 PROCEDURE — G8417 CALC BMI ABV UP PARAM F/U: HCPCS | Performed by: INTERNAL MEDICINE

## 2024-02-29 RX ORDER — AMIODARONE HYDROCHLORIDE 200 MG/1
TABLET ORAL
Qty: 58 TABLET | Refills: 0 | Status: ON HOLD | OUTPATIENT
Start: 2024-02-29 | End: 2024-03-11

## 2024-02-29 NOTE — PATIENT INSTRUCTIONS
Discussed atrial fibrillation treatment options  Start Amiodarone 400mg twice a day for 1 week, then decrease to 200mg once a day  Plan for cardioversion week of 3/11/2024  -we will call you to get this scheduled  -do not miss any doses of Eliquis  Follow up with me after procedure

## 2024-02-29 NOTE — TELEPHONE ENCOUNTER
[facility]  EP Procedure Sheet    02/29/24  Lenin Carr  1945  [id number]  EP Procedures  [] Pacemaker implant (single/dual) [] EP Study   [] ICD implant (single/dual) [] Atrial flutter ablation (ALICE Y/N)   [] Biv implant ICD [] Tilt Table   [] Biv implant PPM [] Atrial fibrillation ablation (ALICE Yes)   [] Generator Change (PPM/ICD/BiV) [] SVT ablation   [] Lead revision (RV/LA/RA) (<1 month) [] PVC ablation     [] Lead extraction +/- upgrade (BiV/PPM/ICD) [] VT Ischemic/ non-ischemic   [] Loop implant/ removal [] VT RVOT   [x] Cardioversion [] VT Left sided   [] ALICE [] AVN ablation   Equipment  [] Medtronic  [] BRANDON Mapping System    [] Precision [] Ensite X   [] [employer] S[employer]Land[employer] [] Carto Mapping System   [] [employer] [] CryoAblation   [] Biotronik [] Laser Lead Extraction   EP Procedures Scheduling Request  # hours Requested  [x]1 []2 []2-4 [] 4-6 Scheduled  Date:   Specific Day  Completed    Anesthesia []yes [x]no F/u Date:   CT surgery backup []yes []no Location []FF[]AND   Overnight stay   []KW[]Miguel Pierce MD []RMM []MXA []MW  []UL [x]CMV  []WW [] RWH   []KA [] JMB [] AJK  First vs repeat   []1st [] 2nd [] 3rd   Pre-Procedure Labs / Imaging  [] PT/INR [] Type & cross   [x] CBC [] Units PRBC   [x] BMP/Mg [] Units FFP   [] Venogram [] Cardiac CTA for Pulmonary vein mapping     RN INITIALS: EJW    Patient Instructions  Dx:atrial fibrillation  ICD-10 code:   Medication Instructions: Hold []Xarelto []Eliquis []Coumadin []pradaxa for   Do not eat or drink after midnight the night prior to procedure [x] Yes [] No    TO BE SCHEDULED ON 3/11/2024 PER CMV      MEDS TO HOLD:  - vitamin C, vitamin B complex, calcium/vitamin D, fiber, glucosamine, omega 3-6-9, multivitamin and zinc the morning of the procedure

## 2024-03-05 ENCOUNTER — TELEPHONE (OUTPATIENT)
Dept: CARDIOLOGY CLINIC | Age: 79
End: 2024-03-05

## 2024-03-05 ENCOUNTER — OFFICE VISIT (OUTPATIENT)
Dept: CARDIOLOGY CLINIC | Age: 79
End: 2024-03-05
Payer: MEDICARE

## 2024-03-05 VITALS
DIASTOLIC BLOOD PRESSURE: 72 MMHG | HEIGHT: 70 IN | SYSTOLIC BLOOD PRESSURE: 110 MMHG | OXYGEN SATURATION: 98 % | BODY MASS INDEX: 32.78 KG/M2 | HEART RATE: 65 BPM | WEIGHT: 229 LBS

## 2024-03-05 DIAGNOSIS — I25.10 CORONARY ARTERY DISEASE INVOLVING NATIVE CORONARY ARTERY OF NATIVE HEART WITHOUT ANGINA PECTORIS: ICD-10-CM

## 2024-03-05 DIAGNOSIS — I48.91 NEW ONSET A-FIB (HCC): ICD-10-CM

## 2024-03-05 DIAGNOSIS — I50.22 SYSTOLIC CHF, CHRONIC (HCC): Primary | ICD-10-CM

## 2024-03-05 DIAGNOSIS — R53.83 FATIGUE, UNSPECIFIED TYPE: ICD-10-CM

## 2024-03-05 DIAGNOSIS — R06.02 SOB (SHORTNESS OF BREATH) ON EXERTION: ICD-10-CM

## 2024-03-05 DIAGNOSIS — E78.2 MIXED HYPERLIPIDEMIA: ICD-10-CM

## 2024-03-05 PROCEDURE — G8484 FLU IMMUNIZE NO ADMIN: HCPCS | Performed by: INTERNAL MEDICINE

## 2024-03-05 PROCEDURE — G8427 DOCREV CUR MEDS BY ELIG CLIN: HCPCS | Performed by: INTERNAL MEDICINE

## 2024-03-05 PROCEDURE — G8417 CALC BMI ABV UP PARAM F/U: HCPCS | Performed by: INTERNAL MEDICINE

## 2024-03-05 PROCEDURE — 99205 OFFICE O/P NEW HI 60 MIN: CPT | Performed by: INTERNAL MEDICINE

## 2024-03-05 RX ORDER — VALSARTAN 40 MG/1
40 TABLET ORAL NIGHTLY
Qty: 90 TABLET | Refills: 3 | Status: SHIPPED | OUTPATIENT
Start: 2024-03-05

## 2024-03-05 NOTE — TELEPHONE ENCOUNTER
Procedure:  Cardioversion  Doctor:  Dr. Haji  Date:  3/11/24  Time:  12pm  Arrival:  10:30am  Reps:  n/a  Anesthesia:  n/a      Spoke with patient. Please have patient arrive to the main entrance of [facility] ([address] [place], [place] [id number]) and check in with the registration desk.  They will be directed to the Cath Lab.  Remind patient to be NPO after midnight (8 hours prior). Do not apply lotions/creams on skin the day of procedure.

## 2024-03-05 NOTE — TELEPHONE ENCOUNTER
Front-please look at appointments scheduled. CMV is at [facility] on 4/23/2024. Need to R/S?

## 2024-03-05 NOTE — PATIENT INSTRUCTIONS
Plan:  Start valsartan 40 mg nightly for improved heart function  Labs in 1 month: BNP, BMP  Follow up with Dr. Fowler on 4/23/2024 at 11:15

## 2024-03-05 NOTE — TELEPHONE ENCOUNTER
Patient has cardioversion on 3/11 with Dr. Haji. Patient is scheduled to see LOY on 4/23 @ [facility] as f/u and CMV on 4/23 @ [facility] as f/u from [facility] appt. Does patient need to keep both of these appts or reschedule?

## 2024-03-06 ENCOUNTER — TELEPHONE (OUTPATIENT)
Dept: CARDIOLOGY CLINIC | Age: 79
End: 2024-03-06

## 2024-03-06 NOTE — TELEPHONE ENCOUNTER
It would be better to start valsartan now.  Then he will be more likely to stay in normal rhythm if he has been taking it.  LOY

## 2024-03-06 NOTE — TELEPHONE ENCOUNTER
Pt wife states pt is scheduled for a cardioversion with CMV on 03.11.24. Pt would like to know if he should wait to start the valsartan until after cardioversion. Please advise.

## 2024-03-06 NOTE — TELEPHONE ENCOUNTER
Called and spoke with patient and relayed medication instructions. Patient gave V/U.         MEDS TO HOLD:  - vitamin C, vitamin B complex, calcium/vitamin D, fiber, glucosamine, omega 3-6-9, multivitamin and zinc the morning of the procedure

## 2024-03-11 ENCOUNTER — HOSPITAL ENCOUNTER (OUTPATIENT)
Dept: CARDIAC CATH/INVASIVE PROCEDURES | Age: 79
Discharge: HOME | End: 2024-03-11
Attending: INTERNAL MEDICINE | Admitting: INTERNAL MEDICINE
Payer: MEDICARE

## 2024-03-11 VITALS
RESPIRATION RATE: 26 BRPM | SYSTOLIC BLOOD PRESSURE: 151 MMHG | HEART RATE: 62 BPM | DIASTOLIC BLOOD PRESSURE: 75 MMHG | OXYGEN SATURATION: 100 %

## 2024-03-11 LAB
ANION GAP SERPL CALCULATED.3IONS-SCNC: 9 MMOL/L (ref 3–16)
BUN SERPL-MCNC: 20 MG/DL (ref 7–20)
CALCIUM SERPL-MCNC: 9.5 MG/DL (ref 8.3–10.6)
CHLORIDE SERPL-SCNC: 103 MMOL/L (ref 99–110)
CO2 SERPL-SCNC: 26 MMOL/L (ref 21–32)
CREAT SERPL-MCNC: 0.8 MG/DL (ref 0.8–1.3)
DEPRECATED RDW RBC AUTO: 13.3 % (ref 12.4–15.4)
EKG ATRIAL RATE: 394 BPM
EKG ATRIAL RATE: 75 BPM
EKG DIAGNOSIS: NORMAL
EKG DIAGNOSIS: NORMAL
EKG P AXIS: 59 DEGREES
EKG P-R INTERVAL: 252 MS
EKG Q-T INTERVAL: 426 MS
EKG Q-T INTERVAL: 430 MS
EKG QRS DURATION: 90 MS
EKG QRS DURATION: 92 MS
EKG QTC CALCULATION (BAZETT): 456 MS
EKG QTC CALCULATION (BAZETT): 480 MS
EKG R AXIS: -13 DEGREES
EKG R AXIS: -20 DEGREES
EKG T AXIS: 15 DEGREES
EKG T AXIS: 21 DEGREES
EKG VENTRICULAR RATE: 69 BPM
EKG VENTRICULAR RATE: 75 BPM
GFR SERPLBLD CREATININE-BSD FMLA CKD-EPI: >60 ML/MIN/{1.73_M2}
GLUCOSE SERPL-MCNC: 92 MG/DL (ref 70–99)
HCT VFR BLD AUTO: 41.7 % (ref 40.5–52.5)
HGB BLD-MCNC: 14 G/DL (ref 13.5–17.5)
MCH RBC QN AUTO: 32.9 PG (ref 26–34)
MCHC RBC AUTO-ENTMCNC: 33.7 G/DL (ref 31–36)
MCV RBC AUTO: 97.5 FL (ref 80–100)
PLATELET # BLD AUTO: 184 K/UL (ref 135–450)
PMV BLD AUTO: 8.7 FL (ref 5–10.5)
POTASSIUM SERPL-SCNC: 4.1 MMOL/L (ref 3.5–5.1)
RBC # BLD AUTO: 4.27 M/UL (ref 4.2–5.9)
SODIUM SERPL-SCNC: 138 MMOL/L (ref 136–145)
WBC # BLD AUTO: 4.7 K/UL (ref 4–11)

## 2024-03-11 PROCEDURE — 92960 CARDIOVERSION ELECTRIC EXT: CPT

## 2024-03-11 PROCEDURE — 93005 ELECTROCARDIOGRAM TRACING: CPT | Performed by: INTERNAL MEDICINE

## 2024-03-11 PROCEDURE — 85027 COMPLETE CBC AUTOMATED: CPT

## 2024-03-11 PROCEDURE — 80048 BASIC METABOLIC PNL TOTAL CA: CPT

## 2024-03-11 PROCEDURE — 93010 ELECTROCARDIOGRAM REPORT: CPT | Performed by: INTERNAL MEDICINE

## 2024-03-11 PROCEDURE — 2500000003 HC RX 250 WO HCPCS

## 2024-03-11 PROCEDURE — 92960 CARDIOVERSION ELECTRIC EXT: CPT | Performed by: INTERNAL MEDICINE

## 2024-03-11 RX ORDER — SODIUM CHLORIDE 0.9 % (FLUSH) 0.9 %
5-40 SYRINGE (ML) INJECTION PRN
Status: DISCONTINUED | OUTPATIENT
Start: 2024-03-11 | End: 2024-03-11 | Stop reason: HOSPADM

## 2024-03-11 RX ORDER — AMIODARONE HYDROCHLORIDE 200 MG/1
200 TABLET ORAL DAILY
Qty: 90 TABLET | Refills: 1 | Status: SHIPPED | OUTPATIENT
Start: 2024-03-11 | End: 2024-06-09

## 2024-03-11 RX ORDER — SODIUM CHLORIDE 9 MG/ML
INJECTION, SOLUTION INTRAVENOUS PRN
Status: DISCONTINUED | OUTPATIENT
Start: 2024-03-11 | End: 2024-03-11 | Stop reason: HOSPADM

## 2024-03-11 RX ORDER — SODIUM CHLORIDE 0.9 % (FLUSH) 0.9 %
5-40 SYRINGE (ML) INJECTION EVERY 12 HOURS SCHEDULED
Status: DISCONTINUED | OUTPATIENT
Start: 2024-03-11 | End: 2024-03-11 | Stop reason: HOSPADM

## 2024-03-11 NOTE — PROGRESS NOTES
Patient/family verbalize understanding of discharge instructions, all questions addressed, copy given to patient/family. Pt transferred to vehicle via wheelchair to be discharged home with family.

## 2024-03-11 NOTE — PROCEDURES
[facility]     Electrophysiology Procedure Note       Date of Procedure: 3/11/2024  Patient's Name: Lenin Carr  YOB: 1945   Medical Record Number: [id number]  Procedure Performed by: Tyrel Haji MD    Procedures performed:  External Electrical cardioversion   IV sedation.   Brevital Sodium: 50 Mg     Indication of the procedure:   Persistent atrial fibrillation    Details of procedure:   The patient was brought to the cath lab area in a fasting and non-sedated state. The risks, benefits and alternatives of the procedure were discussed with the patient. The patient opted to proceed with the procedure. Written informed consent was signed and placed in the chart.  A timeout protocol was completed to identify the patient and the procedure being performed.     Sedation   Brevital was given by physician prior to cardioversion     ALICE  A transesophageal echo was not performed given that the patient had not missed any doses of anticoagulation in the past 3 weeks    Cardioversion  50 mg Brevital was given by me as single dose, and electrical DC cardioversion was perfomred using 200 J, synchronized shock.     Patient was converted to sinus rhythm.     The patient tolerated the procedure well and there were no complications.     Assessment:   Successful external DC cardioversion of persistent atrial fibrillation    Plan:  Continue anticoagulation, uninterrupted for 1 months post cardioversion  Continue amiodarone, 200 mg daily  EKG in 2 weeks  Follow up in electrophysiolgy clinic with Dr Haji in 2 to 3 months    Tyrel Haji MD  [facility]   Office: [phone]  Fax: [phone]

## 2024-03-11 NOTE — H&P
EXTRA STRENGTH)  MG tablet Take 1 tablet by mouth nightly as needed    Keena Brown MD   BREO ELLIPTA 200-25 MCG/ACT AEPB inhaler Inhale 1 puff by mouth once daily 9/5/23   Clement Lebron MD   ELIQUIS 5 MG TABS tablet Take 1 tablet by mouth twice daily 6/5/23 3/11/24  Clement Lebron MD   FIBER PO Take by mouth    Keena Brown MD   albuterol sulfate HFA (PROVENTIL HFA) 108 (90 Base) MCG/ACT inhaler Inhale 2 puffs into the lungs every 6 hours as needed for Wheezing 8/15/22   Clement Lebron MD   Glucos-MSM-C-Ak-Rcdpux-Aimpgr (GLUCOSAMINE MSM COMPLEX PO) Take by mouth daily    Keena Brown MD   B Complex-C (SUPER B COMPLEX PO) Take by mouth daily    Keena Brown MD   Omega 3-6-9 Fatty Acids (OMEGA-3-6-9 PO) Take by mouth daily    Keena Brown MD   Calcium-Magnesium-Vitamin D (CALCIUM MAGNESIUM PO) Take 1 tablet by mouth daily at 1800 Dose is 1200/400    Keena Brown MD   therapeutic multivitamin-minerals (THERAGRAN-M) tablet Take 1 tablet by mouth daily    ProviderKeena MD         Pre-sedation Documentation and Exam:  I have personally completed a history, physical exam & review of systems for this patient (see notes).    BP (!) 151/75   Pulse 62   Resp 26   SpO2 100%   NAD  Chest clear non labored  Heart regular rate, irregular rhythm  Abd soft non tender  No focal neuro deficits  Appropriate mood and affect       Mallampati Airway Assessment:  II     ASA Classification:  Class II - A patient with mild systemic disease     Sedation/Anesthesia Plan:  moderate     Medications Planned:  Brevital intravenously         Tyrel Haji MD  [facility]   Office: [phone]  Fax: [phone]

## 2024-03-13 ENCOUNTER — TELEPHONE (OUTPATIENT)
Dept: CARDIOLOGY CLINIC | Age: 79
End: 2024-03-13

## 2024-03-13 NOTE — TELEPHONE ENCOUNTER
Pt and pts wife stated that pt had a cardioversion with cmv on 03/11/24. Since then pt still has been experiencing fatigue and dizziness. They do believe he is in sinus rhytjem and not afib. They are not sure if the symptoms are him recovering from the cardioversion or from a sinus infection.     Pts vitals on 03/13/2024 were 137/75 bp and hr 68-73. Oxygen usually runs at 98% and it is at 94%    Please advise.

## 2024-03-14 NOTE — TELEPHONE ENCOUNTER
[facility] staff, are you able to contact the pt to rs pt's 3/25/24 EKG for today if possible at your location given the pt's residence is [place]?

## 2024-03-14 NOTE — TELEPHONE ENCOUNTER
Called patients wi to schedule an EKG.  She says that he has covid and that is his problem.  She states that he is still in sinus rhythm.

## 2024-03-14 NOTE — TELEPHONE ENCOUNTER
Tyrel Haji MD  [facility] Ep9 hours ago (9:47 PM)       He can come in for EKG but heart rates sound like sinus, symptoms do not sound related to cardioversion.    Raissa

## 2024-03-25 ENCOUNTER — NURSE ONLY (OUTPATIENT)
Dept: CARDIOLOGY CLINIC | Age: 79
End: 2024-03-25
Payer: MEDICARE

## 2024-03-25 DIAGNOSIS — I48.91 NEW ONSET A-FIB (HCC): Primary | ICD-10-CM

## 2024-03-25 PROCEDURE — 93000 ELECTROCARDIOGRAM COMPLETE: CPT | Performed by: INTERNAL MEDICINE

## 2024-04-02 DIAGNOSIS — K21.9 GASTROESOPHAGEAL REFLUX DISEASE WITHOUT ESOPHAGITIS: ICD-10-CM

## 2024-04-02 DIAGNOSIS — E78.2 MIXED HYPERLIPIDEMIA: ICD-10-CM

## 2024-04-02 LAB — B-TYPE NATRIURETIC PEPTIDE: 523 PG/ML

## 2024-04-02 RX ORDER — ATORVASTATIN CALCIUM 20 MG/1
TABLET, FILM COATED ORAL
Qty: 90 TABLET | Refills: 3 | Status: SHIPPED | OUTPATIENT
Start: 2024-04-02

## 2024-04-02 RX ORDER — OMEPRAZOLE 20 MG/1
20 CAPSULE, DELAYED RELEASE ORAL DAILY
Qty: 90 CAPSULE | Refills: 3 | Status: SHIPPED | OUTPATIENT
Start: 2024-04-02 | End: 2025-03-28

## 2024-04-15 ENCOUNTER — TELEPHONE (OUTPATIENT)
Dept: CARDIOLOGY CLINIC | Age: 79
End: 2024-04-15

## 2024-04-15 NOTE — TELEPHONE ENCOUNTER
----- Message from Sindi Fowler MD sent at 4/11/2024  5:15 PM EDT -----  Please call patient and let him know that his labs are very good on the valsartan.  No changes.  LOY  ----- Message -----  From: Joycelyn Figueroa MA  Sent: 4/5/2024   9:24 AM EDT  To: Sindi Fowler MD

## 2024-04-15 NOTE — PROGRESS NOTES
[facility]  Advanced CHF/Pulmonary Hypertension   Cardiac Evaluation      Lenin Carr  YOB: 1945    Date of Visit:  4/16/24      Chief Complaint   Patient presents with    Follow-up    Congestive Heart Failure        History of Present Illness:  Lenin Carr is a 79 y.o. male who presents from referral from Dr. Baeza for consultation and management of heart failure. He has a history of HLD, PAF s/p DCCV 4/2023, COPD, SOB, CAD.     In 2016 he had a LHC that showed minimal luminal irregularities.    In 1/2023 echo showed LVEF 50-55%.     In 11/2023 stress test was abnormal. On 11/20/2023 he underwent LHC that showed equivocal LM stenosis, and reduced LV EF of 45%.     At his last visit with NPRB 12/22/23 an echo and PFT test was ordered. PFT test 1/2024 showed normal response. Echo 2/20/2024 EF 55%,     LOV, 3/5/2024, He is here with his wife who is a retired nurse. 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 He says he tries to pace himself when working which helps his SOB. He follows with Dr. Haji for afib. He says he is planning to schedule for a cardioversion.     On 3/11/24 he had an external Dc cardioversion for persistent afib.     Today, 4/16/24, He repots he is doing well. He recently recovered from COVID. He reports occasional shortness of breath and fatigue when over exerting himself. 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 He says he can tell he feels an improvement since his cardioversion. He started taking valsartan in the morning. Patient is taking all cardiac medications as prescribed and tolerates them well.  Patient denies current edema, chest pain, palpitations, dizziness or syncope.         NYHA:2-3      Allergies   Allergen Reactions    Bee Venom     Pcn [Penicillins] Swelling    Wheat Extract Other (See Comments)     Celiac disease     Current Outpatient Medications   Medication Sig Dispense Refill    omeprazole (PRILOSEC) 20 MG delayed release

## 2024-04-16 ENCOUNTER — OFFICE VISIT (OUTPATIENT)
Dept: CARDIOLOGY CLINIC | Age: 79
End: 2024-04-16

## 2024-04-16 VITALS
HEART RATE: 63 BPM | SYSTOLIC BLOOD PRESSURE: 112 MMHG | BODY MASS INDEX: 32 KG/M2 | HEIGHT: 70 IN | DIASTOLIC BLOOD PRESSURE: 72 MMHG | WEIGHT: 223.5 LBS | OXYGEN SATURATION: 96 %

## 2024-04-16 DIAGNOSIS — R06.02 SOB (SHORTNESS OF BREATH) ON EXERTION: ICD-10-CM

## 2024-04-16 DIAGNOSIS — I50.22 SYSTOLIC CHF, CHRONIC (HCC): Primary | ICD-10-CM

## 2024-04-16 DIAGNOSIS — E78.2 MIXED HYPERLIPIDEMIA: ICD-10-CM

## 2024-04-16 DIAGNOSIS — I25.10 CORONARY ARTERY DISEASE INVOLVING NATIVE CORONARY ARTERY OF NATIVE HEART WITHOUT ANGINA PECTORIS: ICD-10-CM

## 2024-04-16 DIAGNOSIS — I48.0 PAROXYSMAL ATRIAL FIBRILLATION (HCC): ICD-10-CM

## 2024-04-16 RX ORDER — SPIRONOLACTONE 25 MG/1
TABLET ORAL
Qty: 36 TABLET | Refills: 3 | Status: SHIPPED | OUTPATIENT
Start: 2024-04-16

## 2024-04-16 NOTE — PATIENT INSTRUCTIONS
Plan:  Start spironolactone (aldactone) 25 mg in the morning on Monday, Wednesday, Friday  Continue all other cardiac medications  Labs 1 month: bmp, bnp  Follow up with me in 3 months

## 2024-04-22 NOTE — PROGRESS NOTES
[facility]   Electrophysiology Consultation     Date: 4/23/2024  Reason for Consultation: Atrial fibrillation management   Consult Requesting Provider: Daya Perkins CNP.     CC: \"Shortness of breath\"    HPI: Lenin Carr is a 79 y.o. male history of hypertension, hyperlipidemia, COPD, sinus bradycardia, prostate cancer, equivocal left main disease diagnosed on a heart catheterization from November 2023, nonischemic cardiomyopathy with mildly reduced LVEF of 45%, paroxysmal atrial fibrillation, cardioversion in April 2023.  Was initially seen and evaluated January 2024 for the management of atrial fibrillation. Complaints of dyspnea and fatigue, echo 2/20 with preserved LVEF of 55%.  Patient was seen in electrophysiology clinic in February where he continued to experience fatigue, shortness of breath, underwent anticoagulation and administration of amiodarone on 3/11/20/2024, follow-up ECG at the end of March.  Sinus bradycardia.    Patient presents today for follow-up evaluation management of atrial fibrillation following cardioversion.  Several days post cardioversion developed COVID but following this he has felt much better in sinus rhythm.  He is able to do laborist work around the yard without significant limitation, previously was limited by fatigue and shortness of breath, [place] which she could not do prior.  He is still prescribed amiodarone although has not been taking this for the past 2 weeks since he has been out of refills.  Denies dizziness or lightheadedness.  Remains on Eliquis.    Review of System:  [x] Full ROS obtained and negative except as mentioned in HPI or below    Prior to Admission medications    Medication Sig Start Date End Date Taking? Authorizing Provider   spironolactone (ALDACTONE) 25 MG tablet Take one tablet on Monday, Wednesday, Friday 4/16/24  Yes Sindi Fowler MD   atorvastatin (LIPITOR) 20 MG tablet Take 1

## 2024-04-23 ENCOUNTER — OFFICE VISIT (OUTPATIENT)
Dept: CARDIOLOGY CLINIC | Age: 79
End: 2024-04-23
Payer: MEDICARE

## 2024-04-23 VITALS
HEART RATE: 56 BPM | OXYGEN SATURATION: 98 % | SYSTOLIC BLOOD PRESSURE: 128 MMHG | DIASTOLIC BLOOD PRESSURE: 68 MMHG | HEIGHT: 70 IN | WEIGHT: 224 LBS | BODY MASS INDEX: 32.07 KG/M2

## 2024-04-23 DIAGNOSIS — I48.91 NEW ONSET A-FIB (HCC): Primary | ICD-10-CM

## 2024-04-23 PROCEDURE — 93000 ELECTROCARDIOGRAM COMPLETE: CPT | Performed by: INTERNAL MEDICINE

## 2024-04-23 PROCEDURE — 99214 OFFICE O/P EST MOD 30 MIN: CPT | Performed by: INTERNAL MEDICINE

## 2024-04-23 PROCEDURE — G8427 DOCREV CUR MEDS BY ELIG CLIN: HCPCS | Performed by: INTERNAL MEDICINE

## 2024-04-23 PROCEDURE — 1123F ACP DISCUSS/DSCN MKR DOCD: CPT | Performed by: INTERNAL MEDICINE

## 2024-04-23 PROCEDURE — 1036F TOBACCO NON-USER: CPT | Performed by: INTERNAL MEDICINE

## 2024-04-23 PROCEDURE — G8417 CALC BMI ABV UP PARAM F/U: HCPCS | Performed by: INTERNAL MEDICINE

## 2024-04-23 RX ORDER — AMIODARONE HYDROCHLORIDE 200 MG/1
100 TABLET ORAL DAILY
Qty: 45 TABLET | Refills: 1 | Status: SHIPPED | OUTPATIENT
Start: 2024-04-23 | End: 2024-10-20

## 2024-04-25 ENCOUNTER — TELEPHONE (OUTPATIENT)
Dept: CARDIOLOGY CLINIC | Age: 79
End: 2024-04-25

## 2024-04-25 NOTE — TELEPHONE ENCOUNTER
CMV- pt last ov on 04/23/2024. You wanted pt to follow up in 6 months. Did you want that with you or EP NP?

## 2024-04-25 NOTE — TELEPHONE ENCOUNTER
PT called to r/s cmv ov with nplr. Note from call: 4/25 LVM to rs ep appt with nplr ; Need confirmation before scheduling since NPLR is not with EP?

## 2024-04-25 NOTE — TELEPHONE ENCOUNTER
Tyrel Haji MD  [facility] Ep5 minutes ago (2:20 PM)       EP NP is fine.    Thanks    Raissa       Front- pt needs to pt scheduled with EP NP

## 2024-04-25 NOTE — TELEPHONE ENCOUNTER
4/25- called pt @[phone]. Lvm informing pt CMV would like pt to f/u with a EPNP in 6M. I informed pt 10/1/2024 apt with CMV is now canceled and to call back to schedule with a EPNP. Pt needs 6M f/u scheduled with NPAM. (around 10/2025)    LOV 4/23/2024 CMV

## 2024-05-15 ENCOUNTER — PATIENT MESSAGE (OUTPATIENT)
Dept: CARDIOLOGY CLINIC | Age: 79
End: 2024-05-15

## 2024-05-15 NOTE — TELEPHONE ENCOUNTER
From: Lenin Carr  To: Dr. Sindi Fowler  Sent: 5/15/2024 9:05 AM EDT  Subject: Spironolactone     Tera is a little confused. He was told to take 1/2 tablet Mon., Wed., and Fri., but the prescription bottle says to take 1 tablet. I know Dr. Fowler wanted him on 100 mg, but insurance wouldn't pay for that and she ordered 200 mg. tablet. Could you please check with Dr. Fowler and make sure he is only suppose to take 100 mg (1/2 tab) 3 times per week. Thank you.

## 2024-05-15 NOTE — TELEPHONE ENCOUNTER
Stay on 1/2 spironolactone 3 days a week until we get the labs back.  Dr. Haji will weigh in on the amiodarone.  LOY

## 2024-05-15 NOTE — TELEPHONE ENCOUNTER
Spoke with pt.'s wife. She says for the past month the pt has been taking amiodarone 200 mg daily (supposed to take 100 mg daily) and aldactone 12.5 M, W, F (supposed to take 25 mg M,W,F). She says pt had the amiodarone and aldactone confused. Pt will be getting labs for LOY Friday. Please advise on any additional testing needed. Will route to EP to review as well.     LOV CMV 4/23/24  LOV LOY 4/16/24

## 2024-05-16 NOTE — TELEPHONE ENCOUNTER
Tyrel Haji MD  [facility] Ep56 minutes ago (11:33 AM)       Agree with 100mg, we decreased it for sinus bradycardia.    Thanks    Raissa

## 2024-06-03 DIAGNOSIS — I48.0 PAROXYSMAL ATRIAL FIBRILLATION (HCC): ICD-10-CM

## 2024-06-03 RX ORDER — APIXABAN 5 MG/1
TABLET, FILM COATED ORAL
Qty: 180 TABLET | Refills: 3 | Status: SHIPPED | OUTPATIENT
Start: 2024-06-03 | End: 2024-09-01

## 2024-07-22 NOTE — PROGRESS NOTES
[facility]  Advanced CHF/Pulmonary Hypertension   Cardiac Evaluation      Lenin Carr  YOB: 1945    Date of Visit:  7/23/24      Chief Complaint   Patient presents with    Follow-up    Congestive Heart Failure        History of Present Illness:  Lenin Carr is a 79 y.o. male who presents from referral from Dr. Baeza for consultation and management of heart failure. He has a history of HLD, PAF s/p DCCV 4/2023, COPD, SOB, CAD.     In 2016 he had a LHC that showed minimal luminal irregularities.    In 1/2023 echo showed LVEF 50-55%.     In 11/2023 stress test was abnormal. On 11/20/2023 he underwent LHC that showed equivocal LM stenosis, and reduced LV EF of 45%.     At his last visit with NPRB 12/22/23 an echo and PFT test was ordered. PFT test 1/2024 showed normal response. Echo 2/20/2024 EF 55%,     OV, 3/5/2024, He is here with his wife who is a retired nurse. 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 He says he tries to pace himself when working which helps his SOB. He follows with Dr. Haji for afib. He says he is planning to schedule for a cardioversion.     On 3/11/24 he had an external Dc cardioversion for persistent afib.     LOV, 4/16/24, He repots he is doing well. He recently recovered from COVID. He reports occasional shortness of breath and fatigue when over exerting himself. 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 He says he can tell he feels an improvement since his cardioversion. He started taking valsartan in the morning.     Today, 7/23/24, He presents with his wife. He reports he is doing well overall. 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 He reports he is not able to tell if he goes in afib. He regularly check his blood pressure and weight at home. Patient is taking all cardiac medications as prescribed and tolerates them well.  Patient denies current edema, chest pain, shortness of breath, palpitations, dizziness or

## 2024-07-23 ENCOUNTER — OFFICE VISIT (OUTPATIENT)
Dept: CARDIOLOGY CLINIC | Age: 79
End: 2024-07-23

## 2024-07-23 VITALS
SYSTOLIC BLOOD PRESSURE: 138 MMHG | HEIGHT: 70 IN | WEIGHT: 219.5 LBS | HEART RATE: 48 BPM | DIASTOLIC BLOOD PRESSURE: 60 MMHG | OXYGEN SATURATION: 97 % | BODY MASS INDEX: 31.42 KG/M2

## 2024-07-23 DIAGNOSIS — I50.22 SYSTOLIC CHF, CHRONIC (HCC): Primary | ICD-10-CM

## 2024-07-23 DIAGNOSIS — I48.91 NEW ONSET A-FIB (HCC): ICD-10-CM

## 2024-07-23 DIAGNOSIS — I42.1 HYPERTROPHIC OBSTRUCTIVE CARDIOMYOPATHY (HCC): ICD-10-CM

## 2024-07-23 DIAGNOSIS — E78.2 MIXED HYPERLIPIDEMIA: ICD-10-CM

## 2024-07-23 DIAGNOSIS — R06.02 SOB (SHORTNESS OF BREATH) ON EXERTION: ICD-10-CM

## 2024-07-23 DIAGNOSIS — I25.10 CORONARY ARTERY DISEASE INVOLVING NATIVE CORONARY ARTERY OF NATIVE HEART WITHOUT ANGINA PECTORIS: ICD-10-CM

## 2024-07-23 NOTE — PATIENT INSTRUCTIONS
Your provider has ordered testing for further evaluation.  An order/prescription has been included in your paper work.   To schedule outpatient testing, contact Central Scheduling by calling [employer] ([phone]).    Plan:  Continue current cardiac medications   Labs in August : Fasting lipids, CBC, CMP, BNP  Discussed starting a beta blocker. Will hold off given bradycardia   Follow up in February with echocardiogram

## 2024-08-16 ENCOUNTER — LAB (OUTPATIENT)
Dept: FAMILY MEDICINE CLINIC | Age: 79
End: 2024-08-16
Payer: MEDICARE

## 2024-08-16 DIAGNOSIS — I50.22 SYSTOLIC CHF, CHRONIC (HCC): ICD-10-CM

## 2024-08-16 DIAGNOSIS — E78.2 MIXED HYPERLIPIDEMIA: ICD-10-CM

## 2024-08-16 DIAGNOSIS — I48.91 NEW ONSET A-FIB (HCC): ICD-10-CM

## 2024-08-16 DIAGNOSIS — Z85.46 H/O PROSTATE CANCER: ICD-10-CM

## 2024-08-16 DIAGNOSIS — I42.1 HYPERTROPHIC OBSTRUCTIVE CARDIOMYOPATHY (HCC): ICD-10-CM

## 2024-08-16 DIAGNOSIS — R53.83 FATIGUE, UNSPECIFIED TYPE: ICD-10-CM

## 2024-08-16 DIAGNOSIS — I25.10 CORONARY ARTERY DISEASE INVOLVING NATIVE CORONARY ARTERY OF NATIVE HEART WITHOUT ANGINA PECTORIS: ICD-10-CM

## 2024-08-16 DIAGNOSIS — D64.9 ANEMIA, UNSPECIFIED TYPE: Primary | ICD-10-CM

## 2024-08-16 LAB
ALBUMIN SERPL-MCNC: 4.3 G/DL (ref 3.4–5)
ALBUMIN/GLOB SERPL: 2 {RATIO} (ref 1.1–2.2)
ALP SERPL-CCNC: 117 U/L (ref 40–129)
ALT SERPL-CCNC: 19 U/L (ref 10–40)
ANION GAP SERPL CALCULATED.3IONS-SCNC: 10 MMOL/L (ref 3–16)
AST SERPL-CCNC: 24 U/L (ref 15–37)
BASOPHILS # BLD: 0 K/UL (ref 0–0.2)
BASOPHILS NFR BLD: 0.9 %
BILIRUB DIRECT SERPL-MCNC: 0.2 MG/DL (ref 0–0.3)
BILIRUB INDIRECT SERPL-MCNC: 0.2 MG/DL (ref 0–1)
BILIRUB SERPL-MCNC: 0.4 MG/DL (ref 0–1)
BUN SERPL-MCNC: 13 MG/DL (ref 7–20)
CALCIUM SERPL-MCNC: 8.8 MG/DL (ref 8.3–10.6)
CHLORIDE SERPL-SCNC: 103 MMOL/L (ref 99–110)
CHOLEST SERPL-MCNC: 133 MG/DL (ref 0–199)
CO2 SERPL-SCNC: 27 MMOL/L (ref 21–32)
CREAT SERPL-MCNC: 1 MG/DL (ref 0.8–1.3)
DEPRECATED RDW RBC AUTO: 12.9 % (ref 12.4–15.4)
EOSINOPHIL # BLD: 0.1 K/UL (ref 0–0.6)
EOSINOPHIL NFR BLD: 1.7 %
GFR SERPLBLD CREATININE-BSD FMLA CKD-EPI: 77 ML/MIN/{1.73_M2}
GLUCOSE SERPL-MCNC: 87 MG/DL (ref 70–99)
HCT VFR BLD AUTO: 40 % (ref 40.5–52.5)
HDLC SERPL-MCNC: 51 MG/DL (ref 40–60)
HGB BLD-MCNC: 13.7 G/DL (ref 13.5–17.5)
LDLC SERPL CALC-MCNC: 67 MG/DL
LYMPHOCYTES # BLD: 0.9 K/UL (ref 1–5.1)
LYMPHOCYTES NFR BLD: 21.3 %
MCH RBC QN AUTO: 33.5 PG (ref 26–34)
MCHC RBC AUTO-ENTMCNC: 34.2 G/DL (ref 31–36)
MCV RBC AUTO: 98 FL (ref 80–100)
MONOCYTES # BLD: 0.3 K/UL (ref 0–1.3)
MONOCYTES NFR BLD: 7.9 %
NEUTROPHILS # BLD: 2.9 K/UL (ref 1.7–7.7)
NEUTROPHILS NFR BLD: 68.2 %
NT-PROBNP SERPL-MCNC: 384 PG/ML (ref 0–449)
PLATELET # BLD AUTO: 192 K/UL (ref 135–450)
PMV BLD AUTO: 9.8 FL (ref 5–10.5)
POTASSIUM SERPL-SCNC: 4.3 MMOL/L (ref 3.5–5.1)
PROT SERPL-MCNC: 6.5 G/DL (ref 6.4–8.2)
RBC # BLD AUTO: 4.08 M/UL (ref 4.2–5.9)
SODIUM SERPL-SCNC: 140 MMOL/L (ref 136–145)
T4 FREE SERPL-MCNC: 1.5 NG/DL (ref 0.9–1.8)
TRIGL SERPL-MCNC: 74 MG/DL (ref 0–150)
TSH SERPL DL<=0.005 MIU/L-ACNC: 4 UIU/ML (ref 0.27–4.2)
VLDLC SERPL CALC-MCNC: 15 MG/DL
WBC # BLD AUTO: 4.3 K/UL (ref 4–11)

## 2024-08-16 PROCEDURE — 36415 COLL VENOUS BLD VENIPUNCTURE: CPT | Performed by: FAMILY MEDICINE

## 2024-08-16 NOTE — PROGRESS NOTES
Blood drawn per order.  Needle size: 23 g  Site: L Antecubital.  First attempt successful Yes    Second attempt no    Pressure applied until bleeding stopped.  Prressure applied.    Patient informed to call office or return if bleeding reoccurs and unable to stop.    Tubes drawn: 1 purple     3 red

## 2024-08-17 LAB
CHOLEST SERPL-MCNC: 138 MG/DL (ref 0–199)
HDLC SERPL-MCNC: 48 MG/DL (ref 40–60)
LDL CHOLESTEROL: 75 MG/DL
PSA SERPL DL<=0.01 NG/ML-MCNC: <0.02 NG/ML (ref 0–4)
TRIGL SERPL-MCNC: 77 MG/DL (ref 0–150)
VLDLC SERPL CALC-MCNC: 15 MG/DL

## 2024-08-20 SDOH — ECONOMIC STABILITY: FOOD INSECURITY: WITHIN THE PAST 12 MONTHS, YOU WORRIED THAT YOUR FOOD WOULD RUN OUT BEFORE YOU GOT MONEY TO BUY MORE.: NEVER TRUE

## 2024-08-20 SDOH — ECONOMIC STABILITY: FOOD INSECURITY: WITHIN THE PAST 12 MONTHS, THE FOOD YOU BOUGHT JUST DIDN'T LAST AND YOU DIDN'T HAVE MONEY TO GET MORE.: NEVER TRUE

## 2024-08-20 SDOH — ECONOMIC STABILITY: INCOME INSECURITY: HOW HARD IS IT FOR YOU TO PAY FOR THE VERY BASICS LIKE FOOD, HOUSING, MEDICAL CARE, AND HEATING?: NOT VERY HARD

## 2024-08-23 ENCOUNTER — OFFICE VISIT (OUTPATIENT)
Dept: FAMILY MEDICINE CLINIC | Age: 79
End: 2024-08-23

## 2024-08-23 VITALS
OXYGEN SATURATION: 98 % | HEART RATE: 52 BPM | BODY MASS INDEX: 31.47 KG/M2 | SYSTOLIC BLOOD PRESSURE: 108 MMHG | WEIGHT: 219.8 LBS | HEIGHT: 70 IN | DIASTOLIC BLOOD PRESSURE: 72 MMHG

## 2024-08-23 DIAGNOSIS — Z85.46 PERSONAL HISTORY OF PROSTATE CANCER: ICD-10-CM

## 2024-08-23 DIAGNOSIS — J44.9 CHRONIC OBSTRUCTIVE PULMONARY DISEASE, UNSPECIFIED COPD TYPE (HCC): ICD-10-CM

## 2024-08-23 DIAGNOSIS — I48.0 PAROXYSMAL ATRIAL FIBRILLATION (HCC): ICD-10-CM

## 2024-08-23 DIAGNOSIS — E78.2 MIXED HYPERLIPIDEMIA: ICD-10-CM

## 2024-08-23 DIAGNOSIS — I50.22 SYSTOLIC CHF, CHRONIC (HCC): ICD-10-CM

## 2024-08-23 DIAGNOSIS — Z00.00 MEDICARE ANNUAL WELLNESS VISIT, SUBSEQUENT: Primary | ICD-10-CM

## 2024-08-23 ASSESSMENT — LIFESTYLE VARIABLES
HOW MANY STANDARD DRINKS CONTAINING ALCOHOL DO YOU HAVE ON A TYPICAL DAY: PATIENT DOES NOT DRINK
HOW OFTEN DO YOU HAVE A DRINK CONTAINING ALCOHOL: NEVER

## 2024-08-23 ASSESSMENT — ENCOUNTER SYMPTOMS
SHORTNESS OF BREATH: 0
BLOOD IN STOOL: 0
DIARRHEA: 0
NAUSEA: 0

## 2024-08-23 ASSESSMENT — PATIENT HEALTH QUESTIONNAIRE - PHQ9
SUM OF ALL RESPONSES TO PHQ QUESTIONS 1-9: 0
1. LITTLE INTEREST OR PLEASURE IN DOING THINGS: NOT AT ALL
2. FEELING DOWN, DEPRESSED OR HOPELESS: NOT AT ALL
SUM OF ALL RESPONSES TO PHQ QUESTIONS 1-9: 0
SUM OF ALL RESPONSES TO PHQ QUESTIONS 1-9: 0
SUM OF ALL RESPONSES TO PHQ9 QUESTIONS 1 & 2: 0
SUM OF ALL RESPONSES TO PHQ QUESTIONS 1-9: 0

## 2024-08-23 NOTE — PROGRESS NOTES
Chief Complaint   Patient presents with    Hyperlipidemia       HPI:  Lenin Carr is a 79 y.o. (: 1945) here today   for   Hyperlipidemia  This is a chronic problem. The current episode started more than 1 year ago. Pertinent negatives include no chest pain or shortness of breath. Current antihyperlipidemic treatment includes statins. There are no compliance problems.  Risk factors for coronary artery disease include dyslipidemia and male sex.     Seen by cardio approx 1 mo ago.  Plans on f/u in February w/ echo.  No beta blocker due to bradycardia.  Seen by EP as well.  Afib.  Prior cardioversion.  Still taking amiodarone. Has been in sinus jason since last cardioversion and amiodarone.      Prior tx for prostate cancer (surgery).  No new issues.  Psa ok.      Hx of chf.  See above. Clara meds.      Still some occas cramping and pain to upper legs, enrike left    Patient's medications, allergies, past medical, surgical, social and family histories were reviewed and updated as appropriate.    ROS:  Review of Systems   Constitutional:  Negative for fever.   Respiratory:  Negative for shortness of breath.    Cardiovascular:  Negative for chest pain and palpitations.   Gastrointestinal:  Negative for blood in stool, diarrhea and nausea.           LDL Direct (MG/DL)   Date Value   2018 61.8       Past Medical History:   Diagnosis Date    Asthma     childhood    CAD (coronary artery disease)     Celiac sprue     COPD (chronic obstructive pulmonary disease) (HCC)     DDD (degenerative disc disease), lumbar     Fatty liver     HTN (hypertension)     Hyperlipidemia     Paget's disease     Prostate cancer (HCC) 2009       Family History   Problem Relation Age of Onset    Other Mother         macular degeneration, DVT     Colon Cancer Father     Other Brother         celiac    High Blood Pressure Brother     Diabetes Brother     Heart Attack Maternal Grandmother     Heart Attack Maternal Grandfather     Stroke

## 2024-08-23 NOTE — PROGRESS NOTES
Medicare Annual Wellness Visit    Lenin Carr is here for Hyperlipidemia    Assessment & Plan   Medicare annual wellness visit, subsequent  Chronic obstructive pulmonary disease, unspecified COPD type (HCC)  Paroxysmal atrial fibrillation (HCC)  Systolic CHF, chronic (HCC)  Personal history of prostate cancer  Mixed hyperlipidemia    Recommendations for Preventive Services Due: see orders and patient instructions/AVS.  Recommended screening schedule for the next 5-10 years is provided to the patient in written form: see Patient Instructions/AVS.     No follow-ups on file.     Subjective       Patient's complete Health Risk Assessment and screening values have been reviewed and are found in Flowsheets. The following problems were reviewed today and where indicated follow up appointments were made and/or referrals ordered.    Positive Risk Factor Screenings with Interventions:    Fall Risk:  Do you feel unsteady or are you worried about falling? : no  2 or more falls in past year?: (!) yes (tripped)  Fall with injury in past year?: no     Interventions:    Reviewed medications, home hazards, visual acuity, and co-morbidities that can increase risk for falls  See AVS for additional education material    Cognitive:   Clock Drawing Test (CDT): Normal  Words recalled: 0 Words Recalled  Total Score: (!) 2  Total Score Interpretation: Abnormal Mini-Cog  Interventions:  See AVS for additional education material              Abnormal BMI (obese):  Body mass index is 31.54 kg/m². (!) Abnormal  Interventions:  See AVS for additional education material          Vision Screen:  Do you have difficulty driving, watching TV, or doing any of your daily activities because of your eyesight?: No  Have you had an eye exam within the past year?: (!) No  Interventions:   See AVS for additional education material    Safety:  Do you have any tripping hazards - loose or unsecured carpets or rugs?: (!) Yes  Interventions:  See AVS for

## 2024-09-19 DIAGNOSIS — J44.9 CHRONIC OBSTRUCTIVE PULMONARY DISEASE, UNSPECIFIED COPD TYPE (HCC): ICD-10-CM

## 2024-09-19 RX ORDER — FLUTICASONE FUROATE AND VILANTEROL TRIFENATATE 200; 25 UG/1; UG/1
POWDER RESPIRATORY (INHALATION)
Qty: 60 EACH | Refills: 3 | Status: SHIPPED | OUTPATIENT
Start: 2024-09-19

## 2024-09-20 NOTE — CARE COORDINATION
Remote Patient Monitoring Note      Date/Time:  3/8/2023 8:27 AM    CCSS reviewed patients reported daily Remote Patient Monitoring metrics. All reported metrics are within alert parameters.     Plan/Follow Up: Will continue to review, monitor and address alerts with follow up based on severity of symptoms and risk factors   Current Patient Metrics ---- Blood Pressure: 153/63, 88bpm Pulseox: 96%, 75bpm Survey: C Weight: 227.5lbs Note Created at: 03/08/2023 08:27 AM ET ---- Time-Spent: 2 minutes 0 seconds   No

## 2024-10-04 DIAGNOSIS — I48.91 NEW ONSET A-FIB (HCC): ICD-10-CM

## 2024-10-04 RX ORDER — AMIODARONE HYDROCHLORIDE 200 MG/1
TABLET ORAL
Qty: 45 TABLET | Refills: 0 | Status: SHIPPED | OUTPATIENT
Start: 2024-10-04

## 2024-10-28 ENCOUNTER — OFFICE VISIT (OUTPATIENT)
Dept: CARDIOLOGY CLINIC | Age: 79
End: 2024-10-28

## 2024-10-28 VITALS
DIASTOLIC BLOOD PRESSURE: 66 MMHG | SYSTOLIC BLOOD PRESSURE: 140 MMHG | HEIGHT: 70 IN | OXYGEN SATURATION: 97 % | WEIGHT: 214.8 LBS | HEART RATE: 48 BPM | BODY MASS INDEX: 30.75 KG/M2

## 2024-10-28 DIAGNOSIS — I48.0 PAROXYSMAL ATRIAL FIBRILLATION (HCC): Primary | ICD-10-CM

## 2024-10-28 DIAGNOSIS — I48.91 NEW ONSET A-FIB (HCC): ICD-10-CM

## 2024-10-28 DIAGNOSIS — I10 ESSENTIAL HYPERTENSION: ICD-10-CM

## 2024-10-28 NOTE — PATIENT INSTRUCTIONS
No changes today     Continue current medications    Utilize [employer] to monitor heart rate and rhythm     Labs due in February     Follow up with me in 6 months or sooner if needed

## 2024-10-28 NOTE — PROGRESS NOTES
Eliquis   Continue amiodarone   TSH, LFT's and BMP due 2/2025  Monitor BP at home and call if consistently out of goal ranges  Follow up in 6 months or sooner if needed     RAYA Mello, DONTA-CNP  [facility] [place]  [phone]

## 2024-12-17 DIAGNOSIS — J44.9 CHRONIC OBSTRUCTIVE PULMONARY DISEASE, UNSPECIFIED COPD TYPE (HCC): ICD-10-CM

## 2024-12-17 RX ORDER — FLUTICASONE FUROATE AND VILANTEROL TRIFENATATE 200; 25 UG/1; UG/1
POWDER RESPIRATORY (INHALATION)
Qty: 180 EACH | Refills: 0 | Status: SHIPPED | OUTPATIENT
Start: 2024-12-17

## 2024-12-17 NOTE — TELEPHONE ENCOUNTER
Future appt scheduled 02/25/2025                      Last appt 08/23/2024      Last Written 09/19/2024    ILDEFONSO ELLIPTA 200-25 MCG/ACT AEPB inhaler   #60  3 RF

## 2025-01-10 DIAGNOSIS — I48.91 NEW ONSET A-FIB (HCC): ICD-10-CM

## 2025-01-10 RX ORDER — AMIODARONE HYDROCHLORIDE 200 MG/1
TABLET ORAL
Qty: 45 TABLET | Refills: 0 | Status: SHIPPED | OUTPATIENT
Start: 2025-01-10

## 2025-01-17 ENCOUNTER — TELEPHONE (OUTPATIENT)
Dept: FAMILY MEDICINE CLINIC | Age: 80
End: 2025-01-17

## 2025-01-17 DIAGNOSIS — R53.83 OTHER FATIGUE: ICD-10-CM

## 2025-01-17 DIAGNOSIS — E78.2 MIXED HYPERLIPIDEMIA: Primary | ICD-10-CM

## 2025-01-30 ENCOUNTER — TELEPHONE (OUTPATIENT)
Dept: CARDIOLOGY CLINIC | Age: 80
End: 2025-01-30

## 2025-01-30 DIAGNOSIS — I50.9 CONGESTIVE HEART FAILURE, UNSPECIFIED HF CHRONICITY, UNSPECIFIED HEART FAILURE TYPE (HCC): ICD-10-CM

## 2025-01-30 DIAGNOSIS — I50.22 SYSTOLIC CHF, CHRONIC (HCC): Primary | ICD-10-CM

## 2025-01-30 NOTE — TELEPHONE ENCOUNTER
Order placed. Please call pt to see if we can schedule echo same day as LOY OV for pt convenience.

## 2025-01-30 NOTE — TELEPHONE ENCOUNTER
Pt called to schedule f/u w/LOY, plan says pt needs a follow up echo for feb. I tried to schedule with the referral but it would not allow me to do so. Please advise.   Best call back number for Lenin: [phone]

## 2025-02-18 ENCOUNTER — LAB (OUTPATIENT)
Dept: FAMILY MEDICINE CLINIC | Age: 80
End: 2025-02-18

## 2025-02-18 DIAGNOSIS — E78.2 MIXED HYPERLIPIDEMIA: ICD-10-CM

## 2025-02-18 DIAGNOSIS — R53.83 OTHER FATIGUE: ICD-10-CM

## 2025-02-18 LAB
ALBUMIN SERPL-MCNC: 4.5 G/DL (ref 3.4–5)
ALBUMIN/GLOB SERPL: 1.9 {RATIO} (ref 1.1–2.2)
ALP SERPL-CCNC: 105 U/L (ref 40–129)
ALT SERPL-CCNC: 22 U/L (ref 10–40)
ANION GAP SERPL CALCULATED.3IONS-SCNC: 10 MMOL/L (ref 3–16)
AST SERPL-CCNC: 25 U/L (ref 15–37)
BILIRUB SERPL-MCNC: 0.4 MG/DL (ref 0–1)
BUN SERPL-MCNC: 19 MG/DL (ref 7–20)
CALCIUM SERPL-MCNC: 9.1 MG/DL (ref 8.3–10.6)
CHLORIDE SERPL-SCNC: 104 MMOL/L (ref 99–110)
CHOLEST SERPL-MCNC: 153 MG/DL (ref 0–199)
CO2 SERPL-SCNC: 26 MMOL/L (ref 21–32)
CREAT SERPL-MCNC: 0.9 MG/DL (ref 0.8–1.3)
GFR SERPLBLD CREATININE-BSD FMLA CKD-EPI: 87 ML/MIN/{1.73_M2}
GLUCOSE SERPL-MCNC: 91 MG/DL (ref 70–99)
HDLC SERPL-MCNC: 57 MG/DL (ref 40–60)
LDLC SERPL CALC-MCNC: 85 MG/DL
POTASSIUM SERPL-SCNC: 4.9 MMOL/L (ref 3.5–5.1)
PROT SERPL-MCNC: 6.9 G/DL (ref 6.4–8.2)
SODIUM SERPL-SCNC: 140 MMOL/L (ref 136–145)
T4 FREE SERPL-MCNC: 1.2 NG/DL (ref 0.9–1.8)
TRIGL SERPL-MCNC: 55 MG/DL (ref 0–150)
TSH SERPL DL<=0.005 MIU/L-ACNC: 3.57 UIU/ML (ref 0.27–4.2)
VLDLC SERPL CALC-MCNC: 11 MG/DL

## 2025-02-19 NOTE — RESULT ENCOUNTER NOTE
Thyroid labs normal.  Lipids a little higher than last check, but still well-controlled.  CMP normal.  Can discuss further at upcoming appointment

## 2025-02-22 SDOH — ECONOMIC STABILITY: INCOME INSECURITY: IN THE LAST 12 MONTHS, WAS THERE A TIME WHEN YOU WERE NOT ABLE TO PAY THE MORTGAGE OR RENT ON TIME?: NO

## 2025-02-22 SDOH — ECONOMIC STABILITY: FOOD INSECURITY: WITHIN THE PAST 12 MONTHS, THE FOOD YOU BOUGHT JUST DIDN'T LAST AND YOU DIDN'T HAVE MONEY TO GET MORE.: NEVER TRUE

## 2025-02-22 SDOH — ECONOMIC STABILITY: FOOD INSECURITY: WITHIN THE PAST 12 MONTHS, YOU WORRIED THAT YOUR FOOD WOULD RUN OUT BEFORE YOU GOT MONEY TO BUY MORE.: NEVER TRUE

## 2025-02-22 ASSESSMENT — PATIENT HEALTH QUESTIONNAIRE - PHQ9
SUM OF ALL RESPONSES TO PHQ9 QUESTIONS 1 & 2: 0
SUM OF ALL RESPONSES TO PHQ QUESTIONS 1-9: 0
1. LITTLE INTEREST OR PLEASURE IN DOING THINGS: NOT AT ALL
SUM OF ALL RESPONSES TO PHQ QUESTIONS 1-9: 0
2. FEELING DOWN, DEPRESSED OR HOPELESS: NOT AT ALL
2. FEELING DOWN, DEPRESSED OR HOPELESS: NOT AT ALL
SUM OF ALL RESPONSES TO PHQ9 QUESTIONS 1 & 2: 0
SUM OF ALL RESPONSES TO PHQ QUESTIONS 1-9: 0
1. LITTLE INTEREST OR PLEASURE IN DOING THINGS: NOT AT ALL
SUM OF ALL RESPONSES TO PHQ QUESTIONS 1-9: 0

## 2025-02-25 ENCOUNTER — OFFICE VISIT (OUTPATIENT)
Dept: FAMILY MEDICINE CLINIC | Age: 80
End: 2025-02-25
Payer: MEDICARE

## 2025-02-25 VITALS
DIASTOLIC BLOOD PRESSURE: 72 MMHG | HEART RATE: 54 BPM | HEIGHT: 70 IN | SYSTOLIC BLOOD PRESSURE: 126 MMHG | WEIGHT: 219.6 LBS | BODY MASS INDEX: 31.44 KG/M2 | OXYGEN SATURATION: 96 %

## 2025-02-25 DIAGNOSIS — E78.2 MIXED HYPERLIPIDEMIA: ICD-10-CM

## 2025-02-25 DIAGNOSIS — I25.10 CORONARY ARTERY DISEASE INVOLVING NATIVE CORONARY ARTERY OF NATIVE HEART WITHOUT ANGINA PECTORIS: ICD-10-CM

## 2025-02-25 DIAGNOSIS — Z85.46 PERSONAL HISTORY OF PROSTATE CANCER: ICD-10-CM

## 2025-02-25 DIAGNOSIS — I48.0 PAROXYSMAL ATRIAL FIBRILLATION (HCC): Primary | ICD-10-CM

## 2025-02-25 DIAGNOSIS — J44.9 CHRONIC OBSTRUCTIVE PULMONARY DISEASE, UNSPECIFIED COPD TYPE (HCC): ICD-10-CM

## 2025-02-25 DIAGNOSIS — M79.662 PAIN IN LEFT LOWER LEG: ICD-10-CM

## 2025-02-25 DIAGNOSIS — I50.22 SYSTOLIC CHF, CHRONIC (HCC): ICD-10-CM

## 2025-02-25 PROCEDURE — 1123F ACP DISCUSS/DSCN MKR DOCD: CPT | Performed by: FAMILY MEDICINE

## 2025-02-25 PROCEDURE — 1159F MED LIST DOCD IN RCRD: CPT | Performed by: FAMILY MEDICINE

## 2025-02-25 PROCEDURE — G2211 COMPLEX E/M VISIT ADD ON: HCPCS | Performed by: FAMILY MEDICINE

## 2025-02-25 PROCEDURE — 1036F TOBACCO NON-USER: CPT | Performed by: FAMILY MEDICINE

## 2025-02-25 PROCEDURE — G8417 CALC BMI ABV UP PARAM F/U: HCPCS | Performed by: FAMILY MEDICINE

## 2025-02-25 PROCEDURE — 3023F SPIROM DOC REV: CPT | Performed by: FAMILY MEDICINE

## 2025-02-25 PROCEDURE — G8427 DOCREV CUR MEDS BY ELIG CLIN: HCPCS | Performed by: FAMILY MEDICINE

## 2025-02-25 PROCEDURE — 99214 OFFICE O/P EST MOD 30 MIN: CPT | Performed by: FAMILY MEDICINE

## 2025-02-25 ASSESSMENT — ENCOUNTER SYMPTOMS
DIARRHEA: 0
COUGH: 1
SHORTNESS OF BREATH: 0
BLOOD IN STOOL: 0
CONSTIPATION: 0

## 2025-02-25 NOTE — PROGRESS NOTES
Chief Complaint   Patient presents with    Hyperlipidemia       HPI:  Lenin Carr is a 80 y.o. (: 1945) here today   for   Hyperlipidemia  This is a chronic problem. The current episode started more than 1 year ago. Pertinent negatives include no chest pain or shortness of breath. Current antihyperlipidemic treatment includes statins. There are no compliance problems.  Risk factors for coronary artery disease include dyslipidemia and male sex.     No new cardiac sxs.  Has appt w/ Dr Fowler next mo.  Has appt w/ EP in April.  No new chf sxs noted.  Last [facility] .  Hx of afib.  Does not feel.  Recent labs.  Still on amiodarone.  Still on eliquis.     Recent flu sxs.  Began last Friday.  Still some cough.  Seems to be improving.      No new lung sxs noted.      Hx of prostate cancer.  Last psa suppressed.      Has been having sensation of swelling and some pain to left lower leg.  No sig sxs during the day.      Patient's medications, allergies, past medical, surgical, social and family histories were reviewed and updated as appropriate.    ROS:  Review of Systems   Constitutional:  Negative for fever.   Respiratory:  Positive for cough. Negative for shortness of breath.    Cardiovascular:  Negative for chest pain and palpitations.   Gastrointestinal:  Negative for blood in stool, constipation and diarrhea.           LDL Direct (MG/DL)   Date Value   2018 61.8       Past Medical History:   Diagnosis Date    Asthma     childhood    Atrial fibrillation (HCC) Dec. 2022    CAD (coronary artery disease)     Celiac sprue     Chronic back pain     COPD (chronic obstructive pulmonary disease) (HCC)     DDD (degenerative disc disease), lumbar     Fatty liver     Hearing loss years    HTN (hypertension)     Hyperlipidemia     Paget's disease     Prostate cancer (HCC) 2009    Urinary incontinence 2009       Family History   Problem Relation Age of Onset    Other Mother         macular degeneration, DVT

## 2025-03-17 NOTE — PROGRESS NOTES
[facility]  Advanced CHF/Pulmonary Hypertension   Cardiac Evaluation      Lenin Carr  YOB: 1945    Date of Visit:  3/18/25      Chief Complaint   Patient presents with    Follow-up    Congestive Heart Failure        History of Present Illness:  Lenin Carr is a 80 y.o. male who presents from referral from Dr. Baeza for consultation and management of heart failure. He has a history of HLD, PAF s/p DCCV 4/2023, COPD, SOB, CAD.     In 2016 he had a LHC that showed minimal luminal irregularities.    In 1/2023 echo showed LVEF 50-55%.     In 11/2023 stress test was abnormal. On 11/20/2023 he underwent LHC that showed equivocal LM stenosis, and reduced LV EF of 45%.     OV with NPRB 12/22/23 an echo and PFT test was ordered. PFT test 1/2024 showed normal response. Echo 2/20/2024 EF 55%,     OV, 3/5/2024, He is here with his wife who is a retired nurse. 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 He says he tries to pace himself when working which helps his SOB. He follows with Dr. Haji for afib. He says he is planning to schedule for a cardioversion.     On 3/11/24 he had an external Dc cardioversion for persistent afib.     OV, 4/16/24, He recently recovered from COVID. He reports occasional shortness of breath and fatigue when over exerting himself. 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 He says he can tell he feels an improvement since his cardioversion. He started taking valsartan in the morning.     LOV, 7/23/24, He is able to exert himself doing farm work without limitations. He reports he is not able to tell if he goes in afib. He regularly check his blood pressure and weight at home.     Today, 3/18/2025, He reports he is doing well overall. He is able to walk his dog everyday without limitations. Patient is taking all cardiac medications as prescribed and tolerates them well.  Patient denies current edema, chest pain, shortness of breath, palpitations,

## 2025-03-18 ENCOUNTER — OFFICE VISIT (OUTPATIENT)
Dept: CARDIOLOGY CLINIC | Age: 80
End: 2025-03-18
Payer: MEDICARE

## 2025-03-18 ENCOUNTER — HOSPITAL ENCOUNTER (OUTPATIENT)
Dept: CARDIOLOGY | Age: 80
Discharge: HOME | End: 2025-03-20
Attending: INTERNAL MEDICINE
Payer: MEDICARE

## 2025-03-18 VITALS
DIASTOLIC BLOOD PRESSURE: 60 MMHG | SYSTOLIC BLOOD PRESSURE: 102 MMHG | OXYGEN SATURATION: 97 % | WEIGHT: 219 LBS | HEART RATE: 52 BPM | HEIGHT: 70 IN | BODY MASS INDEX: 31.35 KG/M2

## 2025-03-18 VITALS
DIASTOLIC BLOOD PRESSURE: 66 MMHG | SYSTOLIC BLOOD PRESSURE: 140 MMHG | HEIGHT: 70 IN | WEIGHT: 214 LBS | BODY MASS INDEX: 30.64 KG/M2

## 2025-03-18 DIAGNOSIS — I50.22 SYSTOLIC CHF, CHRONIC (HCC): ICD-10-CM

## 2025-03-18 DIAGNOSIS — I50.22 SYSTOLIC CHF, CHRONIC (HCC): Primary | ICD-10-CM

## 2025-03-18 DIAGNOSIS — I25.10 CORONARY ARTERY DISEASE INVOLVING NATIVE CORONARY ARTERY OF NATIVE HEART WITHOUT ANGINA PECTORIS: ICD-10-CM

## 2025-03-18 DIAGNOSIS — E78.2 MIXED HYPERLIPIDEMIA: ICD-10-CM

## 2025-03-18 DIAGNOSIS — I50.9 CONGESTIVE HEART FAILURE, UNSPECIFIED HF CHRONICITY, UNSPECIFIED HEART FAILURE TYPE (HCC): ICD-10-CM

## 2025-03-18 DIAGNOSIS — I48.0 PAROXYSMAL ATRIAL FIBRILLATION (HCC): ICD-10-CM

## 2025-03-18 DIAGNOSIS — R06.02 SOB (SHORTNESS OF BREATH) ON EXERTION: ICD-10-CM

## 2025-03-18 LAB
ECHO AO ASC DIAM: 4.1 CM
ECHO AO ASCENDING AORTA INDEX: 1.91 CM/M2
ECHO AO ROOT DIAM: 3.5 CM
ECHO AO ROOT INDEX: 1.63 CM/M2
ECHO AV CUSP MM: 2.3 CM
ECHO AV MEAN GRADIENT: 3 MMHG
ECHO AV MEAN GRADIENT: 3 MMHG
ECHO AV MEAN VELOCITY: 0.8 M/S
ECHO AV PEAK GRADIENT: 6 MMHG
ECHO AV PEAK VELOCITY: 1.3 M/S
ECHO AV VELOCITY RATIO: 0.77
ECHO AV VTI: 31.4 CM
ECHO BSA: 2.19 M2
ECHO EST RA PRESSURE: 8 MMHG
ECHO LA AREA 2C: 19 CM2
ECHO LA AREA 4C: 17.3 CM2
ECHO LA DIAMETER INDEX: 1.86 CM/M2
ECHO LA DIAMETER: 4 CM
ECHO LA MAJOR AXIS: 5.3 CM
ECHO LA MINOR AXIS: 4.9 CM
ECHO LA TO AORTIC ROOT RATIO: 1.14
ECHO LA VOL BP: 53 ML (ref 18–58)
ECHO LA VOL MOD A2C: 58 ML (ref 18–58)
ECHO LA VOL MOD A4C: 46 ML (ref 18–58)
ECHO LA VOL/BSA BIPLANE: 25 ML/M2 (ref 16–34)
ECHO LA VOLUME INDEX MOD A2C: 27 ML/M2 (ref 16–34)
ECHO LA VOLUME INDEX MOD A4C: 21 ML/M2 (ref 16–34)
ECHO LV E' LATERAL VELOCITY: 10.3 CM/S
ECHO LV E' SEPTAL VELOCITY: 8.16 CM/S
ECHO LV EF PHYSICIAN: 53 %
ECHO LV FRACTIONAL SHORTENING: 30 % (ref 28–44)
ECHO LV GLOBAL LONGITUDINAL STRAIN (GLS): -14.3 %
ECHO LV GLOBAL LONGITUDINAL STRAIN (GLS): -18.3 %
ECHO LV GLOBAL LONGITUDINAL STRAIN (GLS): -19.5 %
ECHO LV GLOBAL LONGITUDINAL STRAIN (GLS): -21.2 %
ECHO LV INTERNAL DIMENSION DIASTOLE INDEX: 2.47 CM/M2
ECHO LV INTERNAL DIMENSION DIASTOLIC: 5.3 CM (ref 4.2–5.9)
ECHO LV INTERNAL DIMENSION SYSTOLIC INDEX: 1.72 CM/M2
ECHO LV INTERNAL DIMENSION SYSTOLIC: 3.7 CM
ECHO LV ISOVOLUMETRIC RELAXATION TIME (IVRT): 82 MS
ECHO LV IVSD: 1.2 CM (ref 0.6–1)
ECHO LV MASS 2D: 271.6 G (ref 88–224)
ECHO LV MASS INDEX 2D: 126.3 G/M2 (ref 49–115)
ECHO LV POSTERIOR WALL DIASTOLIC: 1.3 CM (ref 0.6–1)
ECHO LV RELATIVE WALL THICKNESS RATIO: 0.49
ECHO LVOT AV VTI INDEX: 0.78
ECHO LVOT MEAN GRADIENT: 2 MMHG
ECHO LVOT PEAK GRADIENT: 4 MMHG
ECHO LVOT PEAK VELOCITY: 1 M/S
ECHO LVOT VTI: 24.6 CM
ECHO MV A VELOCITY: 0.68 M/S
ECHO MV E DECELERATION TIME (DT): 193 MS
ECHO MV E VELOCITY: 0.67 M/S
ECHO MV E/A RATIO: 0.99
ECHO MV E/E' LATERAL: 6.5
ECHO MV E/E' RATIO (AVERAGED): 7.36
ECHO MV E/E' SEPTAL: 8.21
ECHO RA AREA 4C: 15.9 CM2
ECHO RA END SYSTOLIC VOLUME APICAL 4 CHAMBER INDEX BSA: 15 ML/M2
ECHO RA VOLUME: 33 ML
ECHO RIGHT VENTRICULAR SYSTOLIC PRESSURE (RVSP): 29 MMHG
ECHO RV FREE WALL PEAK S': 11.3 CM/S
ECHO RV TAPSE: 1.7 CM (ref 1.7–?)
ECHO TV REGURGITANT MAX VELOCITY: 2.3 M/S
ECHO TV REGURGITANT PEAK GRADIENT: 21 MMHG

## 2025-03-18 PROCEDURE — 99215 OFFICE O/P EST HI 40 MIN: CPT | Performed by: INTERNAL MEDICINE

## 2025-03-18 PROCEDURE — 93306 TTE W/DOPPLER COMPLETE: CPT

## 2025-03-18 PROCEDURE — 1036F TOBACCO NON-USER: CPT | Performed by: INTERNAL MEDICINE

## 2025-03-18 PROCEDURE — 1123F ACP DISCUSS/DSCN MKR DOCD: CPT | Performed by: INTERNAL MEDICINE

## 2025-03-18 PROCEDURE — G8427 DOCREV CUR MEDS BY ELIG CLIN: HCPCS | Performed by: INTERNAL MEDICINE

## 2025-03-18 PROCEDURE — G8417 CALC BMI ABV UP PARAM F/U: HCPCS | Performed by: INTERNAL MEDICINE

## 2025-03-18 PROCEDURE — G2211 COMPLEX E/M VISIT ADD ON: HCPCS | Performed by: INTERNAL MEDICINE

## 2025-03-18 PROCEDURE — 1159F MED LIST DOCD IN RCRD: CPT | Performed by: INTERNAL MEDICINE

## 2025-03-18 PROCEDURE — 93306 TTE W/DOPPLER COMPLETE: CPT | Performed by: INTERNAL MEDICINE

## 2025-03-18 RX ORDER — ATORVASTATIN CALCIUM 40 MG/1
TABLET, FILM COATED ORAL
Qty: 90 TABLET | Refills: 3 | Status: SHIPPED | OUTPATIENT
Start: 2025-03-18

## 2025-03-18 RX ORDER — VALSARTAN 40 MG/1
40 TABLET ORAL NIGHTLY
Qty: 90 TABLET | Refills: 3 | Status: SHIPPED | OUTPATIENT
Start: 2025-03-18

## 2025-03-18 RX ORDER — SPIRONOLACTONE 25 MG/1
TABLET ORAL
Qty: 36 TABLET | Refills: 3 | Status: SHIPPED | OUTPATIENT
Start: 2025-03-18

## 2025-03-18 NOTE — PATIENT INSTRUCTIONS
Plan:  Increase atorvastatin (lipitor) 40 mg daily for cholesterol management  Fasting labs 6 months   Follow up in 6 months

## 2025-03-19 ENCOUNTER — RESULTS FOLLOW-UP (OUTPATIENT)
Dept: CARDIOLOGY | Age: 80
End: 2025-03-19

## 2025-03-19 DIAGNOSIS — J44.9 CHRONIC OBSTRUCTIVE PULMONARY DISEASE, UNSPECIFIED COPD TYPE (HCC): ICD-10-CM

## 2025-03-19 RX ORDER — FLUTICASONE FUROATE AND VILANTEROL TRIFENATATE 200; 25 UG/1; UG/1
1 POWDER RESPIRATORY (INHALATION) DAILY
Qty: 180 EACH | Refills: 3 | Status: SHIPPED | OUTPATIENT
Start: 2025-03-19

## 2025-04-08 DIAGNOSIS — I48.91 NEW ONSET A-FIB (HCC): ICD-10-CM

## 2025-04-09 RX ORDER — AMIODARONE HYDROCHLORIDE 200 MG/1
TABLET ORAL
Qty: 45 TABLET | Refills: 1 | Status: SHIPPED | OUTPATIENT
Start: 2025-04-09

## 2025-04-28 ENCOUNTER — TELEPHONE (OUTPATIENT)
Dept: CARDIOLOGY CLINIC | Age: 80
End: 2025-04-28

## 2025-04-28 ENCOUNTER — OFFICE VISIT (OUTPATIENT)
Dept: CARDIOLOGY CLINIC | Age: 80
End: 2025-04-28
Payer: MEDICARE

## 2025-04-28 ENCOUNTER — ANCILLARY PROCEDURE (OUTPATIENT)
Dept: CARDIOLOGY CLINIC | Age: 80
End: 2025-04-28

## 2025-04-28 VITALS
BODY MASS INDEX: 31.21 KG/M2 | SYSTOLIC BLOOD PRESSURE: 130 MMHG | WEIGHT: 218.03 LBS | HEIGHT: 70 IN | DIASTOLIC BLOOD PRESSURE: 60 MMHG | OXYGEN SATURATION: 97 % | HEART RATE: 46 BPM

## 2025-04-28 DIAGNOSIS — I44.30 ATRIOVENTRICULAR BLOCK: ICD-10-CM

## 2025-04-28 DIAGNOSIS — R94.31 ABNORMAL ELECTROCARDIOGRAM (ECG) (EKG): ICD-10-CM

## 2025-04-28 DIAGNOSIS — I48.0 PAROXYSMAL ATRIAL FIBRILLATION (HCC): Primary | ICD-10-CM

## 2025-04-28 DIAGNOSIS — I10 ESSENTIAL HYPERTENSION: ICD-10-CM

## 2025-04-28 LAB — ECHO BSA: 2.21 M2

## 2025-04-28 PROCEDURE — G8417 CALC BMI ABV UP PARAM F/U: HCPCS | Performed by: NURSE PRACTITIONER

## 2025-04-28 PROCEDURE — 1123F ACP DISCUSS/DSCN MKR DOCD: CPT | Performed by: NURSE PRACTITIONER

## 2025-04-28 PROCEDURE — 99215 OFFICE O/P EST HI 40 MIN: CPT | Performed by: NURSE PRACTITIONER

## 2025-04-28 PROCEDURE — 3075F SYST BP GE 130 - 139MM HG: CPT | Performed by: NURSE PRACTITIONER

## 2025-04-28 PROCEDURE — G2211 COMPLEX E/M VISIT ADD ON: HCPCS | Performed by: NURSE PRACTITIONER

## 2025-04-28 PROCEDURE — 1160F RVW MEDS BY RX/DR IN RCRD: CPT | Performed by: NURSE PRACTITIONER

## 2025-04-28 PROCEDURE — 3078F DIAST BP <80 MM HG: CPT | Performed by: NURSE PRACTITIONER

## 2025-04-28 PROCEDURE — 93000 ELECTROCARDIOGRAM COMPLETE: CPT | Performed by: NURSE PRACTITIONER

## 2025-04-28 PROCEDURE — G8427 DOCREV CUR MEDS BY ELIG CLIN: HCPCS | Performed by: NURSE PRACTITIONER

## 2025-04-28 PROCEDURE — 1159F MED LIST DOCD IN RCRD: CPT | Performed by: NURSE PRACTITIONER

## 2025-04-28 PROCEDURE — 1036F TOBACCO NON-USER: CPT | Performed by: NURSE PRACTITIONER

## 2025-04-28 NOTE — PROGRESS NOTES
[facility]   Electrophysiology Outpatient Note              Date:  April 28, 2025  Patient name: Lenin Carr  YOB: 1945    Primary Care physician: Clement Lebron MD    HISTORY OF PRESENT ILLNESS: The patient is a 80 y.o.  male with a history of AF, HTN, HLD, COPD, prostate cancer, and CAD.    In 3/2016, he had a LHC that showed nonobstructive CAD. LVEF was normal. In 12/2022, he presented for colonoscopy and was found to have an irregular heart beat. He was sent to the ED. EKG showed AF with a HR of 72. He wore an event monitor that showed persistent AF and low burden of PVC's. In 4/2023, he had a cardioversion. He reportedly went back into AF less than hours but [employer] mobile tracings were of poor quality. In 5/2023, EKG showed sinus rhythm. In 9/2023, patient went back into AF. He had a CT calcium score of 581 and was referred to interventional cardiology. In 11/2023, he had an abnormal stress test.  He had an LHC that showed equivocal left main disease.  Medical management was recommended and he was referred to the heart failure team. In 3/2024, amiodarone was started. On 3/11/2024, he had a cardioversion. In 3/2025, echo showed an EF of 50-55%.   Today he is being seen for AF. EKG shows SB with a HR of 46 bpm. He complains of fatigue, particularly with exertion. Denies dizziness, lightheadedness or loss of conscious.  Denies chest pain or shortness of breath.        Past Medical History:   has a past medical history of Asthma, Atrial fibrillation (HCC), CAD (coronary artery disease), Celiac sprue, Chronic back pain, COPD (chronic obstructive pulmonary disease) (HCC), DDD (degenerative disc disease), lumbar, Fatty liver, Hearing loss, HTN (hypertension), Hyperlipidemia, Paget's disease, Prostate cancer (HCC), and Urinary incontinence.    Past Surgical History:   has a past surgical history that includes Prostatectomy; Upper gastrointestinal endoscopy (11/19/2012);

## 2025-04-28 NOTE — TELEPHONE ENCOUNTER
Monitor placed by [facility]   Monitor company [employer]   Length of monitor 7 days   Monitor ordered by NPAM   Phone ID [id number]  First Patch ID [id number]  Activation successful prior to pt leaving office? Yes

## 2025-04-28 NOTE — PATIENT INSTRUCTIONS
Continue current medications     One week event monitor     Exercise stress test. Please call [phone] to schedule.     Follow up in 3 months or sooner if needed

## 2025-05-12 LAB — ECHO BSA: 2.21 M2

## 2025-05-19 ENCOUNTER — HOSPITAL ENCOUNTER (OUTPATIENT)
Age: 80
Discharge: HOME | End: 2025-05-21
Payer: MEDICARE

## 2025-05-19 DIAGNOSIS — R94.31 ABNORMAL ELECTROCARDIOGRAM (ECG) (EKG): ICD-10-CM

## 2025-05-19 DIAGNOSIS — I44.30 ATRIOVENTRICULAR BLOCK: ICD-10-CM

## 2025-05-19 DIAGNOSIS — I48.0 PAROXYSMAL ATRIAL FIBRILLATION (HCC): ICD-10-CM

## 2025-05-19 LAB
STRESS BASELINE DIAS BP: 75 MMHG
STRESS BASELINE HR: 57 BPM
STRESS BASELINE SYS BP: 144 MMHG
STRESS ESTIMATED WORKLOAD: 6.9 METS
STRESS EXERCISE DUR MIN: 5 MIN
STRESS PEAK DIAS BP: 56 MMHG
STRESS PEAK SYS BP: 210 MMHG
STRESS PERCENT HR ACHIEVED: 86 %
STRESS POST PEAK HR: 121 BPM
STRESS RATE PRESSURE PRODUCT: NORMAL BPM*MMHG
STRESS TARGET HR: 141 BPM

## 2025-05-19 PROCEDURE — 93018 CV STRESS TEST I&R ONLY: CPT | Performed by: INTERNAL MEDICINE

## 2025-05-19 PROCEDURE — 93017 CV STRESS TEST TRACING ONLY: CPT

## 2025-05-19 PROCEDURE — 93016 CV STRESS TEST SUPVJ ONLY: CPT | Performed by: INTERNAL MEDICINE

## 2025-05-19 NOTE — NURSING NOTE
Patient arrived to stress lab for plain gxt stress test. Patient was educated on procedure, all questions answered, and consent verified.

## 2025-06-02 DIAGNOSIS — I48.0 PAROXYSMAL ATRIAL FIBRILLATION (HCC): ICD-10-CM

## 2025-06-02 RX ORDER — APIXABAN 5 MG/1
5 TABLET, FILM COATED ORAL 2 TIMES DAILY
Qty: 180 TABLET | Refills: 3 | Status: SHIPPED | OUTPATIENT
Start: 2025-06-02 | End: 2025-08-31

## 2025-06-05 ENCOUNTER — RESULTS FOLLOW-UP (OUTPATIENT)
Dept: CARDIOLOGY | Age: 80
End: 2025-06-05

## 2025-06-05 NOTE — RESULT ENCOUNTER NOTE
Please let patient know his stress test was negative for ischemia and did not show chronotropic incompetence.

## 2025-06-17 DIAGNOSIS — K21.9 GASTROESOPHAGEAL REFLUX DISEASE WITHOUT ESOPHAGITIS: ICD-10-CM

## 2025-06-17 RX ORDER — OMEPRAZOLE 20 MG/1
20 CAPSULE, DELAYED RELEASE ORAL DAILY
Qty: 90 CAPSULE | Refills: 3 | Status: SHIPPED | OUTPATIENT
Start: 2025-06-17 | End: 2026-06-12

## 2025-08-19 ENCOUNTER — LAB (OUTPATIENT)
Dept: FAMILY MEDICINE CLINIC | Age: 80
End: 2025-08-19
Payer: MEDICARE

## 2025-08-19 DIAGNOSIS — E78.2 MIXED HYPERLIPIDEMIA: ICD-10-CM

## 2025-08-19 DIAGNOSIS — I48.0 PAROXYSMAL ATRIAL FIBRILLATION (HCC): ICD-10-CM

## 2025-08-19 DIAGNOSIS — Z85.46 PERSONAL HISTORY OF PROSTATE CANCER: ICD-10-CM

## 2025-08-19 LAB
ALBUMIN SERPL-MCNC: 4.4 G/DL (ref 3.4–5)
ALBUMIN/GLOB SERPL: 1.8 {RATIO} (ref 1.1–2.2)
ALP SERPL-CCNC: 105 U/L (ref 40–129)
ALT SERPL-CCNC: 22 U/L (ref 10–40)
ANION GAP SERPL CALCULATED.3IONS-SCNC: 12 MMOL/L (ref 3–16)
AST SERPL-CCNC: 28 U/L (ref 15–37)
BASOPHILS # BLD: 0 K/UL (ref 0–0.2)
BASOPHILS NFR BLD: 1.2 %
BILIRUB SERPL-MCNC: 0.5 MG/DL (ref 0–1)
BUN SERPL-MCNC: 18 MG/DL (ref 7–20)
CALCIUM SERPL-MCNC: 9.2 MG/DL (ref 8.3–10.6)
CHLORIDE SERPL-SCNC: 106 MMOL/L (ref 99–110)
CHOLEST SERPL-MCNC: 130 MG/DL (ref 0–199)
CO2 SERPL-SCNC: 24 MMOL/L (ref 21–32)
CREAT SERPL-MCNC: 1 MG/DL (ref 0.8–1.3)
DEPRECATED RDW RBC AUTO: 13 % (ref 12.4–15.4)
EOSINOPHIL # BLD: 0.1 K/UL (ref 0–0.6)
EOSINOPHIL NFR BLD: 2.5 %
GFR SERPLBLD CREATININE-BSD FMLA CKD-EPI: 76 ML/MIN/{1.73_M2}
GLUCOSE SERPL-MCNC: 89 MG/DL (ref 70–99)
HCT VFR BLD AUTO: 39.1 % (ref 40.5–52.5)
HDLC SERPL-MCNC: 52 MG/DL (ref 40–60)
HGB BLD-MCNC: 13.6 G/DL (ref 13.5–17.5)
LDLC SERPL CALC-MCNC: 65 MG/DL
LYMPHOCYTES # BLD: 1 K/UL (ref 1–5.1)
LYMPHOCYTES NFR BLD: 24.6 %
MCH RBC QN AUTO: 33.8 PG (ref 26–34)
MCHC RBC AUTO-ENTMCNC: 34.8 G/DL (ref 31–36)
MCV RBC AUTO: 97.2 FL (ref 80–100)
MONOCYTES # BLD: 0.3 K/UL (ref 0–1.3)
MONOCYTES NFR BLD: 7.9 %
NEUTROPHILS # BLD: 2.5 K/UL (ref 1.7–7.7)
NEUTROPHILS NFR BLD: 63.8 %
PLATELET # BLD AUTO: 192 K/UL (ref 135–450)
PMV BLD AUTO: 9.6 FL (ref 5–10.5)
POTASSIUM SERPL-SCNC: 4.4 MMOL/L (ref 3.5–5.1)
PROT SERPL-MCNC: 6.8 G/DL (ref 6.4–8.2)
PSA SERPL DL<=0.01 NG/ML-MCNC: <0.02 NG/ML (ref 0–4)
RBC # BLD AUTO: 4.02 M/UL (ref 4.2–5.9)
SODIUM SERPL-SCNC: 142 MMOL/L (ref 136–145)
T4 FREE SERPL-MCNC: 1.2 NG/DL (ref 0.9–1.8)
TRIGL SERPL-MCNC: 67 MG/DL (ref 0–150)
TSH SERPL DL<=0.005 MIU/L-ACNC: 3.85 UIU/ML (ref 0.27–4.2)
VLDLC SERPL CALC-MCNC: 13 MG/DL
WBC # BLD AUTO: 3.9 K/UL (ref 4–11)

## 2025-08-19 PROCEDURE — 36415 COLL VENOUS BLD VENIPUNCTURE: CPT | Performed by: FAMILY MEDICINE

## 2025-08-26 ENCOUNTER — OFFICE VISIT (OUTPATIENT)
Dept: FAMILY MEDICINE CLINIC | Age: 80
End: 2025-08-26
Payer: MEDICARE

## 2025-08-26 VITALS
OXYGEN SATURATION: 98 % | WEIGHT: 221.8 LBS | HEIGHT: 70 IN | HEART RATE: 52 BPM | DIASTOLIC BLOOD PRESSURE: 84 MMHG | SYSTOLIC BLOOD PRESSURE: 128 MMHG | BODY MASS INDEX: 31.75 KG/M2

## 2025-08-26 DIAGNOSIS — I25.10 CORONARY ARTERY DISEASE INVOLVING NATIVE CORONARY ARTERY OF NATIVE HEART WITHOUT ANGINA PECTORIS: ICD-10-CM

## 2025-08-26 DIAGNOSIS — I48.0 PAROXYSMAL ATRIAL FIBRILLATION (HCC): ICD-10-CM

## 2025-08-26 DIAGNOSIS — J44.9 CHRONIC OBSTRUCTIVE PULMONARY DISEASE, UNSPECIFIED COPD TYPE (HCC): Primary | ICD-10-CM

## 2025-08-26 DIAGNOSIS — I50.22 SYSTOLIC CHF, CHRONIC (HCC): ICD-10-CM

## 2025-08-26 DIAGNOSIS — E78.2 MIXED HYPERLIPIDEMIA: ICD-10-CM

## 2025-08-26 DIAGNOSIS — Z85.46 PERSONAL HISTORY OF PROSTATE CANCER: ICD-10-CM

## 2025-08-26 PROCEDURE — 1036F TOBACCO NON-USER: CPT | Performed by: FAMILY MEDICINE

## 2025-08-26 PROCEDURE — G8417 CALC BMI ABV UP PARAM F/U: HCPCS | Performed by: FAMILY MEDICINE

## 2025-08-26 PROCEDURE — 1123F ACP DISCUSS/DSCN MKR DOCD: CPT | Performed by: FAMILY MEDICINE

## 2025-08-26 PROCEDURE — G8427 DOCREV CUR MEDS BY ELIG CLIN: HCPCS | Performed by: FAMILY MEDICINE

## 2025-08-26 PROCEDURE — 99214 OFFICE O/P EST MOD 30 MIN: CPT | Performed by: FAMILY MEDICINE

## 2025-08-26 PROCEDURE — 3023F SPIROM DOC REV: CPT | Performed by: FAMILY MEDICINE

## 2025-08-26 PROCEDURE — 1159F MED LIST DOCD IN RCRD: CPT | Performed by: FAMILY MEDICINE

## 2025-08-26 PROCEDURE — G2211 COMPLEX E/M VISIT ADD ON: HCPCS | Performed by: FAMILY MEDICINE

## 2025-08-26 ASSESSMENT — ENCOUNTER SYMPTOMS: SHORTNESS OF BREATH: 1

## 2025-09-05 ENCOUNTER — OFFICE VISIT (OUTPATIENT)
Dept: CARDIOLOGY CLINIC | Age: 80
End: 2025-09-05
Payer: MEDICARE

## 2025-09-05 VITALS
BODY MASS INDEX: 31.21 KG/M2 | HEIGHT: 70 IN | OXYGEN SATURATION: 97 % | WEIGHT: 218 LBS | DIASTOLIC BLOOD PRESSURE: 68 MMHG | SYSTOLIC BLOOD PRESSURE: 124 MMHG | HEART RATE: 49 BPM

## 2025-09-05 DIAGNOSIS — I10 ESSENTIAL HYPERTENSION: ICD-10-CM

## 2025-09-05 DIAGNOSIS — I48.0 PAROXYSMAL ATRIAL FIBRILLATION (HCC): Primary | ICD-10-CM

## 2025-09-05 DIAGNOSIS — R00.1 SINUS BRADYCARDIA: ICD-10-CM

## 2025-09-05 DIAGNOSIS — Z51.81 ENCOUNTER FOR MONITORING AMIODARONE THERAPY: ICD-10-CM

## 2025-09-05 DIAGNOSIS — Z79.899 ENCOUNTER FOR MONITORING AMIODARONE THERAPY: ICD-10-CM

## 2025-09-05 PROCEDURE — 3078F DIAST BP <80 MM HG: CPT | Performed by: NURSE PRACTITIONER

## 2025-09-05 PROCEDURE — 1123F ACP DISCUSS/DSCN MKR DOCD: CPT | Performed by: NURSE PRACTITIONER

## 2025-09-05 PROCEDURE — 1036F TOBACCO NON-USER: CPT | Performed by: NURSE PRACTITIONER

## 2025-09-05 PROCEDURE — G8417 CALC BMI ABV UP PARAM F/U: HCPCS | Performed by: NURSE PRACTITIONER

## 2025-09-05 PROCEDURE — G8427 DOCREV CUR MEDS BY ELIG CLIN: HCPCS | Performed by: NURSE PRACTITIONER

## 2025-09-05 PROCEDURE — 1159F MED LIST DOCD IN RCRD: CPT | Performed by: NURSE PRACTITIONER

## 2025-09-05 PROCEDURE — 93000 ELECTROCARDIOGRAM COMPLETE: CPT | Performed by: NURSE PRACTITIONER

## 2025-09-05 PROCEDURE — 3074F SYST BP LT 130 MM HG: CPT | Performed by: NURSE PRACTITIONER

## 2025-09-05 PROCEDURE — G2211 COMPLEX E/M VISIT ADD ON: HCPCS | Performed by: NURSE PRACTITIONER

## 2025-09-05 PROCEDURE — 99214 OFFICE O/P EST MOD 30 MIN: CPT | Performed by: NURSE PRACTITIONER

## 2025-09-05 RX ORDER — AMIODARONE HYDROCHLORIDE 200 MG/1
100 TABLET ORAL DAILY
Qty: 45 TABLET | Refills: 3 | Status: SHIPPED | OUTPATIENT
Start: 2025-09-05